# Patient Record
Sex: MALE | Race: WHITE | NOT HISPANIC OR LATINO | Employment: OTHER | URBAN - METROPOLITAN AREA
[De-identification: names, ages, dates, MRNs, and addresses within clinical notes are randomized per-mention and may not be internally consistent; named-entity substitution may affect disease eponyms.]

---

## 2018-02-28 ENCOUNTER — RESOLUTE PROFESSIONAL BILLING HOSPITAL PROF FEE (OUTPATIENT)
Dept: HOSPITALIST | Facility: MEDICAL CENTER | Age: 73
End: 2018-02-28
Payer: MEDICARE

## 2018-02-28 ENCOUNTER — APPOINTMENT (OUTPATIENT)
Dept: RADIOLOGY | Facility: MEDICAL CENTER | Age: 73
DRG: 391 | End: 2018-02-28
Attending: EMERGENCY MEDICINE
Payer: MEDICARE

## 2018-02-28 ENCOUNTER — HOSPITAL ENCOUNTER (INPATIENT)
Facility: MEDICAL CENTER | Age: 73
LOS: 2 days | DRG: 391 | End: 2018-03-02
Attending: EMERGENCY MEDICINE | Admitting: INTERNAL MEDICINE
Payer: MEDICARE

## 2018-02-28 DIAGNOSIS — K57.32 DIVERTICULITIS OF LARGE INTESTINE WITHOUT PERFORATION OR ABSCESS WITHOUT BLEEDING: ICD-10-CM

## 2018-02-28 DIAGNOSIS — K57.32 SIGMOID DIVERTICULITIS: ICD-10-CM

## 2018-02-28 PROBLEM — R59.1 LYMPHADENOPATHY: Status: ACTIVE | Noted: 2018-02-28

## 2018-02-28 PROBLEM — E78.5 HLD (HYPERLIPIDEMIA): Status: ACTIVE | Noted: 2018-02-28

## 2018-02-28 PROBLEM — F32.A DEPRESSION: Status: ACTIVE | Noted: 2018-02-28

## 2018-02-28 PROBLEM — K57.92 DIVERTICULITIS: Status: ACTIVE | Noted: 2018-02-28

## 2018-02-28 LAB
ALBUMIN SERPL BCP-MCNC: 4.3 G/DL (ref 3.2–4.9)
ALBUMIN/GLOB SERPL: 1.2 G/DL
ALP SERPL-CCNC: 69 U/L (ref 30–99)
ALT SERPL-CCNC: 30 U/L (ref 2–50)
ANION GAP SERPL CALC-SCNC: 8 MMOL/L (ref 0–11.9)
APPEARANCE UR: CLEAR
AST SERPL-CCNC: 29 U/L (ref 12–45)
BACTERIA #/AREA URNS HPF: ABNORMAL /HPF
BASOPHILS # BLD AUTO: 0.7 % (ref 0–1.8)
BASOPHILS # BLD: 0.05 K/UL (ref 0–0.12)
BILIRUB SERPL-MCNC: 0.9 MG/DL (ref 0.1–1.5)
BILIRUB UR QL CFM: NORMAL
BILIRUB UR QL STRIP.AUTO: ABNORMAL
BUN SERPL-MCNC: 18 MG/DL (ref 8–22)
CALCIUM SERPL-MCNC: 9.4 MG/DL (ref 8.4–10.2)
CHLORIDE SERPL-SCNC: 106 MMOL/L (ref 96–112)
CO2 SERPL-SCNC: 22 MMOL/L (ref 20–33)
COLOR UR: YELLOW
CREAT SERPL-MCNC: 1.27 MG/DL (ref 0.5–1.4)
EOSINOPHIL # BLD AUTO: 0.18 K/UL (ref 0–0.51)
EOSINOPHIL NFR BLD: 2.6 % (ref 0–6.9)
ERYTHROCYTE [DISTWIDTH] IN BLOOD BY AUTOMATED COUNT: 45.2 FL (ref 35.9–50)
GLOBULIN SER CALC-MCNC: 3.5 G/DL (ref 1.9–3.5)
GLUCOSE SERPL-MCNC: 96 MG/DL (ref 65–99)
GLUCOSE UR STRIP.AUTO-MCNC: NEGATIVE MG/DL
HCT VFR BLD AUTO: 45 % (ref 42–52)
HGB BLD-MCNC: 15 G/DL (ref 14–18)
IMM GRANULOCYTES # BLD AUTO: 0.02 K/UL (ref 0–0.11)
IMM GRANULOCYTES NFR BLD AUTO: 0.3 % (ref 0–0.9)
KETONES UR STRIP.AUTO-MCNC: NEGATIVE MG/DL
LEUKOCYTE ESTERASE UR QL STRIP.AUTO: NEGATIVE
LIPASE SERPL-CCNC: 11 U/L (ref 7–58)
LYMPHOCYTES # BLD AUTO: 1.07 K/UL (ref 1–4.8)
LYMPHOCYTES NFR BLD: 15.2 % (ref 22–41)
MCH RBC QN AUTO: 28.6 PG (ref 27–33)
MCHC RBC AUTO-ENTMCNC: 33.3 G/DL (ref 33.7–35.3)
MCV RBC AUTO: 85.9 FL (ref 81.4–97.8)
MICRO URNS: ABNORMAL
MONOCYTES # BLD AUTO: 0.91 K/UL (ref 0–0.85)
MONOCYTES NFR BLD AUTO: 12.9 % (ref 0–13.4)
NEUTROPHILS # BLD AUTO: 4.82 K/UL (ref 1.82–7.42)
NEUTROPHILS NFR BLD: 68.3 % (ref 44–72)
NITRITE UR QL STRIP.AUTO: POSITIVE
NRBC # BLD AUTO: 0 K/UL
NRBC BLD-RTO: 0 /100 WBC
PH UR STRIP.AUTO: 5 [PH]
PLATELET # BLD AUTO: 207 K/UL (ref 164–446)
PMV BLD AUTO: 8.9 FL (ref 9–12.9)
POTASSIUM SERPL-SCNC: 3.7 MMOL/L (ref 3.6–5.5)
PROT SERPL-MCNC: 7.8 G/DL (ref 6–8.2)
PROT UR QL STRIP: NEGATIVE MG/DL
RBC # BLD AUTO: 5.24 M/UL (ref 4.7–6.1)
RBC # URNS HPF: ABNORMAL /HPF
RBC UR QL AUTO: NEGATIVE
SODIUM SERPL-SCNC: 136 MMOL/L (ref 135–145)
SP GR UR REFRACTOMETRY: 1.03
WBC # BLD AUTO: 7.1 K/UL (ref 4.8–10.8)
WBC #/AREA URNS HPF: ABNORMAL /HPF

## 2018-02-28 PROCEDURE — 700102 HCHG RX REV CODE 250 W/ 637 OVERRIDE(OP): Performed by: INTERNAL MEDICINE

## 2018-02-28 PROCEDURE — 770006 HCHG ROOM/CARE - MED/SURG/GYN SEMI*

## 2018-02-28 PROCEDURE — 81001 URINALYSIS AUTO W/SCOPE: CPT

## 2018-02-28 PROCEDURE — 94760 N-INVAS EAR/PLS OXIMETRY 1: CPT

## 2018-02-28 PROCEDURE — 99221 1ST HOSP IP/OBS SF/LOW 40: CPT | Mod: AI | Performed by: INTERNAL MEDICINE

## 2018-02-28 PROCEDURE — 85025 COMPLETE CBC W/AUTO DIFF WBC: CPT

## 2018-02-28 PROCEDURE — 90670 PCV13 VACCINE IM: CPT | Performed by: INTERNAL MEDICINE

## 2018-02-28 PROCEDURE — 99285 EMERGENCY DEPT VISIT HI MDM: CPT

## 2018-02-28 PROCEDURE — 96365 THER/PROPH/DIAG IV INF INIT: CPT

## 2018-02-28 PROCEDURE — 700101 HCHG RX REV CODE 250: Performed by: INTERNAL MEDICINE

## 2018-02-28 PROCEDURE — 700105 HCHG RX REV CODE 258: Performed by: INTERNAL MEDICINE

## 2018-02-28 PROCEDURE — 3E0234Z INTRODUCTION OF SERUM, TOXOID AND VACCINE INTO MUSCLE, PERCUTANEOUS APPROACH: ICD-10-PCS | Performed by: INTERNAL MEDICINE

## 2018-02-28 PROCEDURE — 90471 IMMUNIZATION ADMIN: CPT

## 2018-02-28 PROCEDURE — 96375 TX/PRO/DX INJ NEW DRUG ADDON: CPT

## 2018-02-28 PROCEDURE — 96372 THER/PROPH/DIAG INJ SC/IM: CPT

## 2018-02-28 PROCEDURE — 83690 ASSAY OF LIPASE: CPT

## 2018-02-28 PROCEDURE — A9270 NON-COVERED ITEM OR SERVICE: HCPCS | Performed by: INTERNAL MEDICINE

## 2018-02-28 PROCEDURE — 700111 HCHG RX REV CODE 636 W/ 250 OVERRIDE (IP): Performed by: INTERNAL MEDICINE

## 2018-02-28 PROCEDURE — 80053 COMPREHEN METABOLIC PANEL: CPT

## 2018-02-28 PROCEDURE — 74176 CT ABD & PELVIS W/O CONTRAST: CPT

## 2018-02-28 PROCEDURE — 700111 HCHG RX REV CODE 636 W/ 250 OVERRIDE (IP): Performed by: EMERGENCY MEDICINE

## 2018-02-28 RX ORDER — FENOFIBRATE 134 MG/1
134 CAPSULE ORAL DAILY
Status: DISCONTINUED | OUTPATIENT
Start: 2018-03-01 | End: 2018-03-02 | Stop reason: HOSPADM

## 2018-02-28 RX ORDER — METOPROLOL SUCCINATE 25 MG/1
50 TABLET, EXTENDED RELEASE ORAL EVERY EVENING
Status: DISCONTINUED | OUTPATIENT
Start: 2018-02-28 | End: 2018-03-02 | Stop reason: HOSPADM

## 2018-02-28 RX ORDER — FENOFIBRATE 160 MG/1
160 TABLET ORAL DAILY
Status: DISCONTINUED | OUTPATIENT
Start: 2018-02-28 | End: 2018-02-28

## 2018-02-28 RX ORDER — PAROXETINE HYDROCHLORIDE 20 MG/1
20 TABLET, FILM COATED ORAL DAILY
Status: DISCONTINUED | OUTPATIENT
Start: 2018-03-01 | End: 2018-03-02 | Stop reason: HOSPADM

## 2018-02-28 RX ORDER — CIPROFLOXACIN 2 MG/ML
400 INJECTION, SOLUTION INTRAVENOUS ONCE
Status: COMPLETED | OUTPATIENT
Start: 2018-02-28 | End: 2018-02-28

## 2018-02-28 RX ORDER — ASPIRIN 325 MG
162.5 TABLET ORAL DAILY
Status: DISCONTINUED | OUTPATIENT
Start: 2018-02-28 | End: 2018-03-02 | Stop reason: HOSPADM

## 2018-02-28 RX ORDER — SODIUM CHLORIDE 9 MG/ML
INJECTION, SOLUTION INTRAVENOUS CONTINUOUS
Status: DISCONTINUED | OUTPATIENT
Start: 2018-02-28 | End: 2018-03-02 | Stop reason: HOSPADM

## 2018-02-28 RX ORDER — MORPHINE SULFATE 4 MG/ML
2 INJECTION, SOLUTION INTRAMUSCULAR; INTRAVENOUS
Status: DISCONTINUED | OUTPATIENT
Start: 2018-02-28 | End: 2018-03-02 | Stop reason: HOSPADM

## 2018-02-28 RX ORDER — OXYCODONE HYDROCHLORIDE 5 MG/1
5 TABLET ORAL
Status: DISCONTINUED | OUTPATIENT
Start: 2018-02-28 | End: 2018-03-02 | Stop reason: HOSPADM

## 2018-02-28 RX ORDER — KETOROLAC TROMETHAMINE 30 MG/ML
30 INJECTION, SOLUTION INTRAMUSCULAR; INTRAVENOUS ONCE
Status: COMPLETED | OUTPATIENT
Start: 2018-02-28 | End: 2018-02-28

## 2018-02-28 RX ORDER — ASPIRIN 325 MG
162.5 TABLET ORAL EVERY EVENING
COMMUNITY
End: 2020-01-15

## 2018-02-28 RX ORDER — SIMVASTATIN 10 MG
10 TABLET ORAL DAILY
Status: DISCONTINUED | OUTPATIENT
Start: 2018-03-01 | End: 2018-03-02 | Stop reason: HOSPADM

## 2018-02-28 RX ORDER — KETOROLAC TROMETHAMINE 30 MG/ML
15 INJECTION, SOLUTION INTRAMUSCULAR; INTRAVENOUS ONCE
Status: DISCONTINUED | OUTPATIENT
Start: 2018-02-28 | End: 2018-02-28

## 2018-02-28 RX ORDER — OXYCODONE HYDROCHLORIDE 5 MG/1
2.5 TABLET ORAL
Status: DISCONTINUED | OUTPATIENT
Start: 2018-02-28 | End: 2018-03-02 | Stop reason: HOSPADM

## 2018-02-28 RX ORDER — METOPROLOL SUCCINATE 25 MG/1
50 TABLET, EXTENDED RELEASE ORAL EVERY EVENING
Status: DISCONTINUED | OUTPATIENT
Start: 2018-03-01 | End: 2018-02-28

## 2018-02-28 RX ADMIN — KETOROLAC TROMETHAMINE 30 MG: 30 INJECTION, SOLUTION INTRAMUSCULAR at 16:46

## 2018-02-28 RX ADMIN — SODIUM CHLORIDE: 9 INJECTION, SOLUTION INTRAVENOUS at 22:13

## 2018-02-28 RX ADMIN — CEFTRIAXONE 2 G: 2 INJECTION, POWDER, FOR SOLUTION INTRAMUSCULAR; INTRAVENOUS at 18:56

## 2018-02-28 RX ADMIN — PNEUMOCOCCAL 13-VALENT CONJUGATE VACCINE 0.5 ML: 2.2; 2.2; 2.2; 2.2; 2.2; 4.4; 2.2; 2.2; 2.2; 2.2; 2.2; 2.2; 2.2 INJECTION, SUSPENSION INTRAMUSCULAR at 20:42

## 2018-02-28 RX ADMIN — OXYCODONE HYDROCHLORIDE 5 MG: 5 TABLET ORAL at 18:57

## 2018-02-28 RX ADMIN — METRONIDAZOLE 500 MG: 500 INJECTION, SOLUTION INTRAVENOUS at 19:00

## 2018-02-28 RX ADMIN — ENOXAPARIN SODIUM 40 MG: 100 INJECTION SUBCUTANEOUS at 18:57

## 2018-02-28 RX ADMIN — FENTANYL CITRATE 50 MCG: 50 INJECTION INTRAMUSCULAR; INTRAVENOUS at 17:27

## 2018-02-28 RX ADMIN — ASPIRIN 325 MG ORAL TABLET 162.5 MG: 325 PILL ORAL at 18:59

## 2018-02-28 RX ADMIN — CIPROFLOXACIN 400 MG: 2 INJECTION, SOLUTION INTRAVENOUS at 17:31

## 2018-02-28 ASSESSMENT — LIFESTYLE VARIABLES
CONSUMPTION TOTAL: POSITIVE
HAVE PEOPLE ANNOYED YOU BY CRITICIZING YOUR DRINKING: NO
TOTAL SCORE: 0
HOW MANY TIMES IN THE PAST YEAR HAVE YOU HAD 5 OR MORE DRINKS IN A DAY: 3
AVERAGE NUMBER OF DAYS PER WEEK YOU HAVE A DRINK CONTAINING ALCOHOL: 0
EVER FELT BAD OR GUILTY ABOUT YOUR DRINKING: NO
HAVE YOU EVER FELT YOU SHOULD CUT DOWN ON YOUR DRINKING: NO
TOTAL SCORE: 0
TOTAL SCORE: 0
EVER HAD A DRINK FIRST THING IN THE MORNING TO STEADY YOUR NERVES TO GET RID OF A HANGOVER: NO
ALCOHOL_USE: YES
ON A TYPICAL DAY WHEN YOU DRINK ALCOHOL HOW MANY DRINKS DO YOU HAVE: 3
EVER_SMOKED: YES

## 2018-02-28 ASSESSMENT — ENCOUNTER SYMPTOMS
NAUSEA: 0
BACK PAIN: 1
FOCAL WEAKNESS: 0
DEPRESSION: 0
BLOOD IN STOOL: 0
SHORTNESS OF BREATH: 0
VOMITING: 0
DIZZINESS: 0
FEVER: 0
FLANK PAIN: 1
PALPITATIONS: 0
HEARTBURN: 0
DIARRHEA: 0
HALLUCINATIONS: 0
HEADACHES: 0
SORE THROAT: 0
WEAKNESS: 0
ABDOMINAL PAIN: 0
CHILLS: 0
COUGH: 0
MYALGIAS: 0

## 2018-02-28 ASSESSMENT — PATIENT HEALTH QUESTIONNAIRE - PHQ9
SUM OF ALL RESPONSES TO PHQ QUESTIONS 1-9: 0
1. LITTLE INTEREST OR PLEASURE IN DOING THINGS: NOT AT ALL
SUM OF ALL RESPONSES TO PHQ9 QUESTIONS 1 AND 2: 0
2. FEELING DOWN, DEPRESSED, IRRITABLE, OR HOPELESS: NOT AT ALL

## 2018-02-28 ASSESSMENT — PAIN SCALES - GENERAL
PAINLEVEL_OUTOF10: 10
PAINLEVEL_OUTOF10: 8

## 2018-02-28 NOTE — ED NOTES
Assumed care of pt upon being roomed. Vs as charted, states left sided low back pain 8/10. In no apparent distress, vs as charted, call light in reach. Labs noted from triage-await erp

## 2018-03-01 PROBLEM — K76.0 FATTY LIVER: Status: ACTIVE | Noted: 2018-03-01

## 2018-03-01 PROBLEM — J18.9 PNEUMONIA: Status: ACTIVE | Noted: 2018-03-01

## 2018-03-01 LAB
ALBUMIN SERPL BCP-MCNC: 3.3 G/DL (ref 3.2–4.9)
ALBUMIN/GLOB SERPL: 1.2 G/DL
ALP SERPL-CCNC: 55 U/L (ref 30–99)
ALT SERPL-CCNC: 26 U/L (ref 2–50)
ANION GAP SERPL CALC-SCNC: 7 MMOL/L (ref 0–11.9)
AST SERPL-CCNC: 24 U/L (ref 12–45)
BASOPHILS # BLD AUTO: 0.8 % (ref 0–1.8)
BASOPHILS # BLD: 0.05 K/UL (ref 0–0.12)
BILIRUB SERPL-MCNC: 0.9 MG/DL (ref 0.1–1.5)
BUN SERPL-MCNC: 19 MG/DL (ref 8–22)
CALCIUM SERPL-MCNC: 8.6 MG/DL (ref 8.4–10.2)
CHLORIDE SERPL-SCNC: 108 MMOL/L (ref 96–112)
CHOLEST SERPL-MCNC: 153 MG/DL (ref 100–199)
CO2 SERPL-SCNC: 24 MMOL/L (ref 20–33)
CREAT SERPL-MCNC: 1.45 MG/DL (ref 0.5–1.4)
EOSINOPHIL # BLD AUTO: 0.24 K/UL (ref 0–0.51)
EOSINOPHIL NFR BLD: 4 % (ref 0–6.9)
ERYTHROCYTE [DISTWIDTH] IN BLOOD BY AUTOMATED COUNT: 46.1 FL (ref 35.9–50)
GLOBULIN SER CALC-MCNC: 2.7 G/DL (ref 1.9–3.5)
GLUCOSE SERPL-MCNC: 102 MG/DL (ref 65–99)
HAV IGM SERPL QL IA: NEGATIVE
HBV CORE IGM SER QL: NEGATIVE
HBV SURFACE AG SER QL: NEGATIVE
HCT VFR BLD AUTO: 39.2 % (ref 42–52)
HCV AB SER QL: NEGATIVE
HDLC SERPL-MCNC: 29 MG/DL
HGB BLD-MCNC: 12.8 G/DL (ref 14–18)
IMM GRANULOCYTES # BLD AUTO: 0.04 K/UL (ref 0–0.11)
IMM GRANULOCYTES NFR BLD AUTO: 0.7 % (ref 0–0.9)
LDLC SERPL CALC-MCNC: 82 MG/DL
LYMPHOCYTES # BLD AUTO: 1 K/UL (ref 1–4.8)
LYMPHOCYTES NFR BLD: 16.8 % (ref 22–41)
MCH RBC QN AUTO: 28.3 PG (ref 27–33)
MCHC RBC AUTO-ENTMCNC: 32.7 G/DL (ref 33.7–35.3)
MCV RBC AUTO: 86.5 FL (ref 81.4–97.8)
MONOCYTES # BLD AUTO: 0.91 K/UL (ref 0–0.85)
MONOCYTES NFR BLD AUTO: 15.3 % (ref 0–13.4)
NEUTROPHILS # BLD AUTO: 3.71 K/UL (ref 1.82–7.42)
NEUTROPHILS NFR BLD: 62.4 % (ref 44–72)
NRBC # BLD AUTO: 0 K/UL
NRBC BLD-RTO: 0 /100 WBC
PLATELET # BLD AUTO: 162 K/UL (ref 164–446)
PMV BLD AUTO: 9 FL (ref 9–12.9)
POTASSIUM SERPL-SCNC: 3.9 MMOL/L (ref 3.6–5.5)
PROT SERPL-MCNC: 6 G/DL (ref 6–8.2)
RBC # BLD AUTO: 4.53 M/UL (ref 4.7–6.1)
SODIUM SERPL-SCNC: 139 MMOL/L (ref 135–145)
TRIGL SERPL-MCNC: 209 MG/DL (ref 0–149)
WBC # BLD AUTO: 6 K/UL (ref 4.8–10.8)

## 2018-03-01 PROCEDURE — 700102 HCHG RX REV CODE 250 W/ 637 OVERRIDE(OP): Performed by: STUDENT IN AN ORGANIZED HEALTH CARE EDUCATION/TRAINING PROGRAM

## 2018-03-01 PROCEDURE — 80061 LIPID PANEL: CPT

## 2018-03-01 PROCEDURE — A9270 NON-COVERED ITEM OR SERVICE: HCPCS | Performed by: STUDENT IN AN ORGANIZED HEALTH CARE EDUCATION/TRAINING PROGRAM

## 2018-03-01 PROCEDURE — A9270 NON-COVERED ITEM OR SERVICE: HCPCS | Performed by: INTERNAL MEDICINE

## 2018-03-01 PROCEDURE — 94640 AIRWAY INHALATION TREATMENT: CPT

## 2018-03-01 PROCEDURE — 94760 N-INVAS EAR/PLS OXIMETRY 1: CPT

## 2018-03-01 PROCEDURE — 80053 COMPREHEN METABOLIC PANEL: CPT

## 2018-03-01 PROCEDURE — 770006 HCHG ROOM/CARE - MED/SURG/GYN SEMI*

## 2018-03-01 PROCEDURE — 99232 SBSQ HOSP IP/OBS MODERATE 35: CPT | Performed by: HOSPITALIST

## 2018-03-01 PROCEDURE — 700111 HCHG RX REV CODE 636 W/ 250 OVERRIDE (IP): Performed by: INTERNAL MEDICINE

## 2018-03-01 PROCEDURE — 700101 HCHG RX REV CODE 250: Performed by: INTERNAL MEDICINE

## 2018-03-01 PROCEDURE — 36415 COLL VENOUS BLD VENIPUNCTURE: CPT

## 2018-03-01 PROCEDURE — 700105 HCHG RX REV CODE 258: Performed by: INTERNAL MEDICINE

## 2018-03-01 PROCEDURE — 85025 COMPLETE CBC W/AUTO DIFF WBC: CPT

## 2018-03-01 PROCEDURE — 700101 HCHG RX REV CODE 250: Performed by: HOSPITALIST

## 2018-03-01 PROCEDURE — 700102 HCHG RX REV CODE 250 W/ 637 OVERRIDE(OP): Performed by: INTERNAL MEDICINE

## 2018-03-01 PROCEDURE — 80074 ACUTE HEPATITIS PANEL: CPT

## 2018-03-01 RX ORDER — IPRATROPIUM BROMIDE AND ALBUTEROL SULFATE 2.5; .5 MG/3ML; MG/3ML
3 SOLUTION RESPIRATORY (INHALATION)
Status: DISCONTINUED | OUTPATIENT
Start: 2018-03-01 | End: 2018-03-02 | Stop reason: HOSPADM

## 2018-03-01 RX ORDER — ZOLPIDEM TARTRATE 5 MG/1
5 TABLET ORAL NIGHTLY PRN
Status: DISCONTINUED | OUTPATIENT
Start: 2018-03-01 | End: 2018-03-02 | Stop reason: HOSPADM

## 2018-03-01 RX ORDER — IPRATROPIUM BROMIDE AND ALBUTEROL SULFATE 2.5; .5 MG/3ML; MG/3ML
3 SOLUTION RESPIRATORY (INHALATION)
Status: DISCONTINUED | OUTPATIENT
Start: 2018-03-01 | End: 2018-03-01 | Stop reason: ALTCHOICE

## 2018-03-01 RX ORDER — METRONIDAZOLE 500 MG/1
500 TABLET ORAL EVERY 8 HOURS
Status: DISCONTINUED | OUTPATIENT
Start: 2018-03-01 | End: 2018-03-02 | Stop reason: HOSPADM

## 2018-03-01 RX ADMIN — CEFTRIAXONE 2 G: 2 INJECTION, POWDER, FOR SOLUTION INTRAMUSCULAR; INTRAVENOUS at 08:19

## 2018-03-01 RX ADMIN — PAROXETINE HYDROCHLORIDE 20 MG: 20 TABLET, FILM COATED ORAL at 08:19

## 2018-03-01 RX ADMIN — IPRATROPIUM BROMIDE AND ALBUTEROL SULFATE 3 ML: .5; 3 SOLUTION RESPIRATORY (INHALATION) at 21:24

## 2018-03-01 RX ADMIN — ZOLPIDEM TARTRATE 5 MG: 5 TABLET ORAL at 00:25

## 2018-03-01 RX ADMIN — METRONIDAZOLE 500 MG: 500 INJECTION, SOLUTION INTRAVENOUS at 05:35

## 2018-03-01 RX ADMIN — METOPROLOL SUCCINATE 50 MG: 25 TABLET, EXTENDED RELEASE ORAL at 20:31

## 2018-03-01 RX ADMIN — SIMVASTATIN 10 MG: 10 TABLET, FILM COATED ORAL at 08:19

## 2018-03-01 RX ADMIN — FENOFIBRATE 134 MG: 134 CAPSULE ORAL at 08:19

## 2018-03-01 RX ADMIN — METRONIDAZOLE 500 MG: 500 TABLET ORAL at 14:37

## 2018-03-01 RX ADMIN — OXYCODONE HYDROCHLORIDE 5 MG: 5 TABLET ORAL at 11:47

## 2018-03-01 RX ADMIN — OXYCODONE HYDROCHLORIDE 5 MG: 5 TABLET ORAL at 20:31

## 2018-03-01 RX ADMIN — OXYCODONE HYDROCHLORIDE 5 MG: 5 TABLET ORAL at 00:22

## 2018-03-01 RX ADMIN — METRONIDAZOLE 500 MG: 500 TABLET ORAL at 22:20

## 2018-03-01 RX ADMIN — ENOXAPARIN SODIUM 40 MG: 100 INJECTION SUBCUTANEOUS at 08:19

## 2018-03-01 RX ADMIN — OXYCODONE HYDROCHLORIDE 5 MG: 5 TABLET ORAL at 05:34

## 2018-03-01 RX ADMIN — IPRATROPIUM BROMIDE AND ALBUTEROL SULFATE 3 ML: .5; 3 SOLUTION RESPIRATORY (INHALATION) at 17:32

## 2018-03-01 RX ADMIN — SODIUM CHLORIDE: 9 INJECTION, SOLUTION INTRAVENOUS at 05:38

## 2018-03-01 RX ADMIN — ASPIRIN 325 MG ORAL TABLET 162.5 MG: 325 PILL ORAL at 08:19

## 2018-03-01 ASSESSMENT — PAIN SCALES - GENERAL
PAINLEVEL_OUTOF10: 7
PAINLEVEL_OUTOF10: 2
PAINLEVEL_OUTOF10: 6
PAINLEVEL_OUTOF10: 9
PAINLEVEL_OUTOF10: 8
PAINLEVEL_OUTOF10: 3
PAINLEVEL_OUTOF10: 4

## 2018-03-01 ASSESSMENT — ENCOUNTER SYMPTOMS
PALPITATIONS: 0
WHEEZING: 1
CHILLS: 0
COUGH: 1
EYE DISCHARGE: 0
TREMORS: 0
EYE REDNESS: 0
VOMITING: 0
STRIDOR: 0
MYALGIAS: 1
SENSORY CHANGE: 0
DIARRHEA: 0
FOCAL WEAKNESS: 0
FLANK PAIN: 1
BACK PAIN: 1
FEVER: 0
DIAPHORESIS: 0
HALLUCINATIONS: 0
SHORTNESS OF BREATH: 0
WEAKNESS: 0
SPEECH CHANGE: 0
ABDOMINAL PAIN: 0

## 2018-03-01 ASSESSMENT — LIFESTYLE VARIABLES
SUBSTANCE_ABUSE: 1
EVER_SMOKED: YES

## 2018-03-01 NOTE — PROGRESS NOTES
Received report from night RN.  Assumed pt care.  Pt denies pain at this time.  Updated pt and family with POC.  Will continue with care.

## 2018-03-01 NOTE — ASSESSMENT & PLAN NOTE
Severe sigmoid - improved symptoms.   cw IV rocephin, flagyl   IVFs   Pain control prn oxycodone, IV ms  Last colo in [facility] , [place] 9 years ago-- will need referral for outpt colo following acute illness, in 4- 6 weeks.

## 2018-03-01 NOTE — PROGRESS NOTES
Assumed care of patient from ED. Patient A&Ox4, awake and alert resting in bed. Reports 10/10 pain in left flank area.

## 2018-03-01 NOTE — ED NOTES
PIV placed in RAC.  Pt aware will be admitted.  Addressed questions r/t Diverticulitis as discussed by ERP

## 2018-03-01 NOTE — ASSESSMENT & PLAN NOTE
Abdominal lymphadenopathy seen on CT scan. Differential includes underlying malignancy .  Will need colo referral following discharge

## 2018-03-01 NOTE — DISCHARGE PLANNING
Care Transition Team Assessment    SW intern met with patient at bedside. Patient plans to discharge home with spouse when medically able. Patient has no questions or concerns at this time.     Information Source  Orientation : Oriented x 4  Information Given By: Patient  Informant's Name: Papo   Who is responsible for making decisions for patient? : Patient    Readmission Evaluation  Is this a readmission?: No    Elopement Risk  Legal Hold: No  Ambulatory or Self Mobile in Wheelchair: Yes  Disoriented: No  Psychiatric Symptoms: None  History of Wandering: No  Elopement this Admit: No  Vocalizing Wanting to Leave: No  Displays Behaviors, Body Language Wanting to Leave: No-Not at Risk for Elopement  Elopement Risk: Not at Risk for Elopement    Interdisciplinary Discharge Planning  Primary Care Physician: DR. VAIL  Lives with - Patient's Self Care Capacity: Spouse  Patient or legal guardian wants to designate a caregiver (see row info): No  Support Systems: Spouse / Significant Other, Children  Housing / Facility: [address] [place] [address] House  Do You Take your Prescribed Medications Regularly: Yes  Able to Return to Previous ADL's: Yes  Mobility Issues: No  Prior Services: None  Assistance Needed: No    Discharge Preparedness  What is your plan after discharge?: Home with help  What are your discharge supports?: Spouse  Prior Functional Level: Independent with Activities of Daily Living  Difficulity with ADLs: None  Difficulity with IADLs: None    Functional Assesment  Prior Functional Level: Independent with Activities of Daily Living    Finances  Financial Barriers to Discharge: No  Prescription Coverage: Yes    Vision / Hearing Impairment  Vision Impairment : Yes (wears glasses)  Hearing Impairment : No    Values / Beliefs / Concerns  Values / Beliefs Concerns : No    Advance Directive  Advance Directive?: None  Advance Directive offered?: AD Booklet refused    Domestic Abuse  Have you ever been the victim of abuse or  violence?: No  Physical Abuse or Sexual Abuse: No  Verbal Abuse or Emotional Abuse: No  Possible Abuse Reported to:: Not Applicable    Psychological Assessment  History of Substance Abuse: None  History of Psychiatric Problems: No  Non-compliant with Treatment: No  Newly Diagnosed Illness: No    Discharge Risks or Barriers  Discharge risks or barriers?: No    Anticipated Discharge Information  Anticipated discharge disposition: Home  Discharge Address: [address] [place] [address]  Discharge Contact Phone Number: [phone]    This note has been reviewed by Anna KAPADIA

## 2018-03-01 NOTE — ASSESSMENT & PLAN NOTE
CT findings of Left basilar infiltrate w recent cough, congestion.   IV atbs as above  Treat wheezes with bronchodilator therapy .

## 2018-03-01 NOTE — H&P
Hospital Medicine History and Physical    Date of Service  2/28/2018    Chief Complaint  Chief Complaint   Patient presents with   • Abdominal Pain     since sunday       History of Presenting Illness  72 y.o. male with a history of depression, PACs on metoprolol, who presented 2/28/2018 with left flank pain.     His symptoms started on Sunday morning, 4 days ago with left flank pain. He describes the pain as aching and did not radiate anywhere. He had no associated fever or change in appetite. He continued to have normal bowel movements, denies any bloody diarrhea. He has not had any changes in urination, no blood in his urine or burning. He thought this is possibly due to straining a muscle in his back, which she has done before. The pain persisted therefore on Monday morning he went to be evaluated at [facility]. He is evaluated with physical exam and sent home after receiving 1 dose of Dilaudid. Labs and imaging were not ordered.     He continued to have left flank pain for the next several days which was worsening and constant. He had no associated vomiting or diarrhea, no blood in the stool and no fever. The pain was severe enough that he needed to come back to the ER for reevaluation today.     He has had a colonoscopy in the past, done in [facility] about 8 or 9 years ago. He denies any abnormality seen on that exam.   Primary Care Physician  Smooth Tsang M.D.    Consultants  None    Code Status  Full    Review of Systems  Review of Systems   Constitutional: Negative for chills, fever and malaise/fatigue.   HENT: Negative for sore throat.    Respiratory: Negative for cough and shortness of breath.    Cardiovascular: Negative for chest pain and palpitations.   Gastrointestinal: Negative for abdominal pain, blood in stool, diarrhea, heartburn, nausea and vomiting.   Genitourinary: Positive for flank pain (Left). Negative for dysuria and frequency.   Musculoskeletal:  Positive for back pain. Negative for myalgias.   Neurological: Negative for dizziness, focal weakness, weakness and headaches.   Psychiatric/Behavioral: Negative for depression and hallucinations.   All other systems reviewed and are negative.       Past Medical History  Past Medical History:   Diagnosis Date   • Renal disorder     had short term kidney failure, with 3 dialysis treatments   • Arrhythmia 2011    on metoprolol   • Heart burn    • Pneumonia     20+ years ago       Surgical History  Past Surgical History:   Procedure Laterality Date   • GASTROSCOPY-ENDO  10/13/2013    Performed by Oseas Madden M.D. at ENDOSCOPY [facility] ORS   • INGUINAL HERNIA REPAIR  3/26/2013    Performed by Felipe Carter M.D. at SURGERY SAME DAY [facility] ORS   • OTHER  2011    dialysis cath insertion and removal       Medications  No current facility-administered medications on file prior to encounter.      Current Outpatient Prescriptions on File Prior to Encounter   Medication Sig Dispense Refill   • fenofibrate (TRIGLIDE) 160 MG tablet Take 160 mg by mouth every day.     • paroxetine (PAXIL) 20 MG Tab Take 20 mg by mouth every day.     • metoprolol SR (TOPROL XL) 50 MG TB24 Take 50 mg by mouth every evening.     • simvastatin (ZOCOR) 10 MG TABS Take 10 mg by mouth every day.         Family History  No family history on file.    Social History  Social History   Substance Use Topics   • Smoking status: Former Smoker   • Smokeless tobacco: Never Used   • Alcohol use Yes      Comment: Occasionally       Allergies  Allergies   Allergen Reactions   • Amoxicillin Diarrhea     Diarrhea          Physical Exam  Laboratory   Hemodynamics  Temp (24hrs), Av.7 °C (98 °F), Min:36.3 °C (97.3 °F), Max:37 °C (98.6 °F)   Temperature: 36.3 °C (97.3 °F)  Pulse  Av.9  Min: 69  Max: 87 Heart Rate (Monitored): 67  Blood Pressure : 107/58, NIBP: 104/61      Respiratory      Respiration: 16, Pulse Oximetry: 93 %              Physical Exam   Constitutional: He is oriented to person, place, and time. He appears well-developed and well-nourished. No distress.   HENT:   Head: Normocephalic.   Mouth/Throat: No oropharyngeal exudate.   Eyes: Conjunctivae are normal. Pupils are equal, round, and reactive to light.   Neck: Normal range of motion. No tracheal deviation present.   Cardiovascular: Normal rate and regular rhythm.    No murmur heard.  Pulmonary/Chest: Effort normal. No respiratory distress. He has no wheezes. He exhibits no tenderness.   Abdominal: Soft. He exhibits no distension. There is tenderness (Very mild lower abdominal tenderness in RLQ and LLQ, no guarding). There is no guarding.   No CVA tenderness.   Musculoskeletal: Normal range of motion. He exhibits no edema or tenderness.   Lymphadenopathy:     He has no cervical adenopathy.   Neurological: He is alert and oriented to person, place, and time. No cranial nerve deficit.   Skin: Skin is warm and dry. No rash noted.   Psychiatric: He has a normal mood and affect. His behavior is normal.   Nursing note and vitals reviewed.      Recent Labs      02/28/18   1320   WBC  7.1   RBC  5.24   HEMOGLOBIN  15.0   HEMATOCRIT  45.0   MCV  85.9   MCH  28.6   MCHC  33.3*   RDW  45.2   PLATELETCT  207   MPV  8.9*     Recent Labs      02/28/18   1320   SODIUM  136   POTASSIUM  3.7   CHLORIDE  106   CO2  22   GLUCOSE  96   BUN  18   CREATININE  1.27   CALCIUM  9.4     Recent Labs      02/28/18   1320   ALTSGPT  30   ASTSGOT  29   ALKPHOSPHAT  69   TBILIRUBIN  0.9   LIPASE  11   GLUCOSE  96                 Lab Results   Component Value Date    TROPONINI 0.01 10/13/2013     Urinalysis:    Lab Results  Component Value Date/Time   SPECGRAVITY 1.026 02/28/2018 1325   GLUCOSEUR Negative 02/28/2018 1325   KETONES Negative 02/28/2018 1325   NITRITE Positive (A) 02/28/2018 1325   WBCURINE 0-2 (A) 02/28/2018 1325   RBCURINE 0-2 (A) 02/28/2018 1325   BACTERIA Few (A) 02/28/2018 1325         Imaging  Ct-renal Colic Evaluation(a/p W/o)    Result Date: 2/28/2018 2/28/2018 4:26 PM HISTORY/REASON FOR EXAM:  ABDOMINAL PAIN. Pain radiating to the back TECHNIQUE/EXAM DESCRIPTION: CT abdomen/pelvis without contrast. 5 mm helical images of the abdomen and pelvis were obtained from the diaphragmatic domes through the pubic symphysis. Low dose optimization technique was utilized for this CT exam including automated exposure control and adjustment of the mA and/or kV according to patient size. COMPARISON: None. FINDINGS: Lung bases show small left lower lobe consolidation adjacent to a small fatty Bochdalek hernia. Bilateral dependent bronchial wall thickening Mildly increased number of posterior mediastinal lymph nodes measuring up to 11 x 9 mm. These are indeterminate. Scattered smaller mesenteric nodes are seen. Lack of IV contrast decreases sensitivity of the study in evaluation of solid organs and bowel but is optimized for detection of urolithiasis. No urolithiasis is seen. There is no hydroureteronephrosis. Bladder is unremarkable. There is severe sigmoid colonic wall thickening with moderate to severe adjacent fat stranding, trace adjacent fluid. No free air. Diverticula are seen. Small adjacent lymph nodes. Normal caliber appendix. No abscess is detected without the benefit of contrast. Severe hepatic steatosis Left hepatic cysts measure in aggregate 9 cm Mild splenomegaly Moderate atherosclerosis without aneurysm Large fatty left direct inguinal hernia Noncontrasted appearance of the pancreas, and adrenal glands are normal. No free air. No bony destructive process.     Severe acute sigmoid diverticulitis with trace adjacent fluid. There are also some small adjacent lymph nodes. These are most likely reactive although malignant origin is not excluded. After the acute clinical situation has resolved, colorectal carcinoma  screening is recommended as superimposed mass is a possibility Severe hepatic  steatosis and multiple cysts Mild splenomegaly Mild left lower lobe consolidation and there is bilateral bronchial wall thickening dependently suspicious for aspiration Mild posterior mediastinal lymphadenopathy is indeterminate. Recommend clinical correlation for dysphagia/esophageal symptoms.     Assessment/Plan     I anticipate this patient will require at least two midnights for appropriate medical management, necessitating inpatient admission.    * Diverticulitis   Assessment & Plan    Has had left flank pain for 4 days, no associated fever. No change in bowel habits. Has had a colonoscopy although it has been 8 or 9 years, this was done in [facility].  Due to presence of left flank pain, evaluated appearance of kidneys and they appeared normal on CT scan. Creatinine is also negative. No evidence of stone. His urine does suggest he is volume depleted.  -Admitted for IV antibiotics, ceftriaxone Flagyl  -IV hydration with normal saline  -Repeat labs in a.m.  -Pain control with oxycodone and IV morphine as needed  -Positive nitrites and urine however this is not necessarily suggestive of infection, follow-up urine culture to ensure nothing grows.        Depression   Assessment & Plan    Continue Paxil        HLD (hyperlipidemia)   Assessment & Plan    Continue simvastatin        Lymphadenopathy   Assessment & Plan    Abdominal lymphadenopathy seen on CT scan. He has had a colonoscopy however this was 8 or 9 years ago, this was done in [facility]. The patient does not have his records. Does not have a diagnosis of diverticulosis previously.  -Recommend outpatient screening colonoscopy to rule out possible malignancy as an etiology of his lymphadenopathy            VTE prophylaxis: Lovenox.

## 2018-03-01 NOTE — ED PROVIDER NOTES
ED Provider Note    CHIEF COMPLAINT  Chief Complaint   Patient presents with   • Abdominal Pain     since sunday         HPI  Papo Souza is a 72 y.o. male who presents to the ED with left lower back pain. The patient states she's been having left lower back pain for the last several days, 4 days. At the sharp severe pain worse with touch worse with movement. If he holds still he still has the pain mildly. Sometimes it does radiate down the leg leg, no numbness, tingling, weakness. Patient denies any abdominal pains, any nausea vomiting or fevers. Patient denies any hematuria or dysuria. Patient states he was at [facility] 2-3 days ago, and got a prescription for some muscle relaxers. Patient has not taken any Tylenol or ibuprofen for the pain.    REVIEW OF SYSTEMS  See HPI for further details. All other systems are negative.     PAST MEDICAL HISTORY  Past Medical History:   Diagnosis Date   • Arrhythmia 2011    on metoprolol   • Heart burn    • Pneumonia     20+ years ago   • Renal disorder 2011    had short term kidney failure, with 3 dialysis treatments       FAMILY HISTORY  No family history on file.    SOCIAL HISTORY  Social History     Social History   • Marital status:      Spouse name: N/A   • Number of children: N/A   • Years of education: N/A     Social History Main Topics   • Smoking status: Former Smoker   • Smokeless tobacco: Never Used   • Alcohol use Yes      Comment: Occasionally   • Drug use: No   • Sexual activity: Not on file     Other Topics Concern   • Not on file     Social History Narrative   • No narrative on file       SURGICAL HISTORY  Past Surgical History:   Procedure Laterality Date   • GASTROSCOPY-ENDO  10/13/2013    Performed by Oseas Madden M.D. at ENDOSCOPY [facility] ORS   • INGUINAL HERNIA REPAIR  3/26/2013    Performed by Felipe Carter M.D. at SURGERY SAME DAY [facility] ORS   • OTHER  2011    dialysis cath insertion and removal       CURRENT  "MEDICATIONS  Home Medications     Reviewed by Giovana Dowell (Pharmacy Tech) on 02/28/18 at 1539  Med List Status: Complete   Medication Last Dose Status   aspirin (ASA) 325 MG Tab 2/27/2018 Active   fenofibrate (TRIGLIDE) 160 MG tablet 2/28/2018 Active   metoprolol SR (TOPROL XL) 50 MG TB24 2/27/2018 Active   paroxetine (PAXIL) 20 MG Tab 2/28/2018 Active   simvastatin (ZOCOR) 10 MG TABS 2/28/2018 Active                ALLERGIES  Allergies   Allergen Reactions   • Amoxicillin Diarrhea     Diarrhea         PHYSICAL EXAM  VITAL SIGNS: /74   Pulse 76   Temp 37 °C (98.6 °F)   Resp 17   Ht 1.778 m (5' 10\")   Wt 87.5 kg (192 lb 14.4 oz)   SpO2 94%   BMI 27.68 kg/m²   Constitutional: Well developed, Well nourished, mild distress, Non-toxic appearance.   HENT: Normocephalic, Atraumatic, Bilateral external ears normal, Oropharynx clear, No oral exudates, Nose normal.   Eyes: PERRLA, EOMI, Conjunctiva normal, No discharge.   Neck: Normal range of motion, No tenderness, Supple, No stridor.   Lymphatic: No lymphadenopathy noted.   Cardiovascular: Normal heart rate, Normal rhythm.   Thorax & Lungs: Normal breath sounds, No respiratory distress, No wheezing, No chest tenderness.   Abdomen: Mild tender to palpation throughout the left side of abdomen, no rebound no peritoneal signs  Skin: Warm, Dry, No erythema, No rash.   Back: Left paraspinal and CVA tenderness, no midline CT or L-spine tenderness   Extremities: Intact distal pulses, No edema, No tenderness.   Neurologic: Alert & oriented x 3, moves all extremities spontaneously.     RADIOLOGY/PROCEDURES  CT-RENAL COLIC EVALUATION(A/P W/O)   Final Result      Severe acute sigmoid diverticulitis with trace adjacent fluid. There are also some small adjacent lymph nodes. These are most likely reactive although malignant origin is not excluded. After the acute clinical situation has resolved, colorectal carcinoma    screening is recommended as superimposed mass " is a possibility      Severe hepatic steatosis and multiple cysts      Mild splenomegaly      Mild left lower lobe consolidation and there is bilateral bronchial wall thickening dependently suspicious for aspiration      Mild posterior mediastinal lymphadenopathy is indeterminate. Recommend clinical correlation for dysphagia/esophageal symptoms.            COURSE & MEDICAL DECISION MAKING  Pertinent Labs & Imaging studies reviewed. (See chart for details)  Patient is coming in with what appears to be musculoskeletal low back pain. Laboratory tests were unrevealing however the patient does have a urinary tract infection with positive nitrites. I will get a CT stone study to make sure the patient does not have a kidney stone. We'll treat the patient with Omnicef.    CT stone survey shows severe diverticulitis, we will establish an IV, give the patient Cipro, Flagyl, admit the patient to the hospital for further treatment. Discussed the case with Dr. Thompson for admission to the hospital.    FINAL IMPRESSION  1. Sigmoid diverticulitis            This dictation was created using voice recognition software. The accuracy of the dictation is limited to the abilities of the software. I expect there may be some errors of grammar and possibly content. The nursing notes were reviewed and certain aspects of this information were incorporated into this note.    Electronically signed by: Papo Jimenez, 2/28/2018 4:25 PM

## 2018-03-01 NOTE — CARE PLAN
Problem: Safety  Goal: Will remain free from injury  Outcome: PROGRESSING AS EXPECTED    Intervention: Provide assistance with mobility   03/01/18 0122   OTHER   Assistance / Tolerance Assistance of One;Tolerates Well   Pt educated to call for assistance with ambulation to help with unhooking SCDs and unplugging the IV        Problem: Venous Thromboembolism (VTW)/Deep Vein Thrombosis (DVT) Prevention:  Goal: Patient will participate in Venous Thrombosis (VTE)/Deep Vein Thrombosis (DVT)Prevention Measures  Outcome: PROGRESSING AS EXPECTED   02/28/18 2100   OTHER   Risk Assessment Score 2   VTE RISK Moderate   Pharmacologic Prophylaxis Used LMWH: Enoxaparin(Lovenox)

## 2018-03-01 NOTE — PROGRESS NOTES
[facility] Hospitalist Progress Note    Date of Service: 3/1/2018    Chief Complaint  72 y.o. male admitted 2018 with left flank pain, back pain. Dx severe sigmoid diverticulitis.     Interval Problem Update    Decreased pain. Afeb on IV atbs. Tolerating clears. Notes he recently has also had severe cough, congestion w wheezes.     Consultants/Specialty  None     Disposition  TBD        Review of Systems   Constitutional: Negative for chills, diaphoresis, fever and malaise/fatigue.   HENT: Positive for congestion.    Eyes: Negative for discharge and redness.   Respiratory: Positive for cough and wheezing. Negative for shortness of breath and stridor.    Cardiovascular: Negative for chest pain, palpitations and leg swelling.   Gastrointestinal: Negative for abdominal pain, diarrhea and vomiting.   Genitourinary: Positive for flank pain. Negative for hematuria.   Musculoskeletal: Positive for back pain (lower) and myalgias. Negative for joint pain.   Neurological: Negative for tremors, sensory change, speech change, focal weakness and weakness.   Psychiatric/Behavioral: Positive for substance abuse (reports previous heavy etoh use up until 1 month ago. ). Negative for hallucinations.      Physical Exam  Laboratory/Imaging   Hemodynamics  Temp (24hrs), Av.4 °C (97.6 °F), Min:36.3 °C (97.3 °F), Max:36.6 °C (97.9 °F)   Temperature: 36.6 °C (97.9 °F)  Pulse  Av.5  Min: 62  Max: 87 Heart Rate (Monitored): 67  Blood Pressure : 124/62, NIBP: 104/61      Respiratory      Respiration: 18, Pulse Oximetry: 94 %        RUL Breath Sounds: Clear, RML Breath Sounds: Diminished, RLL Breath Sounds: Diminished, ERROL Breath Sounds: Clear, LLL Breath Sounds: Diminished    Fluids    Intake/Output Summary (Last 24 hours) at 18 1342  Last data filed at 18 0900   Gross per 24 hour   Intake             1880 ml   Output                0 ml   Net             1880 ml       Nutrition  Orders Placed This Encounter   Procedures    • Diet Order     Standing Status:   Standing     Number of Occurrences:   1     Order Specific Question:   Diet:     Answer:   Clear Liquids - No Red Foods [12]     Physical Exam   Constitutional: He is oriented to person, place, and time. No distress.   HENT:   Head: Normocephalic and atraumatic.   Right Ear: External ear normal.   Left Ear: External ear normal.   Nose: Nose normal.   Eyes: Conjunctivae and EOM are normal. No scleral icterus.   Neck: Normal range of motion. No JVD present.   Cardiovascular: Normal rate and regular rhythm.    No murmur heard.  Pulmonary/Chest: Effort normal. No stridor. No respiratory distress. He has wheezes (faint expiratory ). He has no rales.   Abdominal: Soft. Bowel sounds are normal. He exhibits no distension. There is tenderness (left lower quad to deep palpation. ). There is no rebound and no guarding.   Musculoskeletal: He exhibits no edema or tenderness.   Neurological: He is alert and oriented to person, place, and time. No cranial nerve deficit.   Skin: Skin is warm and dry. He is not diaphoretic. No pallor.   Psychiatric: He has a normal mood and affect. His behavior is normal.   Vitals reviewed.      Recent Labs      02/28/18   1320  03/01/18   0500   WBC  7.1  6.0   RBC  5.24  4.53*   HEMOGLOBIN  15.0  12.8*   HEMATOCRIT  45.0  39.2*   MCV  85.9  86.5   MCH  28.6  28.3   MCHC  33.3*  32.7*   RDW  45.2  46.1   PLATELETCT  207  162*   MPV  8.9*  9.0     Recent Labs      02/28/18   1320  03/01/18   0500   SODIUM  136  139   POTASSIUM  3.7  3.9   CHLORIDE  106  108   CO2  22  24   GLUCOSE  96  102*   BUN  18  19   CREATININE  1.27  1.45*   CALCIUM  9.4  8.6                      Assessment/Plan     * Diverticulitis   Assessment & Plan    Severe sigmoid - improved symptoms.   cw IV rocephin, flagyl   IVFs   Pain control prn oxycodone, IV ms  Last colo in [place], [place] 9 years ago-- will need referral for outpt colo following acute illness, in 4- 6 weeks.        Pneumonia    Assessment & Plan    CT findings of Left basilar infiltrate w recent cough, congestion.   IV atbs as above  Treat wheezes with bronchodilator therapy .        Fatty liver   Assessment & Plan    Possibly alcoholic- reports hx of mod- heavy etoh use up till 1 month ago.   Check hep panel          Renal insufficiency- (present on admission)   Assessment & Plan    Possible ckd component- trial IVFs  Fu renal fxn, lytes, uop.         Depression   Assessment & Plan    Continue Paxil        HLD (hyperlipidemia)   Assessment & Plan    Continue simvastatin  Fatty liver- Check lipid panel.         Lymphadenopathy   Assessment & Plan    Abdominal lymphadenopathy seen on CT scan. Differential includes underlying malignancy .  Will need colo referral following discharge             Quality-Core Measures

## 2018-03-01 NOTE — CARE PLAN
Problem: Safety  Goal: Will remain free from injury  Outcome: PROGRESSING AS EXPECTED  Bed in low and locked position.  Call light within reach.  Pt states understanding the need to call for assistance before getting up.     Problem: Knowledge Deficit  Goal: Knowledge of disease process/condition, treatment plan, diagnostic tests, and medications will improve  Outcome: PROGRESSING AS EXPECTED

## 2018-03-01 NOTE — PROGRESS NOTES
Pt awake, alert and oriented. Pt medicated for pain. Pt denies nausea or vomiting at this time. Bowel sounds positive. POC discussed. Safety measures in place.

## 2018-03-02 VITALS
BODY MASS INDEX: 27.62 KG/M2 | SYSTOLIC BLOOD PRESSURE: 136 MMHG | WEIGHT: 192.9 LBS | RESPIRATION RATE: 16 BRPM | DIASTOLIC BLOOD PRESSURE: 64 MMHG | OXYGEN SATURATION: 93 % | HEART RATE: 81 BPM | TEMPERATURE: 98 F | HEIGHT: 70 IN

## 2018-03-02 LAB
ANION GAP SERPL CALC-SCNC: 9 MMOL/L (ref 0–11.9)
BUN SERPL-MCNC: 10 MG/DL (ref 8–22)
CALCIUM SERPL-MCNC: 8.1 MG/DL (ref 8.4–10.2)
CHLORIDE SERPL-SCNC: 108 MMOL/L (ref 96–112)
CO2 SERPL-SCNC: 19 MMOL/L (ref 20–33)
CREAT SERPL-MCNC: 1.11 MG/DL (ref 0.5–1.4)
GLUCOSE SERPL-MCNC: 87 MG/DL (ref 65–99)
POTASSIUM SERPL-SCNC: 3.4 MMOL/L (ref 3.6–5.5)
SODIUM SERPL-SCNC: 136 MMOL/L (ref 135–145)

## 2018-03-02 PROCEDURE — 94760 N-INVAS EAR/PLS OXIMETRY 1: CPT

## 2018-03-02 PROCEDURE — A9270 NON-COVERED ITEM OR SERVICE: HCPCS | Performed by: INTERNAL MEDICINE

## 2018-03-02 PROCEDURE — 36415 COLL VENOUS BLD VENIPUNCTURE: CPT

## 2018-03-02 PROCEDURE — 94640 AIRWAY INHALATION TREATMENT: CPT

## 2018-03-02 PROCEDURE — 700102 HCHG RX REV CODE 250 W/ 637 OVERRIDE(OP): Performed by: INTERNAL MEDICINE

## 2018-03-02 PROCEDURE — 700111 HCHG RX REV CODE 636 W/ 250 OVERRIDE (IP): Performed by: INTERNAL MEDICINE

## 2018-03-02 PROCEDURE — 80048 BASIC METABOLIC PNL TOTAL CA: CPT

## 2018-03-02 PROCEDURE — 700101 HCHG RX REV CODE 250: Performed by: HOSPITALIST

## 2018-03-02 PROCEDURE — 700105 HCHG RX REV CODE 258: Performed by: INTERNAL MEDICINE

## 2018-03-02 PROCEDURE — 99239 HOSP IP/OBS DSCHRG MGMT >30: CPT | Performed by: HOSPITALIST

## 2018-03-02 RX ORDER — METRONIDAZOLE 500 MG/1
500 TABLET ORAL 3 TIMES DAILY
Qty: 30 TAB | Refills: 0 | Status: SHIPPED | OUTPATIENT
Start: 2018-03-02 | End: 2018-03-12

## 2018-03-02 RX ORDER — TRAMADOL HYDROCHLORIDE 50 MG/1
50 TABLET ORAL EVERY 6 HOURS PRN
Qty: 20 TAB | Refills: 0 | Status: SHIPPED | OUTPATIENT
Start: 2018-03-02 | End: 2018-03-07

## 2018-03-02 RX ORDER — CEFDINIR 300 MG/1
300 CAPSULE ORAL 2 TIMES DAILY
Qty: 20 CAP | Refills: 0 | Status: SHIPPED | OUTPATIENT
Start: 2018-03-02 | End: 2018-03-12

## 2018-03-02 RX ADMIN — SODIUM CHLORIDE: 9 INJECTION, SOLUTION INTRAVENOUS at 03:09

## 2018-03-02 RX ADMIN — METRONIDAZOLE 500 MG: 500 TABLET ORAL at 06:22

## 2018-03-02 RX ADMIN — CEFTRIAXONE 2 G: 2 INJECTION, POWDER, FOR SOLUTION INTRAMUSCULAR; INTRAVENOUS at 07:57

## 2018-03-02 RX ADMIN — IPRATROPIUM BROMIDE AND ALBUTEROL SULFATE 3 ML: .5; 3 SOLUTION RESPIRATORY (INHALATION) at 07:16

## 2018-03-02 RX ADMIN — ASPIRIN 325 MG ORAL TABLET 162.5 MG: 325 PILL ORAL at 07:57

## 2018-03-02 RX ADMIN — FENOFIBRATE 134 MG: 134 CAPSULE ORAL at 07:57

## 2018-03-02 RX ADMIN — PAROXETINE HYDROCHLORIDE 20 MG: 20 TABLET, FILM COATED ORAL at 07:57

## 2018-03-02 RX ADMIN — IPRATROPIUM BROMIDE AND ALBUTEROL SULFATE 3 ML: .5; 3 SOLUTION RESPIRATORY (INHALATION) at 10:53

## 2018-03-02 RX ADMIN — METRONIDAZOLE 500 MG: 500 TABLET ORAL at 15:23

## 2018-03-02 RX ADMIN — IPRATROPIUM BROMIDE AND ALBUTEROL SULFATE 3 ML: .5; 3 SOLUTION RESPIRATORY (INHALATION) at 15:24

## 2018-03-02 RX ADMIN — SIMVASTATIN 10 MG: 10 TABLET, FILM COATED ORAL at 07:57

## 2018-03-02 ASSESSMENT — PAIN SCALES - GENERAL: PAINLEVEL_OUTOF10: 3

## 2018-03-02 NOTE — PROGRESS NOTES
"Received report from night RN.  Assumed pt care.  Pt states, \"I feel better.\"  Pt currently receiving breathing treatment.  Updated w/ POC.  Will continue with care.   "

## 2018-03-02 NOTE — FLOWSHEET NOTE
03/01/18 2125   Interdisciplinary Plan of Care-Goals (Indications)   Obstructive Ventilatory Defect or Pulmonary Disease without Obvious Obstruction Physical Exam / Hyperinflation / Wheezing (bronchospasm)   Hyperinflation Protocol Indications Restrictive Lung Disorder / Consolidation   Interdisciplinary Plan of Care-Outcomes    Bronchodilator Outcome Improved Vital Signs and Measures of Gas Exchange   Hyperinflation Protocol Goals/Outcome Stable Vital Capacity x24 hrs and Patient Understands / uses I.S.   Education   Education Yes - Pt. / Family has been Instructed in use of Respiratory Equipment;Yes - Pt. / Family has been Instructed in use of Respiratory Medications and Adverse Reactions   SVN Group   #SVN Performed Yes   Given By: Mouthpiece   Incentive Spirometry Group   Incentive Spirometry Instruction Yes   Breathing Exercises Yes   Incentive Spirometer Volume 2700 mL   Respiratory WDL   Respiratory (WDL) X   Chest Exam   Work Of Breathing / Effort Mild   Respiration 18   Pulse (!) 104   Breath Sounds   Pre/Post Intervention Pre Intervention Assessment   RUL Breath Sounds Expiratory Wheezes   RML Breath Sounds Expiratory Wheezes   RLL Breath Sounds Diminished   ERROL Breath Sounds Expiratory Wheezes   LLL Breath Sounds Diminished   Secretions   Cough (none noted at this time )   Oximetry   #Pulse Oximetry (Single Determination) Yes   Oxygen   Home O2 Use Prior To Admission? No   Pulse Oximetry 94 %   O2 (LPM) 0   O2 Daily Delivery Respiratory  Room Air with O2 Available

## 2018-03-02 NOTE — FLOWSHEET NOTE
03/02/18 1529   Interdisciplinary Plan of Care-Goals (Indications)   Obstructive Ventilatory Defect or Pulmonary Disease without Obvious Obstruction Physical Exam / Hyperinflation / Wheezing (bronchospasm)   Hyperinflation Protocol Indications Restrictive Lung Disorder / Consolidation   Interdisciplinary Plan of Care-Outcomes    Bronchodilator Outcome Patient at Stable Baseline   Hyperinflation Protocol Goals/Outcome Stable Vital Capacity x24 hrs and Patient Understands / uses I.S.   SVN Group   #SVN Performed Yes   Given By: Mouthpiece   Chest Exam   Work Of Breathing / Effort Mild   Respiration 16  (post16)   Pulse 73  (post 81)   Heart Rate (Monitored) 73   Breath Sounds   Pre/Post Intervention Pre Intervention Assessment  (increased aeration after svn)   RUL Breath Sounds Diminished   RML Breath Sounds Diminished   RLL Breath Sounds Diminished   ERROL Breath Sounds Diminished   LLL Breath Sounds Diminished   Oximetry   #Pulse Oximetry (Single Determination) Yes   Oxygen   Pulse Oximetry 93 %   O2 Daily Delivery Respiratory  Room Air with O2 Available

## 2018-03-02 NOTE — FLOWSHEET NOTE
03/02/18 0718   Interdisciplinary Plan of Care-Goals (Indications)   Obstructive Ventilatory Defect or Pulmonary Disease without Obvious Obstruction Physical Exam / Hyperinflation / Wheezing (bronchospasm)   Hyperinflation Protocol Indications Restrictive Lung Disorder / Consolidation   Interdisciplinary Plan of Care-Outcomes    Bronchodilator Outcome Improved Vital Signs and Measures of Gas Exchange   Hyperinflation Protocol Goals/Outcome Stable Vital Capacity x24 hrs and Patient Understands / uses I.S.   SVN Group   #SVN Performed Yes   Given By: Mouthpiece   Incentive Spirometry Group   Breathing Exercises Yes   Incentive Spirometer Volume 3500 mL   Respiratory WDL   Respiratory (WDL) X   Chest Exam   Work Of Breathing / Effort Mild   Respiration 16  (post 16)   Pulse 68  (post 80)   Heart Rate (Monitored) 68   Breath Sounds   Pre/Post Intervention Pre Intervention Assessment  (increased aeration following svn)   RUL Breath Sounds Clear   RML Breath Sounds Clear;Diminished   RLL Breath Sounds Diminished   ERROL Breath Sounds Clear   LLL Breath Sounds Diminished   Secretions   Cough Productive   How Sputum Obtained Expectorated   Sputum Amount Moderate   Sputum Color White;Yellow   Sputum Consistency Thick   Oximetry   #Pulse Oximetry (Single Determination) Yes   Oxygen   Pulse Oximetry 93 %   O2 Daily Delivery Respiratory  Room Air with O2 Available

## 2018-03-02 NOTE — FLOWSHEET NOTE
03/02/18 1058   Events/Summary/Plan   Events/Summary/Plan pt appears to be taking a nap, svn given   Interdisciplinary Plan of Care-Goals (Indications)   Obstructive Ventilatory Defect or Pulmonary Disease without Obvious Obstruction Physical Exam / Hyperinflation / Wheezing (bronchospasm)   Hyperinflation Protocol Indications Restrictive Lung Disorder / Consolidation   Interdisciplinary Plan of Care-Outcomes    Bronchodilator Outcome Improved Vital Signs and Measures of Gas Exchange;Patient at Stable Baseline   Hyperinflation Protocol Goals/Outcome Stable Vital Capacity x24 hrs and Patient Understands / uses I.S.   SVN Group   #SVN Performed Yes   Given By: Mask   Chest Exam   Work Of Breathing / Effort Mild   Respiration 16  (post 16)   Pulse 70  (post 77)   Heart Rate (Monitored) 70   Breath Sounds   Pre/Post Intervention Pre Intervention Assessment  (increased aeration following svn)   RUL Breath Sounds Diminished   RML Breath Sounds Diminished   RLL Breath Sounds Diminished   ERROL Breath Sounds Diminished   LLL Breath Sounds Fine Crackles   Oximetry   #Pulse Oximetry (Single Determination) Yes   Oxygen   Pulse Oximetry 93 %   O2 Daily Delivery Respiratory  Room Air with O2 Available

## 2018-03-02 NOTE — DISCHARGE PLANNING
Medical Social Work     SW intern gave IMM letter.     This note has been reviewed by Anna KAPADIA

## 2018-03-02 NOTE — CARE PLAN
Problem: Oxygenation:  Goal: Maintain adequate oxygenation dependent on patient condition  Outcome: PROGRESSING AS EXPECTED  Pt at baseline room air     Problem: Bronchoconstriction:  Goal: Improve in air movement and diminished wheezing  Outcome: PROGRESSING SLOWER THAN EXPECTED  Diminished wheezing and volume of air movement increased     Improvement in airflow    Improved vital signs and measures of gas exchange          Intervention: Implement inhaled treatments  Duoneb QID and PRN   Intervention: Evaluate and manage medication effects  Improved patient appearance with subjective improvement of symptom alleviation after treatment.

## 2018-03-03 NOTE — CARE PLAN
Problem: Oxygenation:  Goal: Maintain adequate oxygenation dependent on patient condition  Outcome: MET Date Met: 03/02/18  RA=93%  Intervention: Manage oxygen therapy by monitoring pulse oximetry and/or ABG values  02 saturation greater or equal to 92%  Intervention: Levels of oxygenation will improve to baseline  Pt states he uses no supplemental 02      Problem: Bronchoconstriction:  Goal: Improve in air movement and diminished wheezing  Outcome: PROGRESSING AS EXPECTED  BS clear and diminished throughout with increased aeration following svn  Intervention: Implement inhaled treatments  Duoneb nebulizer treatments are being administered QID and PRN  Intervention: Evaluate and manage medication effects  Increase in aeration following nebulizer treatments. Pt states benefit from treatments      Problem: Hyperinflation:  Goal: Prevent or improve atelectasis  Outcome: PROGRESSING AS EXPECTED  IQ=8500  Intervention: Instruct incentive spirometry usage  Instructed to use IS 10 times every hour  Intervention: Perform hyperinflation therapy as indicated by assessment  IS following nebulizer treatments

## 2018-03-03 NOTE — PROGRESS NOTES
Discharging pt per MD orders.  Pt states understanding of all discharge instructions, educational handouts, and prescriptions.  All belongings with pt.  Patient ambulated off unit with RN.

## 2018-03-03 NOTE — DISCHARGE SUMMARY
Hospital Medicine Discharge Note     Admit Date:  2/28/2018       Discharge Date:   3/2/2018    Attending Physician:  Kumar Sanches M.D.      Diagnoses (includes active and resolved):     Principal Problem:    Diverticulitis POA: Clinically improved  Active Problems:    Pneumonia POA: Community-acquired, improved    Renal insufficiency POA: Resolved    Fatty liver POA: Unknown    Lymphadenopathy POA: Recommend outpatient follow-up    Mediastinal adenopathy, recommend outpatient follow    HLD (hyperlipidemia) POA: Unknown    Depression POA: Unknown        Hospital Summary (Brief Narrative):         Patient is a pleasant 72-year-old male history of depression PACs on metoprolol, obesity, heavy alcohol use reportedly had stopped  about a month ago was admitted with left lower back pain.  CT findings of severe sigmoid diverticulitis.  Patient was placed on IV Rocephin, Flagyl.  Patient's left-sided pain markedly improved.  He also had complaints of cough wheezing and congestion with  findings on CT of basilar infiltrate.  With ambulatory treatment for his diverticulitis he had marked improvement in his cough and congestion.  He had also findings of hepatic steatosis, splenomegaly with multiple cysts, viral hepatitis panel negative.  Is felt likely to be due to his heavy alcohol use possible alcoholic fatty liver disease.  Findings of adenopathy in the mediastinum and on abdominal CT were discussed at length with patient.  Is recommending outpatient follow-up with his primary care provider to ensure resolution, further workup if indicated.  I discussed case with Dr. Sudhakar Santos, with GI.  Is recommended he have follow-up in the GI clinic for evaluation colonoscopy to rule out malignancy in prostate 4 weeks.  Also recommended to have further imaging with CT scan in approximately 3 months to ensure resolution of his mediastinal adenopathy following treatment of pneumonia.             Imaging/ Testing:      CT-RENAL COLIC  EVALUATION(A/P W/O)   Final Result      Severe acute sigmoid diverticulitis with trace adjacent fluid. There are also some small adjacent lymph nodes. These are most likely reactive although malignant origin is not excluded. After the acute clinical situation has resolved, colorectal carcinoma    screening is recommended as superimposed mass is a possibility      Severe hepatic steatosis and multiple cysts      Mild splenomegaly      Mild left lower lobe consolidation and there is bilateral bronchial wall thickening dependently suspicious for aspiration      Mild posterior mediastinal lymphadenopathy is indeterminate. Recommend clinical correlation for dysphagia/esophageal symptoms.            Procedures:          None     Discharge Medications:             Medication List      START taking these medications      Instructions   cefdinir 300 MG Caps  Commonly known as:  OMNICEF   Take 1 Cap by mouth 2 times a day for 10 days.  Dose:  300 mg     metroNIDAZOLE 500 MG Tabs  Commonly known as:  FLAGYL   Take 1 Tab by mouth 3 times a day for 10 days.  Dose:  500 mg     tramadol 50 MG Tabs  Commonly known as:  ULTRAM   Take 1 Tab by mouth every 6 hours as needed for Moderate Pain for up to 5 days.  Dose:  50 mg        CONTINUE taking these medications      Instructions   aspirin 325 MG Tabs  Commonly known as:  ASA   Take 162.5 mg by mouth every day.  Dose:  162.5 mg     fenofibrate 160 MG tablet  Commonly known as:  TRIGLIDE   Take 160 mg by mouth every day.  Dose:  160 mg     metoprolol SR 50 MG Tb24  Commonly known as:  TOPROL XL   Take 50 mg by mouth every evening.  Dose:  50 mg     PARoxetine 20 MG Tabs  Commonly known as:  PAXIL   Take 20 mg by mouth every day.  Dose:  20 mg     simvastatin 10 MG Tabs  Commonly known as:  ZOCOR   Take 10 mg by mouth every day.  Dose:  10 mg               Diet:       DIET ORDERS (Through next 24h)    Advance to High fiber as dale            Activity:   As tolerated.      Code status:    Full code    Primary Care Provider:    Smooth Tsang M.D.    Follow up appointment details :      .  [facility], Emergency Dept  [address]  [place] [id number] [phone]    If symptoms worsen    Smooth Tsang M.D.  [address]  [address]  [place] [address]  [phone]    In 1 week      Manjit Moreno M.D.  [address]  [place] [address]  [phone]    In 4 weeks      Future Appointments  Date Time Provider Department [place]   3/5/2018 8:20 AM CARE MANAGER [facility]   3/6/2018 11:20 AM MICHAEL Little [facility]   4/3/2018 5:20 PM Smooth Tsang M.D. [facility]           Time spent on discharge day patient visit: 40 minutes    #################################################

## 2018-03-03 NOTE — DISCHARGE INSTRUCTIONS
Diverticulitis  Diverticulitis is inflammation or infection of small pouches in your colon that form when you have a condition called diverticulosis. The pouches in your colon are called diverticula. Your colon, or large intestine, is where water is absorbed and stool is formed.  Complications of diverticulitis can include:  · Bleeding.  · Severe infection.  · Severe pain.  · Perforation of your colon.  · Obstruction of your colon.  What are the causes?  Diverticulitis is caused by bacteria.  Diverticulitis happens when stool becomes trapped in diverticula. This allows bacteria to grow in the diverticula, which can lead to inflammation and infection.  What increases the risk?  People with diverticulosis are at risk for diverticulitis. Eating a diet that does not include enough fiber from fruits and vegetables may make diverticulitis more likely to develop.  What are the signs or symptoms?  Symptoms of diverticulitis may include:  · Abdominal pain and tenderness. The pain is normally located on the left side of the abdomen, but may occur in other areas.  · Fever and chills.  · Bloating.  · Cramping.  · Nausea.  · Vomiting.  · Constipation.  · Diarrhea.  · Blood in your stool.  How is this diagnosed?  Your health care provider will ask you about your medical history and do a physical exam. You may need to have tests done because many medical conditions can cause the same symptoms as diverticulitis. Tests may include:  · Blood tests.  · Urine tests.  · Imaging tests of the abdomen, including X-rays and CT scans.  When your condition is under control, your health care provider may recommend that you have a colonoscopy. A colonoscopy can show how severe your diverticula are and whether something else is causing your symptoms.  How is this treated?  Most cases of diverticulitis are mild and can be treated at home. Treatment may include:  · Taking over-the-counter pain medicines.  · Following a clear liquid diet.  · Taking  antibiotic medicines by mouth for 7-10 days.  More severe cases may be treated at a hospital. Treatment may include:  · Not eating or drinking.  · Taking prescription pain medicine.  · Receiving antibiotic medicines through an IV tube.  · Receiving fluids and nutrition through an IV tube.  · Surgery.  Follow these instructions at home:  · Follow your health care provider’s instructions carefully.  · Follow a full liquid diet or other diet as directed by your health care provider. After your symptoms improve, your health care provider may tell you to change your diet. He or she may recommend you eat a high-fiber diet. Fruits and vegetables are good sources of fiber. Fiber makes it easier to pass stool.  · Take fiber supplements or probiotics as directed by your health care provider.  · Only take medicines as directed by your health care provider.  · Keep all your follow-up appointments.  Contact a health care provider if:  · Your pain does not improve.  · You have a hard time eating food.  · Your bowel movements do not return to normal.  Get help right away if:  · Your pain becomes worse.  · Your symptoms do not get better.  · Your symptoms suddenly get worse.  · You have a fever.  · You have repeated vomiting.  · You have bloody or black, tarry stools.  This information is not intended to replace advice given to you by your health care provider. Make sure you discuss any questions you have with your health care provider.  Document Released: 09/27/2006 Document Revised: 05/25/2017 Document Reviewed: 11/12/2014  [employer] Patient Education © 2017 [employer]      Community-Acquired Pneumonia, Adult  Introduction  Pneumonia is an infection of the lungs. One type of pneumonia can happen while a person is in a hospital. A different type can happen when a person is not in a hospital (community-acquired pneumonia). It is easy for this kind to spread from person to person. It can spread to you if you breathe  near an infected person who coughs or sneezes. Some symptoms include:  · A dry cough.  · A wet (productive) cough.  · Fever.  · Sweating.  · Chest pain.  Follow these instructions at home:  · Take over-the-counter and prescription medicines only as told by your doctor.  ¨ Only take cough medicine if you are losing sleep.  ¨ If you were prescribed an antibiotic medicine, take it as told by your doctor. Do not stop taking the antibiotic even if you start to feel better.  · Sleep with your head and neck raised (elevated). You can do this by putting a few pillows under your head, or you can sleep in a recliner.  · Do not use tobacco products. These include cigarettes, chewing tobacco, and e-cigarettes. If you need help quitting, ask your doctor.  · Drink enough water to keep your pee (urine) clear or pale yellow.  A shot (vaccine) can help prevent pneumonia. Shots are often suggested for:  · People older than 65 years of age.  · People older than 19 years of age:  ¨ Who are having cancer treatment.  ¨ Who have long-term (chronic) lung disease.  ¨ Who have problems with their body's defense system (immune system).  You may also prevent pneumonia if you take these actions:  · Get the flu (influenza) shot every year.  · Go to the dentist as often as told.  · Wash your hands often. If soap and water are not available, use hand .  Contact a doctor if:  · You have a fever.  · You lose sleep because your cough medicine does not help.  Get help right away if:  · You are short of breath and it gets worse.  · You have more chest pain.  · Your sickness gets worse. This is very serious if:  ¨ You are an older adult.  ¨ Your body's defense system is weak.  · You cough up blood.  This information is not intended to replace advice given to you by your health care provider. Make sure you discuss any questions you have with your health care provider.  Document Released: 06/05/2009 Document Revised: 05/25/2017 Document Reviewed:  04/13/2016  © 2017 [employer]    Discharge Instructions    Discharged to home by car with relative. Discharged via wheelchair, hospital escort: Yes.  Special equipment needed: Not Applicable    Be sure to schedule a follow-up appointment with your primary care doctor or any specialists as instructed.     Discharge Plan:   Diet Plan: Discussed  Activity Level: Discussed  Confirmed Follow up Appointment: Patient to Call and Schedule Appointment  Confirmed Symptoms Management: Discussed  Medication Reconciliation Updated: Yes  Pneumococcal Vaccine Given - only chart on this line when given: Given (See MAR)  Influenza Vaccine Indication: Not indicated: Previously immunized this influenza season and > 8 years of age    I understand that a diet low in cholesterol, fat, and sodium is recommended for good health. Unless I have been given specific instructions below for another diet, I accept this instruction as my diet prescription.   Other diet: Advance as tolerated    Special Instructions: None    · Is patient discharged on Warfarin / Coumadin?   No     Depression / Suicide Risk    As you are discharged from this [facility] facility, it is important to learn how to keep safe from harming yourself.    Recognize the warning signs:  · Abrupt changes in personality, positive or negative- including increase in energy   · Giving away possessions  · Change in eating patterns- significant weight changes-  positive or negative  · Change in sleeping patterns- unable to sleep or sleeping all the time   · Unwillingness or inability to communicate  · Depression  · Unusual sadness, discouragement and loneliness  · Talk of wanting to die  · Neglect of personal appearance   · Rebelliousness- reckless behavior  · Withdrawal from people/activities they love  · Confusion- inability to concentrate     If you or a loved one observes any of these behaviors or has concerns about self-harm, here's what you can do:  · Talk about it- your  feelings and reasons for harming yourself  · Remove any means that you might use to hurt yourself (examples: pills, rope, extension cords, firearm)  · Get professional help from the community (Mental Health, Substance Abuse, psychological counseling)  · Do not be alone:Call your Safe Contact- someone whom you trust who will be there for you.  · Call your local CRISIS HOTLINE 593-6446 or 030-680-8918  · Call your local Children's Mobile Crisis Response Team [place] (032) 472-8693 or www.Seamless  · Call the toll free National Suicide Prevention Hotlines   · National Suicide Prevention Lifeline 019-369-KSHP (4330)  · National Hope Line Network 800-SUICIDE (179-6915)

## 2018-03-05 ENCOUNTER — PATIENT OUTREACH (OUTPATIENT)
Dept: HEALTH INFORMATION MANAGEMENT | Facility: OTHER | Age: 73
End: 2018-03-05

## 2018-03-08 ENCOUNTER — OFFICE VISIT (OUTPATIENT)
Dept: MEDICAL GROUP | Facility: CLINIC | Age: 73
End: 2018-03-08
Payer: MEDICARE

## 2018-03-08 VITALS
DIASTOLIC BLOOD PRESSURE: 64 MMHG | OXYGEN SATURATION: 94 % | BODY MASS INDEX: 28.06 KG/M2 | HEART RATE: 56 BPM | WEIGHT: 196 LBS | RESPIRATION RATE: 16 BRPM | TEMPERATURE: 96.4 F | HEIGHT: 70 IN | SYSTOLIC BLOOD PRESSURE: 100 MMHG

## 2018-03-08 DIAGNOSIS — K76.0 FATTY LIVER: ICD-10-CM

## 2018-03-08 DIAGNOSIS — K57.32 DIVERTICULITIS OF LARGE INTESTINE WITHOUT PERFORATION OR ABSCESS WITHOUT BLEEDING: ICD-10-CM

## 2018-03-08 DIAGNOSIS — Z09 HOSPITAL DISCHARGE FOLLOW-UP: ICD-10-CM

## 2018-03-08 PROCEDURE — 99495 TRANSJ CARE MGMT MOD F2F 14D: CPT | Performed by: NURSE PRACTITIONER

## 2018-03-08 ASSESSMENT — ENCOUNTER SYMPTOMS
COUGH: 0
DIZZINESS: 0
CHILLS: 0
VOMITING: 0
NERVOUS/ANXIOUS: 0
ABDOMINAL PAIN: 0
FOCAL WEAKNESS: 0
HEADACHES: 0
DEPRESSION: 0
WEAKNESS: 0
WHEEZING: 0
NAUSEA: 0
PALPITATIONS: 0
HEARTBURN: 0
SPUTUM PRODUCTION: 0
FEVER: 0
BLOOD IN STOOL: 0
SHORTNESS OF BREATH: 0
FALLS: 0
MYALGIAS: 0

## 2018-03-08 NOTE — PROGRESS NOTES
Subjective:     Papo Souza is a 72 y.o. male who presents for Hospital Follow-up.  Chart reviewed. Discharge summary available for review: Yes   Date of discharge 3/2/2018.  48- hour post discharge RN call completed on 3/5/2018 and documented in the medical record by Stacy Finley.    HPI: Recently hospitalized for left upper abdominal to flank pain, seen at [facility] first on 2/26, no image done, only given dilaudid, return to ER at [facility] on 2/28 for worsening symptoms, found to have severe acute sigmoid diverticulitis, improved with cefdinir and flagyl. Lymphoadenopathy also noted, outpatient follow up after the resolution of diverticulitis is recommended.     Since returning home, patient reports feeling better. He is taking medications as instructed. He has already gotten phone call from [facility] to inform him that they will call him again to schedule the appointment when time closer. Pt will have colonoscopy in about 6-8 weeks.      The patient denied increased weakness; no difficulty taking care of self at home.    New to PCP Dr. Tsang on 4/3.    Patient Active Problem List    Diagnosis Date Noted   • Pneumonia 03/01/2018     Priority: High   • Diverticulitis 02/28/2018     Priority: High   • History of cocaine use 10/12/2013     Priority: High   • Atrial fibrillation with RVR (CMS-HCC) 10/12/2013     Priority: High   • Fatty liver 03/01/2018     Priority: Medium   • Renal insufficiency 10/12/2013     Priority: Medium   • Lymphadenopathy 02/28/2018     Priority: Low   • HLD (hyperlipidemia) 02/28/2018     Priority: Low   • Depression 02/28/2018     Priority: Low   • Pema-Brasher tear 10/15/2013   • Elevated CPK 10/12/2013   • Inguinal hernia 03/26/2013         Allergies:   Amoxicillin    Social History:  Social History   Substance Use Topics   • Smoking status: Former Smoker   • Smokeless tobacco: Never Used   • Alcohol use Yes      Comment: Occasionally     "    ROS:  Review of Systems   Constitutional: Negative for chills, fever and malaise/fatigue.   Respiratory: Negative for cough, sputum production, shortness of breath and wheezing.    Cardiovascular: Negative for chest pain, palpitations and leg swelling.   Gastrointestinal: Negative for abdominal pain, blood in stool, heartburn, nausea and vomiting.   Genitourinary: Negative for dysuria, frequency and urgency.   Musculoskeletal: Negative for falls and myalgias.   Skin: Negative for rash.   Neurological: Negative for dizziness, focal weakness, weakness and headaches.   Psychiatric/Behavioral: Negative for depression. The patient is not nervous/anxious.         Objective:     Blood pressure 100/64, pulse (!) 56, temperature (!) 35.8 °C (96.4 °F), resp. rate 16, height 1.778 m (5' 10\"), weight 88.9 kg (196 lb), SpO2 94 %.     Physical Exam:  Physical Exam   Constitutional: He is oriented to person, place, and time and well-developed, well-nourished, and in no distress.   HENT:   Head: Normocephalic and atraumatic.   Eyes: Conjunctivae are normal.   Neck: Neck supple. No JVD present.   Cardiovascular: Regular rhythm.    Pulmonary/Chest: Effort normal and breath sounds normal. No respiratory distress.   Abdominal: Soft. Bowel sounds are normal. He exhibits no distension. There is no tenderness. There is no rebound.   Musculoskeletal: Normal range of motion. He exhibits no edema.   Neurological: He is alert and oriented to person, place, and time.   Skin: Skin is warm.   Vitals reviewed.        Assessment and Plan:     1. Hospital discharge follow-up  Hospitalization and results reviewed with patient. High risk conditions requiring teaching or care coordination were identified and addressed.The patient demonstrate understanding of admission and underlying conditions. The patient understands discharge instructions and when to seek medical attention. Medications reviewed including instructions regarding high risk " medications, dosing and side effects.    The patient is able to safely adhere to ADL/IADL, treatment and medication regimen, self-manage of high-risk conditions? Yes   The patient requires physical therapy/home health/DME referral? No   The patient requires referral to care coordination/behavioral health/social work?  No   Patient requires referral for pharmacy consult? No   Advance directive/POLST on file?  No   Required counseled on advance directive?  No     New to PCP Dr. Tsang on 4/3.  Pt is noted to have bradycardia at 56, apical 55 full minute, regular. Pt denied symptoms. He is on toprolol xl due to hx of afib. He is instructed to check his bp and hr should he has dizziness or weakness. If BP is lower than 90 or HR < 50 or if he has symptoms, will have to cut down his Toprolol. He verbalized understanding.     2. Fatty liver  Pt would like to know what he has to monitor for his fatty liver. Pt is instructed to watch weight, low fat diet, no alcohol, stay on cholesterol medications and follow up with GI. He is also instructed to limit tylenol < 2g per day if he really has to take it for any kind of reason like pain or fever.     3. Diverticulitis of large intestine without perforation or abscess without bleeding  Continue omnicef and flagyl to complete the course  - Follow up with GI (digestive health) for colonoscopy in 6-8 weeks.       Medication Reconciliation  Medication list at end of encounter:   Current Outpatient Prescriptions   Medication Sig Dispense Refill   • metroNIDAZOLE (FLAGYL) 500 MG Tab Take 1 Tab by mouth 3 times a day for 10 days. 30 Tab 0   • cefdinir (OMNICEF) 300 MG Cap Take 1 Cap by mouth 2 times a day for 10 days. 20 Cap 0   • aspirin (ASA) 325 MG Tab Take 162.5 mg by mouth every day.     • fenofibrate (TRIGLIDE) 160 MG tablet Take 160 mg by mouth every day.     • paroxetine (PAXIL) 20 MG Tab Take 20 mg by mouth every day.     • metoprolol SR (TOPROL XL) 50 MG TB24 Take 50 mg by mouth  every evening.     • simvastatin (ZOCOR) 10 MG TABS Take 10 mg by mouth every day.       No current facility-administered medications for this visit.        Primary care follow-up:  New health conditions identified during hospitalization? Yes   Labs/pathology/imaging requires future PCP follow-up?  No   Changes to medications during hospitalization or today? Yes during hospitalization    Recommended followup: No Follow-up on file. with Smooth Tsang M.D.   Future Appointments       Provider Department [place]    3/8/2018 10:20 AM [facility]    4/3/2018 5:20 PM Smooth Tsang M.D. [facility]          Patient Instruction  Patient offered educational material on discharge diagnosis and management of symptoms/red flags. Patient instructed to keep follow-up appointments and to bring written questions and and actual medications to each office visit. Patient instructed to call PCP/specialist with any problems/questions/concerns. Patient verbalizes understanding and has no further questions at this time.    Face-to-face transitional care management services with moderate complexity medical decision making.

## 2018-03-08 NOTE — PATIENT INSTRUCTIONS
If you need further assistance, or have any questions; concerns or lingering symptoms before seeing your Primary Care Provider or specialist.     Do not hesitate to contact us.     Please contact us at the Post-Hospital Follow Up Program at [phone].   Our offices hours are Monday-Friday 8 am-5 pm.    Diverticulitis  Diverticulitis is inflammation or infection of small pouches in your colon that form when you have a condition called diverticulosis. The pouches in your colon are called diverticula. Your colon, or large intestine, is where water is absorbed and stool is formed.  Complications of diverticulitis can include:  · Bleeding.  · Severe infection.  · Severe pain.  · Perforation of your colon.  · Obstruction of your colon.  What are the causes?  Diverticulitis is caused by bacteria.  Diverticulitis happens when stool becomes trapped in diverticula. This allows bacteria to grow in the diverticula, which can lead to inflammation and infection.  What increases the risk?  People with diverticulosis are at risk for diverticulitis. Eating a diet that does not include enough fiber from fruits and vegetables may make diverticulitis more likely to develop.  What are the signs or symptoms?  Symptoms of diverticulitis may include:  · Abdominal pain and tenderness. The pain is normally located on the left side of the abdomen, but may occur in other areas.  · Fever and chills.  · Bloating.  · Cramping.  · Nausea.  · Vomiting.  · Constipation.  · Diarrhea.  · Blood in your stool.  How is this diagnosed?  Your health care provider will ask you about your medical history and do a physical exam. You may need to have tests done because many medical conditions can cause the same symptoms as diverticulitis. Tests may include:  · Blood tests.  · Urine tests.  · Imaging tests of the abdomen, including X-rays and CT scans.  When your condition is under control, your health care provider may recommend that you have a  colonoscopy. A colonoscopy can show how severe your diverticula are and whether something else is causing your symptoms.  How is this treated?  Most cases of diverticulitis are mild and can be treated at home. Treatment may include:  · Taking over-the-counter pain medicines.  · Following a clear liquid diet.  · Taking antibiotic medicines by mouth for 7-10 days.  More severe cases may be treated at a hospital. Treatment may include:  · Not eating or drinking.  · Taking prescription pain medicine.  · Receiving antibiotic medicines through an IV tube.  · Receiving fluids and nutrition through an IV tube.  · Surgery.  Follow these instructions at home:  · Follow your health care provider’s instructions carefully.  · Follow a full liquid diet or other diet as directed by your health care provider. After your symptoms improve, your health care provider may tell you to change your diet. He or she may recommend you eat a high-fiber diet. Fruits and vegetables are good sources of fiber. Fiber makes it easier to pass stool.  · Take fiber supplements or probiotics as directed by your health care provider.  · Only take medicines as directed by your health care provider.  · Keep all your follow-up appointments.  Contact a health care provider if:  · Your pain does not improve.  · You have a hard time eating food.  · Your bowel movements do not return to normal.  Get help right away if:  · Your pain becomes worse.  · Your symptoms do not get better.  · Your symptoms suddenly get worse.  · You have a fever.  · You have repeated vomiting.  · You have bloody or black, tarry stools.  This information is not intended to replace advice given to you by your health care provider. Make sure you discuss any questions you have with your health care provider.  Document Released: 09/27/2006 Document Revised: 05/25/2017 Document Reviewed: 11/12/2014  [employer] Patient Education © 2017 [employer]

## 2018-03-08 NOTE — PROGRESS NOTES
POST DISCHARGE CALL MADE BY Stacy Finley.  Discharge Date:3/2/2018   Date of Outreach Call: 3/5/2018  8:23 AM  Now that you're home, how are you doing? Good  Comment:Patient denies any problems.  States he is  feeling better  Do you have questions about your medications? No    Did you fill your medications? Yes    Do you have a follow-up appointment scheduled?Yes  Comment:Post discharge clinic on 3/8/18. Reports no  transportation issues    Discharging Department: [facility]    Number of Attempts: 1  Current or previous attempts completed within two business days of discharge? Yes  Provided education regarding treatment plan, medication, self-management, ADLs? Yes  Has patient completed Advance Directive? If yes, advise them to bring to appointment. No  Care Manager phone number provided? Yes  Is there anything else I can help you with? No

## 2018-04-19 ENCOUNTER — OFFICE VISIT (OUTPATIENT)
Dept: MEDICAL GROUP | Facility: MEDICAL CENTER | Age: 73
End: 2018-04-19
Payer: MEDICARE

## 2018-04-19 VITALS
WEIGHT: 187 LBS | HEIGHT: 70 IN | DIASTOLIC BLOOD PRESSURE: 70 MMHG | HEART RATE: 64 BPM | OXYGEN SATURATION: 96 % | TEMPERATURE: 97.2 F | SYSTOLIC BLOOD PRESSURE: 122 MMHG | BODY MASS INDEX: 26.77 KG/M2

## 2018-04-19 DIAGNOSIS — I10 ESSENTIAL HYPERTENSION: ICD-10-CM

## 2018-04-19 DIAGNOSIS — K22.6 MALLORY-WEISS TEAR: ICD-10-CM

## 2018-04-19 DIAGNOSIS — E78.5 HYPERLIPIDEMIA, UNSPECIFIED HYPERLIPIDEMIA TYPE: ICD-10-CM

## 2018-04-19 DIAGNOSIS — I48.91 ATRIAL FIBRILLATION WITH RVR (HCC): ICD-10-CM

## 2018-04-19 DIAGNOSIS — K57.32 DIVERTICULITIS OF LARGE INTESTINE WITHOUT PERFORATION OR ABSCESS WITHOUT BLEEDING: ICD-10-CM

## 2018-04-19 DIAGNOSIS — F33.0 MILD EPISODE OF RECURRENT MAJOR DEPRESSIVE DISORDER (HCC): ICD-10-CM

## 2018-04-19 DIAGNOSIS — Z76.89 ENCOUNTER TO ESTABLISH CARE WITH NEW DOCTOR: ICD-10-CM

## 2018-04-19 PROBLEM — R59.1 LYMPHADENOPATHY: Status: RESOLVED | Noted: 2018-02-28 | Resolved: 2018-04-19

## 2018-04-19 PROBLEM — K76.0 FATTY LIVER: Status: RESOLVED | Noted: 2018-03-01 | Resolved: 2018-04-19

## 2018-04-19 PROBLEM — J18.9 PNEUMONIA: Status: RESOLVED | Noted: 2018-03-01 | Resolved: 2018-04-19

## 2018-04-19 PROCEDURE — 99214 OFFICE O/P EST MOD 30 MIN: CPT | Performed by: FAMILY MEDICINE

## 2018-04-19 RX ORDER — PAROXETINE 10 MG/1
10 TABLET, FILM COATED ORAL DAILY
Qty: 90 TAB | Refills: 0 | Status: SHIPPED | OUTPATIENT
Start: 2018-04-19 | End: 2018-07-21 | Stop reason: SDUPTHER

## 2018-04-19 ASSESSMENT — PATIENT HEALTH QUESTIONNAIRE - PHQ9: CLINICAL INTERPRETATION OF PHQ2 SCORE: 0

## 2018-04-19 NOTE — ASSESSMENT & PLAN NOTE
Chronic, stable, well controlled on current medication.    ROS is NEGATIVE for dizziness, generalized weakness/fatigue, cold sweats, dizziness,  vision/hearing changes, jaw pain/paresthesias, BUE pain/paresthesias/numbness/weakness, chest pain/pressure, palpitations, dyspnea, nausea, RUQ abdominal pain, oliguria/anuria, BLE edema.

## 2018-04-19 NOTE — ASSESSMENT & PLAN NOTE
Patient was recently hospitalized in [facility] for sigmoid diverticulitis and pneumonia, both of which were responsive to inpatient antibiotic therapy. Patient was discharged home, and currently states that he is feeling well.    Patient believes last colonoscopy was more than 10 years ago, therefore he is appropriate for referral. Additionally, patient reports history of Pema-Brasher tear. Patient believes that he was drinking excessive amounts of alcohol when this is pretty diagnosed, however hasn't had endoscopy and at least 5 years.    ROS is NEGATIVE for generalized weakness/fatigue, generalized pallor, dizziness, lightheadedness, blurred vision, chest pain/pressure, palpitations, tachycardia, dyspnea, hematemesis, hemoptysis, diarrhea, hematochezia, melena, hematuria, hand and foot tingling.

## 2018-04-24 NOTE — ASSESSMENT & PLAN NOTE
Patient and I discussed recent labs (see below; mixed dyslipidemia, uncontrolled) and that ASCVD risk is increased based on most recent lipid panel, current blood pressure (hypertensive, with medication), diabetes status (non-diabetic), and smoking status (non-smoker).    Patient and I then discussed necessary dietary changes to make to address dyslipidemia.  Patient is currently taking cholesterol lowering medication: simvastatin, fenofibrate.  Patient verbalized understanding.    ROS is NEGATIVE for dizziness, generalized weakness/fatigue, vision/hearing changes, jaw pain/paresthesias, BUE pain/paresthesias/numbness/weakness, chest pain/pressure, palpitations, dyspnea, RUQ abdominal pain, oliguria/anuria, BLE edema.    Lab Results   Component Value Date/Time    CHOLSTRLTOT 153 03/01/2018 05:00 AM    LDL 82 03/01/2018 05:00 AM    HDL 29 (A) 03/01/2018 05:00 AM    TRIGLYCERIDE 209 (H) 03/01/2018 05:00 AM

## 2018-04-24 NOTE — ASSESSMENT & PLAN NOTE
Chronic, stable, well-controlled on Paroxetine.    ROS is NEGATIVE for suicidal and homicidal ideation, also negative for auditory and visual hallucinations.

## 2018-04-24 NOTE — ASSESSMENT & PLAN NOTE
Chronic, stable, well-controlled per symptoms, on beta blocker, and patient is on Aspirin.  We will obtain previous cardiology records.

## 2018-04-24 NOTE — PROGRESS NOTES
Subjective:   Chief Complaint/History of Present Illness:  Papo Souza is a 72 y.o. male established patient who presents today to establish primary medical care with me, and also to discuss medical problems as listed below    Diagnoses of Encounter to establish care with new doctor, Essential hypertension, Diverticulitis of large intestine without perforation or abscess without bleeding, Pema-Brasher tear, Hyperlipidemia, unspecified hyperlipidemia type, Atrial fibrillation with RVR (CMS-HCC), and Mild episode of recurrent major depressive disorder (CMS-[facility]) were pertinent to this visit.    Essential hypertension  Chronic, stable, well controlled on current medication.    ROS is NEGATIVE for dizziness, generalized weakness/fatigue, cold sweats, dizziness,  vision/hearing changes, jaw pain/paresthesias, BUE pain/paresthesias/numbness/weakness, chest pain/pressure, palpitations, dyspnea, nausea, RUQ abdominal pain, oliguria/anuria, BLE edema.     Diverticulitis  Patient was recently hospitalized in [facility] for sigmoid diverticulitis and pneumonia, both of which were responsive to inpatient antibiotic therapy. Patient was discharged home, and currently states that he is feeling well.    Patient believes last colonoscopy was more than 10 years ago, therefore he is appropriate for referral. Additionally, patient reports history of Pema-Brasher tear. Patient believes that he was drinking excessive amounts of alcohol when this is pretty diagnosed, however hasn't had endoscopy and at least 5 years.    ROS is NEGATIVE for generalized weakness/fatigue, generalized pallor, dizziness, lightheadedness, blurred vision, chest pain/pressure, palpitations, tachycardia, dyspnea, hematemesis, hemoptysis, diarrhea, hematochezia, melena, hematuria, hand and foot tingling.    Pema-Brasher tear  Chronic, unknown control.  Please see notes from same date of service 04/19/18 re: diverticulitis.    HLD (hyperlipidemia)  Patient  and I discussed recent labs (see below; mixed dyslipidemia, uncontrolled) and that ASCVD risk is increased based on most recent lipid panel, current blood pressure (hypertensive, with medication), diabetes status (non-diabetic), and smoking status (non-smoker).    Patient and I then discussed necessary dietary changes to make to address dyslipidemia.  Patient is currently taking cholesterol lowering medication: simvastatin, fenofibrate.  Patient verbalized understanding.    ROS is NEGATIVE for dizziness, generalized weakness/fatigue, vision/hearing changes, jaw pain/paresthesias, BUE pain/paresthesias/numbness/weakness, chest pain/pressure, palpitations, dyspnea, RUQ abdominal pain, oliguria/anuria, BLE edema.    Lab Results   Component Value Date/Time    CHOLSTRLTOT 153 03/01/2018 05:00 AM    LDL 82 03/01/2018 05:00 AM    HDL 29 (A) 03/01/2018 05:00 AM    TRIGLYCERIDE 209 (H) 03/01/2018 05:00 AM       Atrial fibrillation with RVR  Chronic, stable, well-controlled per symptoms, on beta blocker, and patient is on Aspirin.  We will obtain previous cardiology records.    Depression  Chronic, stable, well-controlled on Paroxetine.    ROS is NEGATIVE for suicidal and homicidal ideation, also negative for auditory and visual hallucinations.      Patient Active Problem List    Diagnosis Date Noted   • Diverticulitis 02/28/2018     Priority: High   • History of cocaine use 10/12/2013     Priority: High   • Atrial fibrillation with RVR (CMS-[facility]) 10/12/2013     Priority: High   • HLD (hyperlipidemia) 02/28/2018     Priority: Low   • Depression 02/28/2018     Priority: Low   • Essential hypertension 04/19/2018   • Pema-Brasher tear 10/15/2013   • History of left inguinal hernia 03/26/2013       Additional History:   Allergies:    Amoxicillin     Current Medications:     Current Outpatient Prescriptions   Medication Sig Dispense Refill   • PARoxetine (PAXIL) 10 MG Tab Take 1 Tab by mouth every day. 90 Tab 0   • aspirin (ASA)  "325 MG Tab Take 162.5 mg by mouth every day.     • fenofibrate (TRIGLIDE) 160 MG tablet Take 160 mg by mouth every day.     • metoprolol SR (TOPROL XL) 50 MG TB24 Take 50 mg by mouth every evening.     • simvastatin (ZOCOR) 10 MG TABS Take 10 mg by mouth every day.       No current facility-administered medications for this visit.         Social History:     Social History   Substance Use Topics   • Smoking status: Former Smoker   • Smokeless tobacco: Never Used   • Alcohol use Yes      Comment: Occasionally       ROS:     - NOTE: All other systems reviewed and are negative, except as in HPI.     Objective:   Physical Exam:    Vitals: Blood pressure 122/70, pulse 64, temperature 36.2 °C (97.2 °F), height 1.778 m (5' 10\"), weight 84.8 kg (187 lb), SpO2 96 %.   BMI: Body mass index is 26.83 kg/m².   General/Constitutional: Vitals as above, Well nourished, well developed male in no acute distress   Head/Eyes: Head is grossly normal & atraumatic, bilateral conjunctivae clear and not injected, bilateral EOMI, bilateral PERRL   ENT: Bilateral external ears grossly normal in appearance, Hearing grossly intact, External nares normal in appearance and without discharge/bleeding   Respiratory: No respiratory distress, bilateral lungs are clear to ausculation in all lung fields (anterior/lateral/posterior), no wheezing/rhonchi/rales   Cardiovascular: Regular rate and rhythm without murmur/gallops/rubs, distal pulses are intact and equal bilaterally (radial, posterior tibial), no bilateral lower extremity edema   Gastrointestinal: Abdomen resonant to percussion, Bowel sounds present in all 4 quadrants, abdomen non-tender to light and deep palpation   MSK: Gait grossly normal & not antalgic   Integumentary: No apparent rashes   Psych: Judgment grossly appropriate, no apparent depression/anxiety    Health Maintenance:     - I have requested previous records (Dr. Mejia tyrell), and will update accordingly.    Imaging/Labs:     - I " have requested previous records (Dr. Roberto Kumar), and will update accordingly.    Assessment and Plan:   1. Encounter to establish care with new doctor  Patient transferring primary medical care to me from Dr. Roberto Kumar   - LIPID PROFILE; Future    2. Essential hypertension  Chronic, stable, well-controlled, continue current medication.    3. Diverticulitis of large intestine without perforation or abscess without bleeding  New problem, uncontrolled, requires continued GI follow-up.     - REFERRAL TO GASTROENTEROLOGY    4. Pema-Brasher tear  Chronic problem, unknown control, referral to GI for follow-up.   - REFERRAL TO GASTROENTEROLOGY    5. Hyperlipidemia, unspecified hyperlipidemia type  Chronic, uncontrolled.     A) Lifestyle changes: Patient and I discussed the importance of lifestyle changes, with particular emphasis on plant-based nutrition (for the purposes of weight loss, general health, HLD), as well as cardiovascular exercise, proper sleep, and stress management.  This discussion is briefly summarized in the patient instruction section below.  Patient verbalized understanding.   B) Medication: Continue medications as listed.   C) Evaluation: Recheck Lipid profile in 3-6months    - LIPID PROFILE; Future    6. Atrial fibrillation with RVR (CMS-HCC)  Chronic, unknown control, request previous cardiology records.  Continue B-Blocker and Aspirin.    7. Mild episode of recurrent major depressive disorder (CMS-HCC)  Chronic, stable, well-controlled.  Continue Paroxetine.   - PARoxetine (PAXIL) 10 MG Tab; Take 1 Tab by mouth every day.  Dispense: 90 Tab; Refill: 0    RTC: in 3 months for depression management, follow-up of records, review labs.    PLEASE NOTE: This dictation was created using voice recognition software. I have made every reasonable attempt to correct obvious errors, but I expect that there are errors of grammar and possibly content that I did not discover before finalizing the note.

## 2018-04-24 NOTE — ASSESSMENT & PLAN NOTE
Chronic, unknown control.  Please see notes from same date of service 04/19/18 re: diverticulitis.

## 2018-06-01 PROBLEM — K57.30 DIVERTICULOSIS OF LARGE INTESTINE WITHOUT HEMORRHAGE: Status: ACTIVE | Noted: 2018-02-28

## 2018-06-01 PROBLEM — D12.6 TUBULAR ADENOMA OF COLON: Status: ACTIVE | Noted: 2018-06-01

## 2018-07-12 ENCOUNTER — TELEPHONE (OUTPATIENT)
Dept: MEDICAL GROUP | Facility: MEDICAL CENTER | Age: 73
End: 2018-07-12

## 2018-07-12 NOTE — TELEPHONE ENCOUNTER
ESTABLISHED PATIENT PRE-VISIT PLANNING     Note: Patient will not be contacted if there is no indication to call.     1.  Reviewed notes from the last few office visits within the medical group: Yes/04/19/2018    2.  If any orders were placed at last visit or intended to be done for this visit (i.e. 6 mos follow-up), do we have Results/Consult Notes?        •  Labs - Pt does not specify when labs need to be comp.    Note: If patient appointment is for lab review and patient did not complete labs, check with provider if OK to reschedule patient until labs completed.       •  Imaging - Imaging was not ordered at last office visit.       •  Referrals - No referrals were ordered at last office visit.    3. Is this appointment scheduled as a Hospital Follow-Up? No    4.  Immunizations were updated in [employer] using WebIZ?: Yes       •  Web Iz Recommendations: FLU, TDAP and ZOSTAVAX (Shingles)    5.  Patient is due for the following Health Maintenance Topics:   Health Maintenance Due   Topic Date Due   • Annual Wellness Visit  1945   • IMM DTaP/Tdap/Td Vaccine (1 - Tdap) 09/10/1966         6.  MDX printed for Provider? NO    7.  Patient was NOT informed to arrive 15 min prior to their scheduled appointment and bring in their medication bottles.

## 2018-07-19 ENCOUNTER — OFFICE VISIT (OUTPATIENT)
Dept: MEDICAL GROUP | Facility: MEDICAL CENTER | Age: 73
End: 2018-07-19
Payer: MEDICARE

## 2018-07-19 VITALS
TEMPERATURE: 98 F | HEIGHT: 70 IN | BODY MASS INDEX: 27.92 KG/M2 | WEIGHT: 195 LBS | OXYGEN SATURATION: 95 % | DIASTOLIC BLOOD PRESSURE: 64 MMHG | HEART RATE: 77 BPM | SYSTOLIC BLOOD PRESSURE: 110 MMHG

## 2018-07-19 DIAGNOSIS — F33.0 MILD EPISODE OF RECURRENT MAJOR DEPRESSIVE DISORDER (HCC): ICD-10-CM

## 2018-07-19 DIAGNOSIS — D12.6 TUBULAR ADENOMA OF COLON: ICD-10-CM

## 2018-07-19 DIAGNOSIS — K22.719 BARRETT'S ESOPHAGUS WITH DYSPLASIA: ICD-10-CM

## 2018-07-19 PROCEDURE — 99214 OFFICE O/P EST MOD 30 MIN: CPT | Performed by: FAMILY MEDICINE

## 2018-07-21 DIAGNOSIS — F33.0 MILD EPISODE OF RECURRENT MAJOR DEPRESSIVE DISORDER (HCC): ICD-10-CM

## 2018-07-22 NOTE — PROGRESS NOTES
Subjective:   Chief Complaint/History of Present Illness:  Papo Souza is a 72 y.o. male established patient who presents today to discuss medical problems as listed below    Diagnoses of Mild episode of recurrent major depressive disorder (HCC), Tubular adenoma of colon, and Burgess's esophagus with dysplasia were pertinent to this visit.    Depression  Chronic, stable, well-controlled, taking medication as directed, at a dose of Paxil 10mg PO Qday, down from his previous dose of 20mg PO Qday.  Patient has not noticed any adverse symptoms (I.e. Uncontrolled depressed mood, mood changes, et cetera) since decreasing his medication.    ROS is NEGATIVE for suicidal or homicidal ideation, also negative for auditory or visual hallucinations.    No s/sx of serotonin syndrome: Agitation or restlessness, Confusion, Rapid heart rate and high blood pressure, Dilated pupils, Loss of muscle coordination or twitching muscles, Muscle rigidity, Heavy sweating, Diarrhea, Headache, Shivering, Goose bumps, High fever, Seizures, Irregular heartbeat, Unconsciousness.     Tubular adenoma of colon  New problem, uncontrolled, patient advised that he needs to have closer follow-up with gastroenterologist based on this pathology discovered at his colonoscopy on 05/25/18.    ROS is NEGATIVE for nausea, emesis, abdominal pain, hematochezia, melena, diarrhea, constipation.    Burgess's esophagus with dysplasia  Chronic, uncontrolled, recently discovered on EGD performed in 05/2018.  We are awaiting pathology results from the esophageal biopsy.    Patient made aware that Pema Brasher tears and Burgess's esophagus can be marked by epigastric abdominal pain, and can be treatable, depending on severity.    ROS is NEGATIVE for fevers, chills, nausea, emesis, hematemesis, hematochezia, melena, diarrhea, constipation.      Patient Active Problem List    Diagnosis Date Noted   • Diverticulosis of large intestine without hemorrhage 02/28/2018  "    Priority: High   • History of cocaine use 10/12/2013     Priority: High   • Atrial fibrillation with RVR (HCC) 10/12/2013     Priority: High   • HLD (hyperlipidemia) 02/28/2018     Priority: Low   • Depression 02/28/2018     Priority: Low   • Tubular adenoma of colon 06/01/2018   • Essential hypertension 04/19/2018   • Burgess's esophagus with dysplasia 10/15/2013   • History of left inguinal hernia 03/26/2013       Additional History:   Allergies:    Amoxicillin     Current Medications:     Current Outpatient Prescriptions   Medication Sig Dispense Refill   • PARoxetine (PAXIL) 10 MG Tab Take 1 Tab by mouth every day. 90 Tab 0   • simvastatin (ZOCOR) 10 MG Tab TAKE 1 TABLET BY MOUTH EVERY DAY 90 Tab 2   • aspirin (ASA) 325 MG Tab Take 162.5 mg by mouth every day.     • fenofibrate (TRIGLIDE) 160 MG tablet Take 160 mg by mouth every day.     • metoprolol SR (TOPROL XL) 50 MG TB24 Take 50 mg by mouth every evening.       No current facility-administered medications for this visit.         Social History:     Social History   Substance Use Topics   • Smoking status: Former Smoker   • Smokeless tobacco: Never Used   • Alcohol use Yes      Comment: Occasionally       ROS:     - NOTE: All other systems reviewed and are negative, except as in HPI.     Objective:   Physical Exam:    Vitals: Blood pressure 110/64, pulse 77, temperature 36.7 °C (98 °F), height 1.778 m (5' 10\"), weight 88.5 kg (195 lb), SpO2 95 %.   BMI: Body mass index is 27.98 kg/m².   General/Constitutional: Vitals as above, Well nourished, well developed male in no acute distress   Head/Eyes: Head is grossly normal & atraumatic, bilateral conjunctivae clear and not injected, bilateral EOMI, bilateral PERRL   ENT: Bilateral external ears grossly normal in appearance, Hearing grossly intact, External nares normal in appearance and without discharge/bleeding   Respiratory: No respiratory distress, bilateral lungs are clear to ausculation in all lung " fields (anterior/lateral/posterior), no wheezing/rhonchi/rales   Cardiovascular: Regular rate and rhythm without murmur/gallops/rubs, distal pulses are intact and equal bilaterally (radial, posterior tibial), no bilateral lower extremity edema   MSK: Gait grossly normal & not antalgic   Integumentary: No apparent rashes   Psych: Judgment grossly appropriate, no apparent depression/anxiety    Health Maintenance:     - 05/25/18 -- Colonoscopy -- Pan-diverticulosis + multiple tubular adenoma    Imaging/Labs:     - 07/17/18 -- Tchol 185, HDL 37, ,     Assessment and Plan:   1. Mild episode of recurrent major depressive disorder (HCC)  Chronic, stable, well-controlled on Paxil 10mg PO Qday    2. Tubular adenoma of colon  New problem, uncontrolled, patient needs to have repeat colonoscopy in 08/2018 due to suboptimal bowel preparation.    3. Burgess's esophagus with dysplasia  Chronic, uncontrolled, patient advised to decrease triggers for GERD-type symptoms, to have moderate alcohol intake.      RTC: in 2months for Medicare Annual Wellness Visits.    PLEASE NOTE: This dictation was created using voice recognition software. I have made every reasonable attempt to correct obvious errors, but I expect that there are errors of grammar and possibly content that I did not discover before finalizing the note.

## 2018-07-22 NOTE — ASSESSMENT & PLAN NOTE
New problem, uncontrolled, patient advised that he needs to have closer follow-up with gastroenterologist based on this pathology discovered at his colonoscopy on 05/25/18.    ROS is NEGATIVE for nausea, emesis, abdominal pain, hematochezia, melena, diarrhea, constipation.

## 2018-07-22 NOTE — ASSESSMENT & PLAN NOTE
Chronic, stable, well-controlled, taking medication as directed, at a dose of Paxil 10mg PO Qday, down from his previous dose of 20mg PO Qday.  Patient has not noticed any adverse symptoms (I.e. Uncontrolled depressed mood, mood changes, et cetera) since decreasing his medication.    ROS is NEGATIVE for suicidal or homicidal ideation, also negative for auditory or visual hallucinations.    No s/sx of serotonin syndrome: Agitation or restlessness, Confusion, Rapid heart rate and high blood pressure, Dilated pupils, Loss of muscle coordination or twitching muscles, Muscle rigidity, Heavy sweating, Diarrhea, Headache, Shivering, Goose bumps, High fever, Seizures, Irregular heartbeat, Unconsciousness.

## 2018-07-22 NOTE — ASSESSMENT & PLAN NOTE
Chronic, uncontrolled, recently discovered on EGD performed in 05/2018.  We are awaiting pathology results from the esophageal biopsy.    Patient made aware that Pema Brasher tears and Burgess's esophagus can be marked by epigastric abdominal pain, and can be treatable, depending on severity.    ROS is NEGATIVE for fevers, chills, nausea, emesis, hematemesis, hematochezia, melena, diarrhea, constipation.

## 2018-07-23 RX ORDER — PAROXETINE 10 MG/1
10 TABLET, FILM COATED ORAL DAILY
Qty: 90 TAB | Refills: 0 | Status: SHIPPED | OUTPATIENT
Start: 2018-07-23 | End: 2018-10-01

## 2018-07-23 NOTE — TELEPHONE ENCOUNTER
Was the patient seen in the last year in this department? Yes     Does patient have an active prescription for medications requested? No     Received Request Via: Pharmacy   No

## 2018-07-24 LAB
CHOLEST SERPL-MCNC: 185 MG/DL (ref 100–199)
HDLC SERPL-MCNC: 37 MG/DL
LABORATORY COMMENT REPORT: ABNORMAL
LDLC SERPL CALC-MCNC: 115 MG/DL (ref 0–99)
TRIGL SERPL-MCNC: 165 MG/DL (ref 0–149)
VLDLC SERPL CALC-MCNC: 33 MG/DL (ref 5–40)

## 2018-08-13 RX ORDER — FENOFIBRATE 160 MG/1
TABLET ORAL
Qty: 90 TAB | Refills: 3 | Status: SHIPPED | OUTPATIENT
Start: 2018-08-13 | End: 2019-08-05 | Stop reason: SDUPTHER

## 2018-09-04 ENCOUNTER — TELEPHONE (OUTPATIENT)
Dept: MEDICAL GROUP | Facility: MEDICAL CENTER | Age: 73
End: 2018-09-04

## 2018-09-04 NOTE — TELEPHONE ENCOUNTER
Left message for patient to call back regarding pre-visit planning. Please transfer call to [phone].

## 2018-09-04 NOTE — TELEPHONE ENCOUNTER
Phone Number Called: [phone] (home)       Message: Called patient for AWV PVP  was not available. Wife states patient is scheduled for colonoscopy next Monday at [facility] and they are needing referral to proceed with procedure. Please advise.         Left Message for patient to call back: yes

## 2018-09-04 NOTE — TELEPHONE ENCOUNTER
Future Appointments       Provider Department Center    9/11/2018 10:40 AM Smooth Tsang M.D.; [facility]        ANNUAL WELLNESS VISIT PRE-VISIT PLANNING WITHOUT OUTREACH    1.  Reviewed note from last office visit with PCP: YES    3.  Immunizations were updated in Epic using WebIZ?: Yes       •  WebIZ Recommendations: FLU, TDAP and SHINGRIX (Shingles)        •  Is patient due for Tdap? YES. Patient was not notified of copay/out of pocket cost.       •  Is patient due for Shingles? YES. Patient was not notified of copay/out of pocket cost.     4.  Patient is due for the following Health Maintenance Topics:   Health Maintenance Due   Topic Date Due   • Annual Wellness Visit  1945   • IMM DTaP/Tdap/Td Vaccine (1 - Tdap) 09/10/1966   • IMM ZOSTER VACCINES (1 of 2) 09/10/1997   • IMM INFLUENZA (1) 09/01/2018         5.  Reviewed/Updated the following with patient:       •   Preferred Pharmacy? NO       •   Preferred Lab? NO       •   Preferred Communication? NO       •   Allergies? NO       •   Medications? NO       •   Social History? NO       •   Family History (document living status of immediate family members and if + hx of  cancer, diabetes, hypertension, hyperlipidemia, heart attack, stroke) NO    6.  Care Team Updated:       •   DME Company (gait device, O2, CPAP, etc.): N\A       •   Other Specialists (eye doctor, derm, GYN, cardiology, endo, etc): N\A    7.  Patient has the following Care Path diagnoses on Problem List:  DEPRESSION      8.  MDX printed and highlighted for Provider? NO    9.  Patient was advised: “This is a free wellness visit. The provider will screen for medical conditions to help you stay healthy. If you have other concerns to address you may be asked to discuss these at a separate visit or there may be an additional fee.”     10.  Patient was informed to arrive 15 min prior to their scheduled appointment and  bring in their medication bottles.

## 2018-09-05 NOTE — TELEPHONE ENCOUNTER
Rigo, the referral has already been in place since 04/2018.  Please look at the referral section, print out the referral, then fax it to [facility].

## 2018-09-06 ENCOUNTER — TELEPHONE (OUTPATIENT)
Dept: MEDICAL GROUP | Facility: MEDICAL CENTER | Age: 73
End: 2018-09-06

## 2018-09-06 NOTE — TELEPHONE ENCOUNTER
Left message for patient to call back regarding pre-visit planning. Please transfer call to [phone].

## 2018-09-07 ENCOUNTER — APPOINTMENT (OUTPATIENT)
Dept: ADMISSIONS | Facility: MEDICAL CENTER | Age: 73
End: 2018-09-07
Attending: INTERNAL MEDICINE
Payer: MEDICARE

## 2018-09-10 ENCOUNTER — HOSPITAL ENCOUNTER (OUTPATIENT)
Facility: MEDICAL CENTER | Age: 73
End: 2018-09-10
Attending: INTERNAL MEDICINE | Admitting: INTERNAL MEDICINE
Payer: MEDICARE

## 2018-09-10 VITALS
OXYGEN SATURATION: 99 % | WEIGHT: 190.92 LBS | RESPIRATION RATE: 16 BRPM | BODY MASS INDEX: 27.33 KG/M2 | HEART RATE: 55 BPM | SYSTOLIC BLOOD PRESSURE: 128 MMHG | TEMPERATURE: 96.9 F | DIASTOLIC BLOOD PRESSURE: 75 MMHG | HEIGHT: 70 IN

## 2018-09-10 PROCEDURE — 160204 HCHG ENDO MINUTES - 1ST 30 MINS LEVEL 5: Performed by: INTERNAL MEDICINE

## 2018-09-10 PROCEDURE — 160009 HCHG ANES TIME/MIN: Performed by: INTERNAL MEDICINE

## 2018-09-10 PROCEDURE — 88305 TISSUE EXAM BY PATHOLOGIST: CPT | Mod: 59

## 2018-09-10 PROCEDURE — 160209 HCHG ENDO MINUTES - EA ADDL 1 MIN LEVEL 5: Performed by: INTERNAL MEDICINE

## 2018-09-10 PROCEDURE — 160035 HCHG PACU - 1ST 60 MINS PHASE I: Performed by: INTERNAL MEDICINE

## 2018-09-10 PROCEDURE — 501629 HCHG TUBE, LUKI TRAP STERILE DISP: Performed by: INTERNAL MEDICINE

## 2018-09-10 PROCEDURE — 93010 ELECTROCARDIOGRAM REPORT: CPT | Performed by: INTERNAL MEDICINE

## 2018-09-10 PROCEDURE — 160002 HCHG RECOVERY MINUTES (STAT): Performed by: INTERNAL MEDICINE

## 2018-09-10 PROCEDURE — 93005 ELECTROCARDIOGRAM TRACING: CPT | Performed by: INTERNAL MEDICINE

## 2018-09-10 PROCEDURE — 700111 HCHG RX REV CODE 636 W/ 250 OVERRIDE (IP)

## 2018-09-10 PROCEDURE — 160048 HCHG OR STATISTICAL LEVEL 1-5: Performed by: INTERNAL MEDICINE

## 2018-09-10 PROCEDURE — 160036 HCHG PACU - EA ADDL 30 MINS PHASE I: Performed by: INTERNAL MEDICINE

## 2018-09-10 RX ORDER — SODIUM CHLORIDE, SODIUM LACTATE, POTASSIUM CHLORIDE, CALCIUM CHLORIDE 600; 310; 30; 20 MG/100ML; MG/100ML; MG/100ML; MG/100ML
INJECTION, SOLUTION INTRAVENOUS CONTINUOUS
Status: DISCONTINUED | OUTPATIENT
Start: 2018-09-10 | End: 2018-09-10 | Stop reason: HOSPADM

## 2018-09-10 RX ORDER — LIDOCAINE HYDROCHLORIDE 10 MG/ML
0.5 INJECTION, SOLUTION INFILTRATION; PERINEURAL
Status: DISCONTINUED | OUTPATIENT
Start: 2018-09-10 | End: 2018-09-10 | Stop reason: HOSPADM

## 2018-09-10 RX ADMIN — SODIUM CHLORIDE, SODIUM LACTATE, POTASSIUM CHLORIDE, CALCIUM CHLORIDE 1000 ML: 600; 310; 30; 20 INJECTION, SOLUTION INTRAVENOUS at 12:02

## 2018-09-10 ASSESSMENT — PAIN SCALES - GENERAL
PAINLEVEL_OUTOF10: 0

## 2018-09-10 NOTE — OR NURSING
1327- Pt arrives from OR and report received.      1338- Pt awake, taking sips of water, denies pain or nausea, placed on room air.    1340- Wife brought to bedside.    1345- Report to Estefany.    1430- Pt and wife provided with d/c paperwork and instructions by Kym CARLTON.  No scripts.  IV removed.  Pt ambulated out with steady gait.

## 2018-09-10 NOTE — OP REPORT
DATE OF SERVICE:  09/10/2018    INDICATION FOR PROCEDURE:  Known colonic polyps.    CONSENT:  Informed consent was obtained directly from the patient after   benefits, risks and possible alternatives were discussed.    MEDICATIONS:  The patient received propofol-based regimen from Dr. Solitario from   anesthesia, please see his notes for full details.    PROCEDURE DESCRIPTION:  The patient was placed in the left lateral decubitus   position and provided with supplemental oxygen via a face mask.  When ready, a   digital rectal examination was performed, which was normal.  The colonoscope   was inserted into the rectum and advanced carefully in the usual manner to the   cecum, which was identified by the presence of the ileocecal valve, and   appendiceal orifice.  Photodocumentation of these structures were obtained.    FINDINGS:  There were polyps present throughout the colon, as had previously   been identified.  All visualized polyps were removed by either hot or cold   snare, and retrieved.  Polyps size was between 3 and 8 mm throughout.    Additionally, there was some diverticulosis, which was scattered throughout   the colon, and some internal hemorrhoids seen on retroflex view of the rectum.    Otherwise, colonoscopy was normal.  The scope was then withdrawn after   excess air was removed from the colonic lumen.    COMPLICATIONS:  No complications during or in the immediate postoperative   period.    IMPRESSION:  1.  Multiple polyps as described above throughout the colon, all removed   completely, and retrieved.  2.  Scattered diverticulosis throughout the colon.  3.  Internal hemorrhoids.  4.  Otherwise, normal colonoscopy to the cecum.    RECOMMENDATION:  Would be to avoid any kind of aspirin or anti-inflammatories   x2 weeks.  The patient will be discharged home when he meets post-anesthesia   criteria.  He will have diet advanced as tolerated.  His next colonoscopy   would be recommended in 1 year.        ____________________________________     MD ANNETTE ARRIAGA    DD:  09/10/2018 13:34:23  DT:  09/10/2018 14:05:52    D#:  [id number]  Job#:  [id number]

## 2018-09-10 NOTE — OR SURGEON
Immediate Post OP Note    PreOp Diagnosis: known colon polyps    PostOp Diagnosis: Multple polyps throughout colon, from 3-7 mm.   All removed completely by either hot or cold snare and retrieved.    Procedure(s):  COLONOSCOPY - Wound Class: Clean Contaminated    Surgeon(s):  Migel Cordero M.D.    Anesthesiologist/Type of Anesthesia:  Anesthesiologist: Michael Solitario M.D./General    Surgical Staff:  Circulator: Migel Mckinney R.N.  Endoscopy Technician: Mony Post    Specimens removed if any:  Multiple colon polyps    Estimated Blood Loss: none    Findings: as above.  Diverticulosis diffuse.    Complications: none    Plan:  OK for d/c home.  Next colonoscopy 1 year.        9/10/2018 1:30 PM Migel Cordero M.D.

## 2018-09-10 NOTE — DISCHARGE INSTRUCTIONS
COLONOSCOPY OR FLEXIBLE SIGMOIDOSCOPY  1. If you received a barium enema, take a mild laxative such as dulcolax to clean out the barium.   2. Drink plenty of fluids. Eat a diet high in fiber; such foods as whole-grain breads, fresh fruit and vegetables, nuts are recommended.  3. You may notice a few drops of blood with your first bowel movement. If you develop any large amount of bleeding, black stools, a fever, or abdominal pain, call your doctor right away.   4. Call your doctor for test results in 14 days.  5. Don't drive or drink alcohol for 24 hours. The medication you received will make you too drowsy.   6. No heavy lifting, ASA products or ASA x 14 days   7. Additional instructions:    Advance diet as tolerated.   Please call office in 2 weeks for office for polyp results.  Ask for Callie or Fifi.          You should call [phone] if you develop problems with breathing or chest pain.    Nurse's Signature: ___________________________________    I acknowledge receipt and understanding of these Home Care instructions.

## 2018-09-11 ENCOUNTER — OFFICE VISIT (OUTPATIENT)
Dept: MEDICAL GROUP | Facility: MEDICAL CENTER | Age: 73
End: 2018-09-11
Payer: MEDICARE

## 2018-09-11 VITALS
RESPIRATION RATE: 16 BRPM | HEIGHT: 70 IN | SYSTOLIC BLOOD PRESSURE: 110 MMHG | DIASTOLIC BLOOD PRESSURE: 62 MMHG | HEART RATE: 68 BPM | OXYGEN SATURATION: 96 % | WEIGHT: 192.02 LBS | BODY MASS INDEX: 27.49 KG/M2 | TEMPERATURE: 97.9 F

## 2018-09-11 DIAGNOSIS — Z23 NEED FOR SHINGLES VACCINE: ICD-10-CM

## 2018-09-11 DIAGNOSIS — K57.30 DIVERTICULOSIS OF LARGE INTESTINE WITHOUT HEMORRHAGE: ICD-10-CM

## 2018-09-11 DIAGNOSIS — E78.5 HYPERLIPIDEMIA, UNSPECIFIED HYPERLIPIDEMIA TYPE: ICD-10-CM

## 2018-09-11 DIAGNOSIS — I48.91 ATRIAL FIBRILLATION WITH RVR (HCC): ICD-10-CM

## 2018-09-11 DIAGNOSIS — Z00.00 MEDICARE ANNUAL WELLNESS VISIT, SUBSEQUENT: Primary | ICD-10-CM

## 2018-09-11 DIAGNOSIS — K22.719 BARRETT'S ESOPHAGUS WITH DYSPLASIA: ICD-10-CM

## 2018-09-11 DIAGNOSIS — Z23 NEED FOR INFLUENZA VACCINATION: ICD-10-CM

## 2018-09-11 DIAGNOSIS — M75.22 BICEPS TENDINITIS OF LEFT UPPER EXTREMITY: ICD-10-CM

## 2018-09-11 DIAGNOSIS — D12.6 TUBULAR ADENOMA OF COLON: ICD-10-CM

## 2018-09-11 DIAGNOSIS — F33.0 MILD EPISODE OF RECURRENT MAJOR DEPRESSIVE DISORDER (HCC): ICD-10-CM

## 2018-09-11 DIAGNOSIS — I10 ESSENTIAL HYPERTENSION: ICD-10-CM

## 2018-09-11 LAB — EKG IMPRESSION: NORMAL

## 2018-09-11 PROCEDURE — G0439 PPPS, SUBSEQ VISIT: HCPCS | Mod: 25 | Performed by: FAMILY MEDICINE

## 2018-09-11 PROCEDURE — G0008 ADMIN INFLUENZA VIRUS VAC: HCPCS | Performed by: FAMILY MEDICINE

## 2018-09-11 PROCEDURE — 90662 IIV NO PRSV INCREASED AG IM: CPT | Performed by: FAMILY MEDICINE

## 2018-09-11 RX ORDER — METHOCARBAMOL 500 MG/1
500-1000 TABLET, FILM COATED ORAL 3 TIMES DAILY PRN
Qty: 90 TAB | Refills: 0 | Status: SHIPPED | OUTPATIENT
Start: 2018-09-11 | End: 2019-03-29

## 2018-09-11 RX ORDER — DICLOFENAC SODIUM 75 MG/1
75 TABLET, DELAYED RELEASE ORAL 2 TIMES DAILY
Qty: 60 TAB | Refills: 0 | Status: SHIPPED | OUTPATIENT
Start: 2018-09-11 | End: 2019-03-29

## 2018-09-11 ASSESSMENT — ENCOUNTER SYMPTOMS: GENERAL WELL-BEING: EXCELLENT

## 2018-09-11 ASSESSMENT — PATIENT HEALTH QUESTIONNAIRE - PHQ9: CLINICAL INTERPRETATION OF PHQ2 SCORE: 0

## 2018-09-11 ASSESSMENT — ACTIVITIES OF DAILY LIVING (ADL): BATHING_REQUIRES_ASSISTANCE: 0

## 2018-09-11 NOTE — PROGRESS NOTES
Chief Complaint   Patient presents with   • Annual Exam         HPI:  Papo is a 73 y.o. here for Medicare Annual Wellness Visit    Tubular adenoma of colon  Chroinc, uncontrolled, patient made aware of the implications of having positive tubular adenoma.  Verbalized understanding.    (HCC) Atrial fibrillation with RVR  This problem is chronic, stable, and monitored appropriately by history/labs/imaging as needed, and is well-controlled on the current regimen.  Please continue current regimen    Essential hypertension  This problem is chronic, stable, and monitored appropriately by history/labs/imaging as needed, and is well-controlled on the current regimen.  Please continue current regimen    HLD (hyperlipidemia)  Patient and I discussed recent labs (see below; mixed HLD, uncontrolled) and that ASCVD risk is increased based on most recent lipid panel, current blood pressure (hypertensive, with medication), diabetes status (non-diabetic), and smoking status (non-smoker).    Patient and I then discussed necessary dietary changes to make to address dyslipidemia.  Patient is currently taking cholesterol lowering medication: simvastatin.  Patient verbalized understanding.    ROS is NEGATIVE for dizziness, generalized weakness/fatigue, vision/hearing changes, jaw pain/paresthesias, BUE pain/paresthesias/numbness/weakness, chest pain/pressure, palpitations, dyspnea, RUQ abdominal pain, oliguria/anuria, BLE edema.    Lab Results   Component Value Date/Time    CHOLSTRLTOT 185 07/17/2018 08:59 AM    CHOLSTRLTOT 153 03/01/2018 05:00 AM     (H) 07/17/2018 08:59 AM    LDL 82 03/01/2018 05:00 AM    HDL 37 (L) 07/17/2018 08:59 AM    HDL 29 (A) 03/01/2018 05:00 AM    TRIGLYCERIDE 165 (H) 07/17/2018 08:59 AM    TRIGLYCERIDE 209 (H) 03/01/2018 05:00 AM       (HCC) Mild episode of recurrent major depressive disorder  This problem is chronic, stable, and monitored appropriately by history/labs/imaging as needed, and is  well-controlled on the current regimen.  Please continue current regimen    Biceps tendinitis of left upper extremity  New problem, uncontrolled, patient with L shoulder achiness and reduced ability to use this extremity.    ROS is NEGATIVE for LUE radicular pain/numbness/weaknees, L  deficiency.    Diverticulosis of large intestine without hemorrhage  This problem is chronic, stable, and monitored appropriately by history/labs/imaging as needed, and is well-controlled on the current regimen.  Please continue current regimen    Burgess's esophagus with dysplasia  This problem is chronic, stable, and monitored appropriately by history/labs/imaging as needed, and is well-controlled on the current regimen.  Please continue current regimen      Patient Active Problem List    Diagnosis Date Noted   • Diverticulosis of large intestine without hemorrhage 02/28/2018     Priority: High   • H/O cocaine use 10/12/2013     Priority: High   • (HCC) Atrial fibrillation with RVR 10/12/2013     Priority: High   • HLD (hyperlipidemia) 02/28/2018     Priority: Low   • (HCC) Mild episode of recurrent major depressive disorder 02/28/2018     Priority: Low   • Biceps tendinitis of left upper extremity 09/14/2018   • Tubular adenoma of colon 06/01/2018   • Essential hypertension 04/19/2018   • Burgess's esophagus with dysplasia 10/15/2013   • H/O left inguinal hernia 03/26/2013       Current Outpatient Prescriptions   Medication Sig Dispense Refill   • methocarbamol (ROBAXIN) 500 MG Tab Take 1-2 Tabs by mouth 3 times a day as needed. 90 Tab 0   • diclofenac EC (VOLTAREN) 75 MG Tablet Delayed Response Take 1 Tab by mouth 2 times a day. 60 Tab 0   • fenofibrate (TRIGLIDE) 160 MG tablet TAKE 1 TABLET BY MOUTH AT BEDTIME (Patient taking differently: TAKE 1 TABLET BY MOUTH Daily) 90 Tab 3   • PARoxetine (PAXIL) 10 MG Tab TAKE 1 TAB BY MOUTH EVERY DAY. 90 Tab 0   • simvastatin (ZOCOR) 10 MG Tab TAKE 1 TABLET BY MOUTH EVERY DAY 90 Tab 2   •  metoprolol SR (TOPROL XL) 50 MG TB24 Take 50 mg by mouth every day.     • aspirin (ASA) 325 MG Tab Take 162.5 mg by mouth every day.       No current facility-administered medications for this visit.         Patient is taking medications as noted in medication list.  Current supplements as per medication list.     Allergies: Amoxicillin    Current social contact/activities: Golf, Taoism, volunteer.     Is patient current with immunizations? No, due for FLU, TDAP and SHINGRIX (Shingles). Patient is interested in receiving FLU today.    He  reports that he has quit smoking. He has never used smokeless tobacco. He reports that he drinks alcohol. He reports that he does not use drugs.  Counseling given: Not Answered        DPA/Advanced directive: Patient does not have an Advanced Directive.  A packet and workshop information was given on Advanced Directives.    ROS:    Gait: Uses no assistive device   Ostomy: No   Other tubes: No   Amputations: No   Chronic oxygen use No   Last eye exam 5 months ago    Wears hearing aids: No   : Denies any urinary leakage during the last 6 months          Depression Screening    Little interest or pleasure in doing things?  0 - not at all  Feeling down, depressed, or hopeless? 0 - not at all  Patient Health Questionnaire Score: 0    If depressive symptoms identified deferred to follow up visit unless specifically addressed in assessment and plan.    Interpretation of PHQ-9 Total Score   Score Severity   1-4 No Depression   5-9 Mild Depression   10-14 Moderate Depression   15-19 Moderately Severe Depression   20-27 Severe Depression    Screening for Cognitive Impairment    Three Minute Recall (leader, season, table)  3/3 (leader, season, table)  Damaso clock face with all 12 numbers and set the hands to show 10 past 11.  Yes 5/5  If cognitive concerns identified, deferred for follow up unless specifically addressed in assessment and plan.    Fall Risk Assessment    Has the patient had  two or more falls in the last year or any fall with injury in the last year?  No  If fall risk identified, deferred for follow up unless specifically addressed in assessment and plan.    Safety Assessment    Throw rugs on floor.  No  Handrails on all stairs.  No  Good lighting in all hallways.  Yes  Difficulty hearing.  No  Patient counseled about all safety risks that were identified.    Functional Assessment ADLs    Are there any barriers preventing you from cooking for yourself or meeting nutritional needs?  No.    Are there any barriers preventing you from driving safely or obtaining transportation?  No.    Are there any barriers preventing you from using a telephone or calling for help?  No.    Are there any barriers preventing you from shopping?  No.    Are there any barriers preventing you from taking care of your own finances?  No.    Are there any barriers preventing you from managing your medications?  No.    Are there any barriers preventing you from showering, bathing or dressing yourself?  No.    Are you currently engaging in any exercise or physical activity?  Yes.  Plays golf 2 x week  What is your perception of your health?  Excellent.    Health Maintenance Summary                Annual Wellness Visit Overdue 1945     IMM DTaP/Tdap/Td Vaccine Overdue 9/10/1966     IMM ZOSTER VACCINES Overdue 9/10/1997     IMM INFLUENZA Overdue 9/1/2018      Done 11/7/2017 Imm Admin: Influenza Vaccine Pediatric Split     Patient has more history with this topic...    IMM PNEUMOCOCCAL 65+ (ADULT) LOW/MEDIUM RISK SERIES Next Due 2/28/2019      Done 2/28/2018 Imm Admin: Pneumococcal Conjugate Vaccine (Prevnar/PCV-13)    COLONOSCOPY Next Due 9/10/2023      Done 9/10/2018 Surg:COLONOSCOPY     Patient has more history with this topic...          Patient Care Team:  Smooth Tsang M.D. as PCP - General (Family Medicine)  Migel Cordero M.D. as Consulting Physician (Gastroenterology)    Social History   Substance Use  "Topics   • Smoking status: Former Smoker   • Smokeless tobacco: Never Used   • Alcohol use Yes      Comment: Occasionally     History reviewed. No pertinent family history.  He  has a past medical history of Arrhythmia (2011); Heart burn; High cholesterol; Pneumonia (2018); and Renal disorder (2011).   Past Surgical History:   Procedure Laterality Date   • COLONOSCOPY N/A 9/10/2018    Procedure: COLONOSCOPY;  Surgeon: Migel Cordero M.D.;  Location: SURGERY SAME DAY [facility];  Service: Gastroenterology   • GASTROSCOPY-ENDO  10/13/2013    Performed by Oseas Madden M.D. at ENDOSCOPY [facility]   • INGUINAL HERNIA REPAIR  3/26/2013    Performed by Felipe Carter M.D. at SURGERY SAME DAY [facility]   • OTHER  2011    dialysis cath insertion and removal   • COLONOSCOPY             Exam:     Blood pressure 110/62, pulse 68, temperature 36.6 °C (97.9 °F), resp. rate 16, height 1.778 m (5' 10\"), weight 87.1 kg (192 lb 0.3 oz), SpO2 96 %. Body mass index is 27.55 kg/m².    Hearing excellent.    Dentition good  Alert, oriented in no acute distress.  Eye contact is good, speech goal directed, affect calm  LUE lateral and short head tendon insertional tenderness on palpation with internal & external rotation of humerus.    07/24/18 -- elevated LDL (bad) cholesterol, low HDL (good) cholesterol, elevated triglycerides    Assessment and Plan. The following treatment and monitoring plan is recommended:    1. Medicare annual wellness visit, subsequent  [place] ANNUAL WELLNESS VISIT-INCLUDES PPPS SUBSEQUE*   2. Need for influenza vaccination  INFLUENZA VACCINE, HIGH DOSE (65+ ONLY)    [place] ANNUAL WELLNESS VISIT-INCLUDES PPPS SUBSEQUE*   3. Biceps tendinitis of left upper extremity  methocarbamol (ROBAXIN) 500 MG Tab    diclofenac EC (VOLTAREN) 75 MG Tablet Delayed Response   4. Tubular adenoma of colon  [place] ANNUAL WELLNESS VISIT-INCLUDES PPPS SUBSEQUE*   5. (HCC) Atrial fibrillation with RVR  [place] ANNUAL WELLNESS " VISIT-INCLUDES PPPS SUBSEQUE*   6. Essential hypertension  [place] ANNUAL WELLNESS VISIT-INCLUDES PPPS SUBSEQUE*   7. Hyperlipidemia, unspecified hyperlipidemia type  [place] ANNUAL WELLNESS VISIT-INCLUDES PPPS SUBSEQUE*   8. Mild episode of recurrent major depressive disorder (HCC)  [place] ANNUAL WELLNESS VISIT-INCLUDES PPPS SUBSEQUE*   9. Diverticulosis of large intestine without hemorrhage     10. Burgess's esophagus with dysplasia  [place] ANNUAL WELLNESS VISIT-INCLUDES PPPS SUBSEQUE*   11. Need for shingles vaccine  Zoster Vac Recomb Adjuvanted (SHINGRIX) 50 MCG Recon Susp    [place] ANNUAL WELLNESS VISIT-INCLUDES PPPS SUBSEQUE*     Muscle relaxant + NSAIDS for LUE biceps tendinitis    Services suggested: No services needed at this time  Health Care Screening recommendations as per orders if indicated.  Referrals offered: PT/OT/Nutrition counseling/Behavioral Health/Smoking cessation as per orders if indicated.    Discussion today about general wellness and lifestyle habits:    · Prevent falls and reduce trip hazards; Cautioned about securing or removing rugs.  · Have a working fire alarm and carbon monoxide detector;   · Engage in regular physical activity and social activities       Follow-up: No Follow-up on file.

## 2018-09-14 PROBLEM — F33.0 MILD EPISODE OF RECURRENT MAJOR DEPRESSIVE DISORDER (HCC): Status: ACTIVE | Noted: 2018-02-28

## 2018-09-14 PROBLEM — M75.22 BICEPS TENDINITIS OF LEFT UPPER EXTREMITY: Status: ACTIVE | Noted: 2018-09-14

## 2018-09-14 NOTE — ASSESSMENT & PLAN NOTE
Chroinc, uncontrolled, patient made aware of the implications of having positive tubular adenoma.  Verbalized understanding.

## 2018-09-14 NOTE — ASSESSMENT & PLAN NOTE
New problem, uncontrolled, patient with L shoulder achiness and reduced ability to use this extremity.    ROS is NEGATIVE for LUE radicular pain/numbness/weaknees, L  deficiency.

## 2018-09-14 NOTE — ASSESSMENT & PLAN NOTE
Patient and I discussed recent labs (see below; mixed HLD, uncontrolled) and that ASCVD risk is increased based on most recent lipid panel, current blood pressure (hypertensive, with medication), diabetes status (non-diabetic), and smoking status (non-smoker).    Patient and I then discussed necessary dietary changes to make to address dyslipidemia.  Patient is currently taking cholesterol lowering medication: simvastatin.  Patient verbalized understanding.    ROS is NEGATIVE for dizziness, generalized weakness/fatigue, vision/hearing changes, jaw pain/paresthesias, BUE pain/paresthesias/numbness/weakness, chest pain/pressure, palpitations, dyspnea, RUQ abdominal pain, oliguria/anuria, BLE edema.    Lab Results   Component Value Date/Time    CHOLSTRLTOT 185 07/17/2018 08:59 AM    CHOLSTRLTOT 153 03/01/2018 05:00 AM     (H) 07/17/2018 08:59 AM    LDL 82 03/01/2018 05:00 AM    HDL 37 (L) 07/17/2018 08:59 AM    HDL 29 (A) 03/01/2018 05:00 AM    TRIGLYCERIDE 165 (H) 07/17/2018 08:59 AM    TRIGLYCERIDE 209 (H) 03/01/2018 05:00 AM

## 2018-09-29 DIAGNOSIS — F33.0 MILD EPISODE OF RECURRENT MAJOR DEPRESSIVE DISORDER (HCC): ICD-10-CM

## 2018-10-01 ENCOUNTER — APPOINTMENT (OUTPATIENT)
Dept: MEDICAL GROUP | Facility: MEDICAL CENTER | Age: 73
End: 2018-10-01
Payer: MEDICARE

## 2018-10-01 RX ORDER — PAROXETINE 10 MG/1
10 TABLET, FILM COATED ORAL DAILY
Qty: 90 TAB | Refills: 1 | Status: SHIPPED | OUTPATIENT
Start: 2018-10-01 | End: 2018-11-05

## 2018-10-03 ENCOUNTER — OFFICE VISIT (OUTPATIENT)
Dept: MEDICAL GROUP | Facility: MEDICAL CENTER | Age: 73
End: 2018-10-03
Payer: MEDICARE

## 2018-10-03 ENCOUNTER — HOSPITAL ENCOUNTER (OUTPATIENT)
Dept: RADIOLOGY | Facility: MEDICAL CENTER | Age: 73
End: 2018-10-03
Attending: NURSE PRACTITIONER
Payer: MEDICARE

## 2018-10-03 VITALS
WEIGHT: 191 LBS | BODY MASS INDEX: 27.35 KG/M2 | SYSTOLIC BLOOD PRESSURE: 108 MMHG | OXYGEN SATURATION: 97 % | HEART RATE: 66 BPM | HEIGHT: 70 IN | DIASTOLIC BLOOD PRESSURE: 64 MMHG | RESPIRATION RATE: 16 BRPM | TEMPERATURE: 97.8 F

## 2018-10-03 DIAGNOSIS — M25.512 ACUTE PAIN OF LEFT SHOULDER: ICD-10-CM

## 2018-10-03 PROCEDURE — 73030 X-RAY EXAM OF SHOULDER: CPT | Mod: LT

## 2018-10-03 PROCEDURE — 99214 OFFICE O/P EST MOD 30 MIN: CPT | Performed by: NURSE PRACTITIONER

## 2018-10-03 NOTE — PROGRESS NOTES
Subjective:     Chief Complaint   Patient presents with   • Shoulder Pain     LEFT SIDE     Papo Souza is a 73 y.o. male established patient of Dr. Tsang here for evaluation of left shoulder pain.  This started about a month ago with insidious onset, he denies any history of injury.  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  He began having pain in the lateral shoulder with radiation down the arm at times.  This is aggravated by lying on his left side at night or by overhead movement, slight relief with rest although pain does not completely resolve.  Rated as 7 out of 10 at times.  He had seen Dr. Tsang on September 11, was prescribed diclofenac and methocarbamol.  Unfortunately he does not feel this is helping.  He presents today specifically requesting joint injection, states that he had this many years ago in the same shoulder and had substantial benefit.  He has no recent imaging.  Denies joint swelling or redness, numbness, tingling in the arm.  No change in strength    No problem-specific Assessment & Plan notes found for this encounter.       Current medicines (including changes today)  Current Outpatient Prescriptions   Medication Sig Dispense Refill   • PARoxetine (PAXIL) 10 MG Tab TAKE 1 TAB BY MOUTH EVERY DAY. 90 Tab 1   • methocarbamol (ROBAXIN) 500 MG Tab Take 1-2 Tabs by mouth 3 times a day as needed. 90 Tab 0   • diclofenac EC (VOLTAREN) 75 MG Tablet Delayed Response Take 1 Tab by mouth 2 times a day. 60 Tab 0   • fenofibrate (TRIGLIDE) 160 MG tablet TAKE 1 TABLET BY MOUTH AT BEDTIME (Patient taking differently: TAKE 1 TABLET BY MOUTH Daily) 90 Tab 3   • simvastatin (ZOCOR) 10 MG Tab TAKE 1 TABLET BY MOUTH EVERY DAY 90 Tab 2   • aspirin (ASA) 325 MG Tab Take 162.5 mg by mouth every day.     • metoprolol SR (TOPROL XL) 50 MG TB24 Take 50 mg by mouth every day.       No current facility-administered medications for this visit.   "    He  has a past medical history of Arrhythmia (2011); Heart burn; High cholesterol; Pneumonia (2018); and Renal disorder (2011).    ROS included above     Objective:     Blood pressure 108/64, pulse 66, temperature 36.6 °C (97.8 °F), temperature source Temporal, resp. rate 16, height 1.778 m (5' 10\"), weight 86.6 kg (191 lb), SpO2 97 %. Body mass index is 27.41 kg/m².     Physical Exam:  General: Alert, oriented in no acute distress.  Eye contact is good, speech is normal, affect calm  Lungs: clear to auscultation bilaterally, normal effort, no wheeze/ rhonchi/ rales.  CV: regular rate and rhythm, S1, S2  MS: No point tenderness over the anterior posterior glenohumeral joint, no joint swelling or redness.  Normal passive range of motion.  Negative drop arm, negative empty can.  Strength is 5 out of 5.  Positive impingement sign  Ext: no edema, color normal, vascularity normal, temperature normal    Procedure:  The patient's shoulder was prepped with iodine and then was injected with the posterior subacromial bursa approach using a combination of 1 cc 40 mg Kenalog and 5 cc xylocaine 1% wihtout epi using. Patient tolerated procedure well.          Assessment and Plan:   The following treatment plan was discussed   1. Acute pain of left shoulder  >1 month persistent pain not improved with diclofenac and methocarbamol. ? Bursitis vs. Bicep tendonitis.  X-ray obtained prior to procedure, as detailed below.  Impression       1.  No acute fracture is identified.    2.  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         glenohumeral joint injection as detailed above, well-tolerated.  Patient to contact me if no improvement over the next week at which point I would refer him to an orthopedic       Followup: as needed         Please note that this dictation was created using voice recognition software. I have worked with consultants from the vendor as well as technical experts from [employer] to " optimize the interface. I have made every reasonable attempt to correct obvious errors, but I expect that there are errors of grammar and possibly content that I did not discover before finalizing the note.

## 2018-10-23 ENCOUNTER — TELEPHONE (OUTPATIENT)
Dept: MEDICAL GROUP | Facility: MEDICAL CENTER | Age: 73
End: 2018-10-23

## 2018-10-23 NOTE — TELEPHONE ENCOUNTER
[facility]  Is contacting us for an advanced refill authorization on C-Nitroglycerin 0.12% ointment

## 2018-10-24 NOTE — TELEPHONE ENCOUNTER
I've never prescribed this medication for this patient.  He has a visit with me next week on 10/31.  I'll discuss this with him at that time and write him a prescription then, if necessary.

## 2018-10-31 ENCOUNTER — OFFICE VISIT (OUTPATIENT)
Dept: MEDICAL GROUP | Facility: MEDICAL CENTER | Age: 73
End: 2018-10-31
Payer: MEDICARE

## 2018-10-31 VITALS
HEART RATE: 66 BPM | SYSTOLIC BLOOD PRESSURE: 100 MMHG | HEIGHT: 70 IN | TEMPERATURE: 97.8 F | DIASTOLIC BLOOD PRESSURE: 60 MMHG | OXYGEN SATURATION: 94 % | BODY MASS INDEX: 27.26 KG/M2 | WEIGHT: 190.4 LBS

## 2018-10-31 DIAGNOSIS — Z00.00 MEDICARE ANNUAL WELLNESS VISIT, SUBSEQUENT: ICD-10-CM

## 2018-10-31 DIAGNOSIS — M19.012 ARTHRITIS OF LEFT SHOULDER REGION: ICD-10-CM

## 2018-10-31 DIAGNOSIS — N50.89 SCROTAL SWELLING: ICD-10-CM

## 2018-10-31 DIAGNOSIS — I10 ESSENTIAL HYPERTENSION: ICD-10-CM

## 2018-10-31 DIAGNOSIS — E78.5 HYPERLIPIDEMIA, UNSPECIFIED HYPERLIPIDEMIA TYPE: ICD-10-CM

## 2018-10-31 PROBLEM — E78.2 MIXED DYSLIPIDEMIA: Status: ACTIVE | Noted: 2018-10-31

## 2018-10-31 PROCEDURE — 99214 OFFICE O/P EST MOD 30 MIN: CPT | Mod: 25 | Performed by: FAMILY MEDICINE

## 2018-10-31 PROCEDURE — 96372 THER/PROPH/DIAG INJ SC/IM: CPT | Performed by: FAMILY MEDICINE

## 2018-10-31 RX ORDER — CEFTRIAXONE SODIUM 250 MG/1
250 INJECTION, POWDER, FOR SOLUTION INTRAMUSCULAR; INTRAVENOUS ONCE
Status: DISCONTINUED | OUTPATIENT
Start: 2018-10-31 | End: 2018-10-31

## 2018-10-31 RX ORDER — DOXYCYCLINE HYCLATE 100 MG
100 TABLET ORAL 2 TIMES DAILY
Qty: 14 TAB | Refills: 0 | Status: SHIPPED | OUTPATIENT
Start: 2018-10-31 | End: 2018-11-07

## 2018-11-02 NOTE — ASSESSMENT & PLAN NOTE
Patient and I discussed recent labs (see below; mixed dyslipidemia, uncontrolled) and that ASCVD risk is increased based on most recent lipid panel, current blood pressure (hypertensive, with medication), diabetes status (non-diabetic), and smoking status (non-smoker).    Patient and I then discussed necessary dietary changes to make to address dyslipidemia.  Patient is currently taking cholesterol lowering medication: Simvastatin.  Patient verbalized understanding.    ROS is NEGATIVE for dizziness, generalized weakness/fatigue, vision/hearing changes, jaw pain/paresthesias, BUE pain/paresthesias/numbness/weakness, chest pain/pressure, palpitations, dyspnea, RUQ abdominal pain, oliguria/anuria, BLE edema.    Lab Results   Component Value Date/Time    CHOLSTRLTOT 185 07/17/2018 08:59 AM    CHOLSTRLTOT 153 03/01/2018 05:00 AM     (H) 07/17/2018 08:59 AM    LDL 82 03/01/2018 05:00 AM    HDL 37 (L) 07/17/2018 08:59 AM    HDL 29 (A) 03/01/2018 05:00 AM    TRIGLYCERIDE 165 (H) 07/17/2018 08:59 AM    TRIGLYCERIDE 209 (H) 03/01/2018 05:00 AM

## 2018-11-02 NOTE — ASSESSMENT & PLAN NOTE
Chronic, stable, well-controlled at present time.  Patient states that he had evaluation by orthopedic surgeon, Dr. Cecily Holliday, of left shoulder arthritis and was administered a steroid shot to the left shoulder which was not ultrasound-guided.    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  I discussed that the mechanism of injury could therefore be humeral head irritation against the glenohumeral joint, therefore causing him to have temporary arthritic/inflammatory changes.      We reviewed the images of his left shoulder, and I pointed out the above pathology.    ROS is negative for left upper extremity radicular pain/numbness/weakness,  strength weakness.

## 2018-11-02 NOTE — ASSESSMENT & PLAN NOTE
This is a new problem for medical evaluation, uncontrolled, states that he has noticed a fluid swelling of his right testicle.  This swelling is not associated with pain, nor any urinary symptoms.  Patient has not been sexually active over the last few months.  Patient denies direct trauma or repetitive trauma (e.g. Bouncing while riding a horse, riding bicycles or motorized 2-wheel vehicles).      We discussed differential diagnosis, that this could be hydrocele, spermatocele, varicocele, or epidydmitis, as well as remote chance of it being testicular cancer.  We also discussed     ROS is NEGATIVE for fevers, chills, pelvic pain, genital rash/pruritus, penile discharge/bleeding, dysuria, pyuria, hematuria, polyuria, increased frequency of urination.

## 2018-11-02 NOTE — PROGRESS NOTES
Subjective:   Chief Complaint/History of Present Illness:  Papo Souza is a 73 y.o. male established patient who presents today to discuss medical problems as listed below    Diagnoses of Scrotal swelling, Arthritis of left shoulder region, Essential hypertension, Hyperlipidemia, unspecified hyperlipidemia type, and Medicare annual wellness visit, subsequent were pertinent to this visit.    Scrotal swelling  This is a new problem for medical evaluation, uncontrolled, states that he has noticed a fluid swelling of his right testicle.  This swelling is not associated with pain, nor any urinary symptoms.  Patient has not been sexually active over the last few months.  Patient denies direct trauma or repetitive trauma (e.g. Bouncing while riding a horse, riding bicycles or motorized 2-wheel vehicles).      We discussed differential diagnosis, that this could be hydrocele, spermatocele, varicocele, or epidydmitis, as well as remote chance of it being testicular cancer.  We also discussed     ROS is NEGATIVE for fevers, chills, pelvic pain, genital rash/pruritus, penile discharge/bleeding, dysuria, pyuria, hematuria, polyuria, increased frequency of urination.    Arthritis of left shoulder region  Chronic, stable, well-controlled at present time.  Patient states that he had evaluation by orthopedic surgeon, Dr. Cecily Holliday, of left shoulder arthritis and was administered a steroid shot to the left shoulder which was not ultrasound-guided.    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  I discussed that the mechanism of injury could therefore be humeral head irritation against the glenohumeral joint, therefore causing him to have temporary arthritic/inflammatory changes.      We reviewed the images of his left shoulder, and I pointed out the above pathology.    ROS is negative for left upper extremity radicular pain/numbness/weakness,  strength  weakness.    Essential hypertension  Chronic, stable, well-controlled, taking medication as directed.     ROS is NEGATIVE for dizziness, generalized weakness/fatigue, cold sweats,  vision/hearing changes, jaw pain/paresthesias, BUE pain/paresthesias/numbness/weakness, chest pain/pressure, palpitations, dyspnea, nausea, RUQ abdominal pain, oliguria/anuria, BLE edema.    HLD (hyperlipidemia)  Patient and I discussed recent labs (see below; mixed dyslipidemia, uncontrolled) and that ASCVD risk is increased based on most recent lipid panel, current blood pressure (hypertensive, with medication), diabetes status (non-diabetic), and smoking status (non-smoker).    Patient and I then discussed necessary dietary changes to make to address dyslipidemia.  Patient is currently taking cholesterol lowering medication: Simvastatin.  Patient verbalized understanding.    ROS is NEGATIVE for dizziness, generalized weakness/fatigue, vision/hearing changes, jaw pain/paresthesias, BUE pain/paresthesias/numbness/weakness, chest pain/pressure, palpitations, dyspnea, RUQ abdominal pain, oliguria/anuria, BLE edema.    Lab Results   Component Value Date/Time    CHOLSTRLTOT 185 07/17/2018 08:59 AM    CHOLSTRLTOT 153 03/01/2018 05:00 AM     (H) 07/17/2018 08:59 AM    LDL 82 03/01/2018 05:00 AM    HDL 37 (L) 07/17/2018 08:59 AM    HDL 29 (A) 03/01/2018 05:00 AM    TRIGLYCERIDE 165 (H) 07/17/2018 08:59 AM    TRIGLYCERIDE 209 (H) 03/01/2018 05:00 AM          Patient Active Problem List    Diagnosis Date Noted   • Diverticulosis of large intestine without hemorrhage 02/28/2018     Priority: High   • H/O cocaine use 10/12/2013     Priority: High   • (HCC) Atrial fibrillation with RVR 10/12/2013     Priority: High   • HLD (hyperlipidemia) 02/28/2018     Priority: Low   • (HCC) Mild episode of recurrent major depressive disorder 02/28/2018     Priority: Low   • Scrotal swelling 10/31/2018   • Arthritis of left shoulder region 09/14/2018   •  "Tubular adenoma of colon 06/01/2018   • Essential hypertension 04/19/2018   • Burgess's esophagus with dysplasia 10/15/2013   • H/O left inguinal hernia 03/26/2013       Additional History:   Allergies:    Amoxicillin     Current Medications:     Current Outpatient Prescriptions   Medication Sig Dispense Refill   • doxycycline (VIBRAMYCIN) 100 MG Tab Take 1 Tab by mouth 2 times a day for 7 days. 14 Tab 0   • PARoxetine (PAXIL) 10 MG Tab TAKE 1 TAB BY MOUTH EVERY DAY. 90 Tab 1   • methocarbamol (ROBAXIN) 500 MG Tab Take 1-2 Tabs by mouth 3 times a day as needed. 90 Tab 0   • diclofenac EC (VOLTAREN) 75 MG Tablet Delayed Response Take 1 Tab by mouth 2 times a day. 60 Tab 0   • fenofibrate (TRIGLIDE) 160 MG tablet TAKE 1 TABLET BY MOUTH AT BEDTIME (Patient taking differently: TAKE 1 TABLET BY MOUTH Daily) 90 Tab 3   • simvastatin (ZOCOR) 10 MG Tab TAKE 1 TABLET BY MOUTH EVERY DAY 90 Tab 2   • aspirin (ASA) 325 MG Tab Take 162.5 mg by mouth every day.     • metoprolol SR (TOPROL XL) 50 MG TB24 Take 50 mg by mouth every day.       No current facility-administered medications for this visit.         Social History:     Social History   Substance Use Topics   • Smoking status: Former Smoker   • Smokeless tobacco: Never Used   • Alcohol use Yes      Comment: Occasionally       ROS:     - NOTE: All other systems reviewed and are negative, except as in HPI.     Objective:   Physical Exam:    Vitals: Blood pressure 100/60, pulse 66, temperature 36.6 °C (97.8 °F), height 1.778 m (5' 10\"), weight 86.4 kg (190 lb 6.4 oz), SpO2 94 %.   BMI: Body mass index is 27.32 kg/m².   General/Constitutional: Vitals as above, Well nourished, well developed male in no acute distress   Head/Eyes: Head is grossly normal & atraumatic, bilateral conjunctivae clear and not injected, bilateral EOMI, bilateral PERRL   ENT: Bilateral external ears grossly normal in appearance, Hearing grossly intact, External nares normal in appearance and without " discharge/bleeding   Respiratory: No respiratory distress, bilateral lungs are clear to ausculation in all lung fields (anterior/lateral/posterior), no wheezing/rhonchi/rales   Cardiovascular: Regular rate and rhythm without murmur/gallops/rubs, distal pulses are intact and equal bilaterally (radial, posterior tibial), no bilateral lower extremity edema   : Patient with palpable fluid edema/swelling of right side of scrotal sac that does not easily transilluminate, but is nontender, without testicular tenderness palpation on either side, and mild palpable varicocele on the left, however difficult to fully palpate right testicle.   MSK: Gait grossly normal & not antalgic   Integumentary: No apparent rashes   Psych: Judgment grossly appropriate, no apparent depression/anxiety    Health Maintenance:     - Patient has received flu shot recently    Imaging/Labs:     - No new labs to review    Assessment and Plan:   1. Scrotal swelling  New problem for medical evaluation, uncontrolled.  Treat empirically with antibiotics considering that epididymitis could be the underlying cause.  Patient can thereafter pursue ultrasound if swelling does not resolve.   - [id number]; Future   - doxycycline (VIBRAMYCIN) 100 MG Tab; Take 1 Tab by mouth 2 times a day for 7 days.  Dispense: 14 Tab; Refill: 0   - cefTRIAXone (ROCEPHIN) 250 mg, lidocaine (XYLOCAINE) 1 % 0.9 mL for IM use; 250 mg by Intramuscular route Once.    2. Arthritis of left shoulder region  Chronic, stable, well-controlled.    - COMP METABOLIC PANEL; Future    3. Essential hypertension  Chronic, stable, well-controlled.  Continue taking medication as directed.    - LIPID PROFILE; Future   - COMP METABOLIC PANEL; Future   - MICROALB/CREAT RATIO RAND. UR    4. Hyperlipidemia, unspecified hyperlipidemia type  Chronic, uncontrolled, patient advised to pursue lifestyle changes, particularly cardiovascular exercise and increasing proportion of plant-based  nutrition.   - LIPID PROFILE; Future    5. Medicare annual wellness visit, subsequent   - LIPID PROFILE; Future   - COMP METABOLIC PANEL; Future   - MICROALB/CREAT RATIO RAND. UR        RTC: in 03/2019 for Labs Follow-up.    PLEASE NOTE: This dictation was created using voice recognition software. I have made every reasonable attempt to correct obvious errors, but I expect that there are errors of grammar and possibly content that I did not discover before finalizing the note.

## 2018-11-02 NOTE — ASSESSMENT & PLAN NOTE
Chronic, stable, well-controlled, taking medication as directed.     ROS is NEGATIVE for dizziness, generalized weakness/fatigue, cold sweats,  vision/hearing changes, jaw pain/paresthesias, BUE pain/paresthesias/numbness/weakness, chest pain/pressure, palpitations, dyspnea, nausea, RUQ abdominal pain, oliguria/anuria, BLE edema.

## 2018-11-05 DIAGNOSIS — F33.0 MILD EPISODE OF RECURRENT MAJOR DEPRESSIVE DISORDER (HCC): ICD-10-CM

## 2018-11-05 RX ORDER — PAROXETINE 10 MG/1
TABLET, FILM COATED ORAL
Qty: 90 TAB | Refills: 0 | Status: SHIPPED | OUTPATIENT
Start: 2018-11-05 | End: 2019-02-06 | Stop reason: SDUPTHER

## 2018-11-09 ENCOUNTER — EH NON-PROVIDER (OUTPATIENT)
Dept: OCCUPATIONAL MEDICINE | Facility: CLINIC | Age: 73
End: 2018-11-09

## 2018-11-09 ENCOUNTER — HOSPITAL ENCOUNTER (OUTPATIENT)
Facility: MEDICAL CENTER | Age: 73
End: 2018-11-09
Attending: PREVENTIVE MEDICINE
Payer: COMMERCIAL

## 2018-11-09 DIAGNOSIS — Z02.89 ENCOUNTER FOR OCCUPATIONAL HEALTH EXAMINATION: ICD-10-CM

## 2018-11-09 PROCEDURE — 86765 RUBEOLA ANTIBODY: CPT | Performed by: PREVENTIVE MEDICINE

## 2018-11-09 PROCEDURE — 86480 TB TEST CELL IMMUN MEASURE: CPT | Performed by: PREVENTIVE MEDICINE

## 2018-11-09 PROCEDURE — 90715 TDAP VACCINE 7 YRS/> IM: CPT | Performed by: PREVENTIVE MEDICINE

## 2018-11-09 PROCEDURE — 86787 VARICELLA-ZOSTER ANTIBODY: CPT | Performed by: PREVENTIVE MEDICINE

## 2018-11-09 PROCEDURE — 86762 RUBELLA ANTIBODY: CPT | Performed by: PREVENTIVE MEDICINE

## 2018-11-09 PROCEDURE — 86735 MUMPS ANTIBODY: CPT | Performed by: PREVENTIVE MEDICINE

## 2018-11-10 LAB — RUBV AB SER QL: >500 IU/ML

## 2018-11-11 LAB
M TB TUBERC IFN-G BLD QL: NEGATIVE
M TB TUBERC IFN-G/MITOGEN IGNF BLD: 0.02
M TB TUBERC IGNF/MITOGEN IGNF CONTROL: 8.72 [IU]/ML
MITOGEN IGNF BCKGRD COR BLD-ACNC: 0.07 [IU]/ML

## 2018-11-12 LAB
MEV IGG SER IA-ACNC: 3.33
MUV IGG SER IA-ACNC: 0.6
VZV IGG SER IA-ACNC: 2.84

## 2018-11-15 ENCOUNTER — HOSPITAL ENCOUNTER (OUTPATIENT)
Dept: RADIOLOGY | Facility: MEDICAL CENTER | Age: 73
End: 2018-11-15
Attending: FAMILY MEDICINE
Payer: MEDICARE

## 2018-11-15 DIAGNOSIS — N50.89 SCROTAL SWELLING: ICD-10-CM

## 2018-11-15 PROCEDURE — 76870 US EXAM SCROTUM: CPT

## 2018-11-26 ENCOUNTER — EH NON-PROVIDER (OUTPATIENT)
Dept: OCCUPATIONAL MEDICINE | Facility: CLINIC | Age: 73
End: 2018-11-26

## 2018-11-26 DIAGNOSIS — Z71.85 IMMUNIZATION COUNSELING: ICD-10-CM

## 2018-11-26 NOTE — PROGRESS NOTES
Volunteer clearance reviewed and signed. Quantiferon lab result documented in immunizations. Document returned to monthly assigned MA.

## 2018-11-30 ENCOUNTER — OFFICE VISIT (OUTPATIENT)
Dept: MEDICAL GROUP | Facility: MEDICAL CENTER | Age: 73
End: 2018-11-30
Payer: MEDICARE

## 2018-11-30 VITALS
BODY MASS INDEX: 26.94 KG/M2 | WEIGHT: 188.2 LBS | SYSTOLIC BLOOD PRESSURE: 106 MMHG | HEIGHT: 70 IN | OXYGEN SATURATION: 95 % | DIASTOLIC BLOOD PRESSURE: 70 MMHG | TEMPERATURE: 97.2 F | HEART RATE: 67 BPM

## 2018-11-30 DIAGNOSIS — N43.3 RIGHT HYDROCELE: ICD-10-CM

## 2018-11-30 DIAGNOSIS — N50.82 SCROTAL PAIN: ICD-10-CM

## 2018-11-30 PROCEDURE — 99213 OFFICE O/P EST LOW 20 MIN: CPT | Performed by: PHYSICIAN ASSISTANT

## 2018-11-30 NOTE — PROGRESS NOTES
"Subjective:   Papo Souza is a 73 y.o. male here today for scrotal pain secondary to a large right hydrocele.    Scrotal pain  This is a 73-year-old male who complains of a history of at least couple months of right scrotal swelling and pain.  Discomfort.  Ultrasound performed showed a large right hydrocele.  Also bilateral spermatoceles with the left side being worse than the right.  It was advised by his PCP to come in to get a referral to see urology.       Current medicines (including changes today)  Current Outpatient Prescriptions   Medication Sig Dispense Refill   • PARoxetine (PAXIL) 10 MG Tab TAKE 1 TABLET BY MOUTH EVERY DAY 90 Tab 0   • methocarbamol (ROBAXIN) 500 MG Tab Take 1-2 Tabs by mouth 3 times a day as needed. 90 Tab 0   • diclofenac EC (VOLTAREN) 75 MG Tablet Delayed Response Take 1 Tab by mouth 2 times a day. 60 Tab 0   • fenofibrate (TRIGLIDE) 160 MG tablet TAKE 1 TABLET BY MOUTH AT BEDTIME (Patient taking differently: TAKE 1 TABLET BY MOUTH Daily) 90 Tab 3   • simvastatin (ZOCOR) 10 MG Tab TAKE 1 TABLET BY MOUTH EVERY DAY 90 Tab 2   • aspirin (ASA) 325 MG Tab Take 162.5 mg by mouth every day.     • metoprolol SR (TOPROL XL) 50 MG TB24 Take 50 mg by mouth every day.       No current facility-administered medications for this visit.      He  has a past medical history of Arrhythmia (2011); Heart burn; High cholesterol; Pneumonia (2018); and Renal disorder (2011).    Social History and Family History were reviewed and updated.    ROS   No chest pain, no shortness of breath, no abdominal pain and all other systems were reviewed and are negative.       Objective:     Blood pressure 106/70, pulse 67, temperature 36.2 °C (97.2 °F), height 1.778 m (5' 10\"), weight 85.4 kg (188 lb 3.2 oz), SpO2 95 %. Body mass index is 27 kg/m².   Physical Exam:  Constitutional: Alert, no distress.  Skin: Warm, dry, good turgor, no rashes in visible areas.  Eye: Equal, round and reactive, conjunctiva clear, " lids normal.  ENMT: Lips without lesions, good dentition, oropharynx clear.  Neck: Trachea midline, no masses.   Lymph: No cervical or supraclavicular lymphadenopathy  Respiratory: Unlabored respiratory effort, lungs appear clear to auscultation, no wheezes.  Cardiovascular: Regular rate and rhythm.  Penile exam deferred.  Psych: Alert and oriented x3, normal affect and mood.        Assessment and Plan:   The following treatment plan was discussed    1. Scrotal pain  Acute, new onset condition.  Symptoms secondary to a large right hydrocele.  Refer to urology for evaluation.  Discussed possible procedures.    2. Right hydrocele  New onset condition.  Refer to urology for evaluation.  - REFERRAL TO UROLOGY    Patient was seen for 15 minutes face to face of which > 50% of appointment time was spent on counseling and coordination of care regarding the above.    Followup: Return if symptoms worsen or fail to improve.    Please note that this dictation was created using voice recognition software. I have made every reasonable attempt to correct obvious errors, but I expect that there are errors of grammar and possibly content that I did not discover before finalizing the note.

## 2018-11-30 NOTE — ASSESSMENT & PLAN NOTE
This is a 73-year-old male who complains of a history of at least couple months of right scrotal swelling and pain.  Discomfort.  Ultrasound performed showed a large right hydrocele.  Also bilateral spermatoceles with the left side being worse than the right.  It was advised by his PCP to come in to get a referral to see urology.

## 2019-01-08 ENCOUNTER — TELEPHONE (OUTPATIENT)
Dept: MEDICAL GROUP | Facility: MEDICAL CENTER | Age: 74
End: 2019-01-08

## 2019-01-08 NOTE — TELEPHONE ENCOUNTER
Dr. Tsang,     Please see message below from  staff. I do not see this medication in patients chart.     Please advise.     Thank you,     Last Dubose  Medical Assistant

## 2019-01-08 NOTE — TELEPHONE ENCOUNTER
----- Message from Ewa Leger sent at 1/8/2019 11:13 AM PST -----  Patient came in requesting refill for   C-Nitroglycerin 0.12% ointment sent to [facility] - [place], [place] - [address] Thank you

## 2019-01-10 ENCOUNTER — PATIENT MESSAGE (OUTPATIENT)
Dept: MEDICAL GROUP | Facility: MEDICAL CENTER | Age: 74
End: 2019-01-10

## 2019-01-10 ENCOUNTER — TELEPHONE (OUTPATIENT)
Dept: MEDICAL GROUP | Facility: MEDICAL CENTER | Age: 74
End: 2019-01-10

## 2019-01-10 DIAGNOSIS — K64.9 HEMORRHOIDS, UNSPECIFIED HEMORRHOID TYPE: ICD-10-CM

## 2019-01-10 NOTE — TELEPHONE ENCOUNTER
I have never prescribed this medication for the patient.  I will send him a my chart message to ask what she is using this for.

## 2019-01-13 PROBLEM — K64.9 HEMORRHOIDS: Status: ACTIVE | Noted: 2019-01-13

## 2019-02-06 DIAGNOSIS — E78.5 HYPERLIPIDEMIA, UNSPECIFIED HYPERLIPIDEMIA TYPE: ICD-10-CM

## 2019-02-06 DIAGNOSIS — F33.0 MILD EPISODE OF RECURRENT MAJOR DEPRESSIVE DISORDER (HCC): ICD-10-CM

## 2019-02-07 RX ORDER — PAROXETINE 10 MG/1
TABLET, FILM COATED ORAL
Qty: 90 TAB | Refills: 0 | Status: SHIPPED | OUTPATIENT
Start: 2019-02-07 | End: 2019-10-30 | Stop reason: SDUPTHER

## 2019-02-07 RX ORDER — SIMVASTATIN 10 MG
TABLET ORAL
Qty: 90 TAB | Refills: 1 | Status: SHIPPED | OUTPATIENT
Start: 2019-02-07 | End: 2019-03-29 | Stop reason: SDUPTHER

## 2019-02-11 DIAGNOSIS — I10 ESSENTIAL HYPERTENSION: ICD-10-CM

## 2019-02-13 RX ORDER — METOPROLOL SUCCINATE 50 MG/1
50 TABLET, EXTENDED RELEASE ORAL DAILY
Qty: 90 TAB | Refills: 0 | Status: SHIPPED | OUTPATIENT
Start: 2019-02-13 | End: 2019-05-07 | Stop reason: SDUPTHER

## 2019-03-26 LAB
ALBUMIN SERPL-MCNC: 4.6 G/DL (ref 3.5–4.8)
ALBUMIN/GLOB SERPL: 2 {RATIO} (ref 1.2–2.2)
ALP SERPL-CCNC: 74 IU/L (ref 39–117)
ALT SERPL-CCNC: 26 IU/L (ref 0–44)
AST SERPL-CCNC: 27 IU/L (ref 0–40)
BILIRUB SERPL-MCNC: 0.5 MG/DL (ref 0–1.2)
BUN SERPL-MCNC: 16 MG/DL (ref 8–27)
BUN/CREAT SERPL: 14 (ref 10–24)
CALCIUM SERPL-MCNC: 9.6 MG/DL (ref 8.6–10.2)
CHLORIDE SERPL-SCNC: 107 MMOL/L (ref 96–106)
CHOLEST SERPL-MCNC: 222 MG/DL (ref 100–199)
CO2 SERPL-SCNC: 20 MMOL/L (ref 20–29)
CREAT SERPL-MCNC: 1.15 MG/DL (ref 0.76–1.27)
GLOBULIN SER CALC-MCNC: 2.3 G/DL (ref 1.5–4.5)
GLUCOSE SERPL-MCNC: 97 MG/DL (ref 65–99)
HDLC SERPL-MCNC: 33 MG/DL
LABORATORY COMMENT REPORT: ABNORMAL
LDLC SERPL CALC-MCNC: 160 MG/DL (ref 0–99)
POTASSIUM SERPL-SCNC: 4.4 MMOL/L (ref 3.5–5.2)
PROT SERPL-MCNC: 6.9 G/DL (ref 6–8.5)
SODIUM SERPL-SCNC: 141 MMOL/L (ref 134–144)
TRIGL SERPL-MCNC: 143 MG/DL (ref 0–149)
VLDLC SERPL CALC-MCNC: 29 MG/DL (ref 5–40)

## 2019-03-29 ENCOUNTER — OFFICE VISIT (OUTPATIENT)
Dept: MEDICAL GROUP | Facility: MEDICAL CENTER | Age: 74
End: 2019-03-29
Payer: MEDICARE

## 2019-03-29 VITALS
TEMPERATURE: 97.7 F | BODY MASS INDEX: 28.23 KG/M2 | SYSTOLIC BLOOD PRESSURE: 120 MMHG | HEART RATE: 65 BPM | OXYGEN SATURATION: 94 % | DIASTOLIC BLOOD PRESSURE: 60 MMHG | WEIGHT: 197.2 LBS | HEIGHT: 70 IN

## 2019-03-29 DIAGNOSIS — I10 ESSENTIAL HYPERTENSION: ICD-10-CM

## 2019-03-29 DIAGNOSIS — M19.012 ARTHRITIS OF LEFT SHOULDER REGION: ICD-10-CM

## 2019-03-29 DIAGNOSIS — N50.89 SCROTAL SWELLING: ICD-10-CM

## 2019-03-29 DIAGNOSIS — N50.82 SCROTAL PAIN: ICD-10-CM

## 2019-03-29 DIAGNOSIS — E78.5 HYPERLIPIDEMIA, UNSPECIFIED HYPERLIPIDEMIA TYPE: ICD-10-CM

## 2019-03-29 DIAGNOSIS — I48.20 CHRONIC ATRIAL FIBRILLATION (HCC): ICD-10-CM

## 2019-03-29 DIAGNOSIS — D12.6 TUBULAR ADENOMA OF COLON: ICD-10-CM

## 2019-03-29 PROCEDURE — 99214 OFFICE O/P EST MOD 30 MIN: CPT | Mod: 25 | Performed by: FAMILY MEDICINE

## 2019-03-29 PROCEDURE — 20610 DRAIN/INJ JOINT/BURSA W/O US: CPT | Performed by: FAMILY MEDICINE

## 2019-03-29 RX ORDER — SIMVASTATIN 20 MG
20 TABLET ORAL
Qty: 90 TAB | Refills: 1 | Status: SHIPPED | OUTPATIENT
Start: 2019-03-29 | End: 2019-09-18 | Stop reason: SDUPTHER

## 2019-03-29 NOTE — ASSESSMENT & PLAN NOTE
Patient states that Dr. Cordero would like him to have repeat colonoscopy this year, as patient had multiple tubular adenomas on last colonoscopy.  Patient to follow-up with his gastroenterologist.

## 2019-03-29 NOTE — ASSESSMENT & PLAN NOTE
Acute exacerbation chronic condition, uncontrolled, patient with worsening of left shoulder pain.  We discussed that this is most likely secondary to arthritis, or subacromial bursitis.  Pain is aching, worsened with movement, improved with rest.  Patient tries to take OTC analgesics at home, however this is insufficient at present time.      In the case, I have suggested that this can respond well to steroid injection, to which he responded well last time in October 2018.  Patient is amenable.

## 2019-03-29 NOTE — ASSESSMENT & PLAN NOTE
Patient and I discussed recent labs (see below; mixed dyslipidemia, uncontrolled) and that ASCVD risk is increased based on most recent lipid panel, current blood pressure (hypertensive, with medication), diabetes status (non-diabetic), and smoking status (non-smoker), With 10-year ASCVD risk being approximately 24.8%.    Patient and I then discussed necessary dietary changes to make to address dyslipidemia.  Patient is currently taking cholesterol lowering medication: Simvastatin, and that we should increase this as well as patient making further dietary changes to improve his cholesterol levels.  Patient verbalized understanding.    ROS is NEGATIVE for dizziness, generalized weakness/fatigue, vision/hearing changes, jaw pain/paresthesias, BUE pain/paresthesias/numbness/weakness, chest pain/pressure, palpitations, dyspnea, RUQ abdominal pain, oliguria/anuria, BLE edema.    Lab Results   Component Value Date/Time    CHOLSTRLTOT 222 (H) 03/25/2019 07:18 AM    CHOLSTRLTOT 153 03/01/2018 05:00 AM     (H) 03/25/2019 07:18 AM    LDL 82 03/01/2018 05:00 AM    HDL 33 (L) 03/25/2019 07:18 AM    HDL 29 (A) 03/01/2018 05:00 AM    TRIGLYCERIDE 143 03/25/2019 07:18 AM    TRIGLYCERIDE 209 (H) 03/01/2018 05:00 AM

## 2019-03-29 NOTE — ASSESSMENT & PLAN NOTE
Chronic, stable, well-controlled, taking medication as directed.     Patient is on anticoagulation with ASA.

## 2019-03-29 NOTE — PROGRESS NOTES
Subjective:   Chief Complaint/History of Present Illness:  Papo Souza is a 73 y.o. male established patient who presents today to discuss medical problems as listed below    Diagnoses of Hyperlipidemia, unspecified hyperlipidemia type, Essential hypertension, Chronic atrial fibrillation (HCC), Arthritis of left shoulder region, Scrotal pain, Scrotal swelling, and Tubular adenoma of colon were pertinent to this visit.    HLD (hyperlipidemia)  Patient and I discussed recent labs (see below; mixed dyslipidemia, uncontrolled) and that ASCVD risk is increased based on most recent lipid panel, current blood pressure (hypertensive, with medication), diabetes status (non-diabetic), and smoking status (non-smoker), With 10-year ASCVD risk being approximately 24.8%.    Patient and I then discussed necessary dietary changes to make to address dyslipidemia.  Patient is currently taking cholesterol lowering medication: Simvastatin, and that we should increase this as well as patient making further dietary changes to improve his cholesterol levels.  Patient verbalized understanding.    ROS is NEGATIVE for dizziness, generalized weakness/fatigue, vision/hearing changes, jaw pain/paresthesias, BUE pain/paresthesias/numbness/weakness, chest pain/pressure, palpitations, dyspnea, RUQ abdominal pain, oliguria/anuria, BLE edema.    Lab Results   Component Value Date/Time    CHOLSTRLTOT 222 (H) 03/25/2019 07:18 AM    CHOLSTRLTOT 153 03/01/2018 05:00 AM     (H) 03/25/2019 07:18 AM    LDL 82 03/01/2018 05:00 AM    HDL 33 (L) 03/25/2019 07:18 AM    HDL 29 (A) 03/01/2018 05:00 AM    TRIGLYCERIDE 143 03/25/2019 07:18 AM    TRIGLYCERIDE 209 (H) 03/01/2018 05:00 AM       Essential hypertension  Chronic, stable, well-controlled, taking medication as directed.      (HCC) Chronic atrial fibrillation  Chronic, stable, well-controlled, taking medication as directed.     Patient is on anticoagulation with ASA.     Arthritis of left  shoulder region  Acute exacerbation chronic condition, uncontrolled, patient with worsening of left shoulder pain.  We discussed that this is most likely secondary to arthritis, or subacromial bursitis.  Pain is aching, worsened with movement, improved with rest.  Patient tries to take OTC analgesics at home, however this is insufficient at present time.      In the case, I have suggested that this can respond well to steroid injection, to which he responded well last time in October 2018.  Patient is amenable.    Scrotal pain  This has resolved.    Scrotal swelling  This has resolved.    Tubular adenoma of colon  Patient states that Dr. Cordero would like him to have repeat colonoscopy this year, as patient had multiple tubular adenomas on last colonoscopy.  Patient to follow-up with his gastroenterologist.      Patient Active Problem List    Diagnosis Date Noted   • Diverticulosis of large intestine without hemorrhage 02/28/2018     Priority: High   • H/O cocaine use 10/12/2013     Priority: High   • (HCC) Chronic atrial fibrillation 10/12/2013     Priority: High   • HLD (hyperlipidemia) 02/28/2018     Priority: Low   • (HCC) Mild episode of recurrent major depressive disorder 02/28/2018     Priority: Low   • Hemorrhoids 01/13/2019   • Right hydrocele 11/30/2018   • Arthritis of left shoulder region 09/14/2018   • Tubular adenoma of colon 06/01/2018   • Essential hypertension 04/19/2018   • Burgess's esophagus with dysplasia 10/15/2013   • H/O left inguinal hernia 03/26/2013       Additional History:   Allergies:    Amoxicillin     Current Medications:     Current Outpatient Prescriptions   Medication Sig Dispense Refill   • simvastatin (ZOCOR) 20 MG Tab Take 1 Tab by mouth every day. 90 Tab 1   • metoprolol SR (TOPROL XL) 50 MG TABLET SR 24 HR Take 1 Tab by mouth every day. 90 Tab 0   • PARoxetine (PAXIL) 10 MG Tab TAKE 1 TABLET BY MOUTH EVERY DAY 90 Tab 0   • fenofibrate (TRIGLIDE) 160 MG tablet TAKE 1 TABLET BY  "MOUTH AT BEDTIME (Patient taking differently: TAKE 1 TABLET BY MOUTH Daily) 90 Tab 3   • aspirin (ASA) 325 MG Tab Take 162.5 mg by mouth every day.     • NON SPECIFIED C-NITROGLYCERIN 0.125% OINT SIG: 15, Refills: 2. Indiciation for use: Tenderness or swelling of minor hemorrhoids.  Directions for use: Apply to ravinder-rectal area twice a day, as needed. 15 Each 2     No current facility-administered medications for this visit.         Social History:     Social History   Substance Use Topics   • Smoking status: Former Smoker   • Smokeless tobacco: Never Used   • Alcohol use Yes      Comment: Occasionally       ROS:     - NOTE: All other systems reviewed and are negative, except as in HPI.     Objective:   Physical Exam:    Vitals: Blood pressure 120/60, pulse 65, temperature 36.5 °C (97.7 °F), height 1.778 m (5' 10\"), weight 89.4 kg (197 lb 3.2 oz), SpO2 94 %.   BMI: Body mass index is 28.3 kg/m².   General/Constitutional: Vitals as above, Well nourished, well developed male in no acute distress   Head/Eyes: Head is grossly normal & atraumatic, bilateral conjunctivae clear and not injected, bilateral EOMI, bilateral PERRL   ENT: Bilateral external ears grossly normal in appearance, Hearing grossly intact, External nares normal in appearance and without discharge/bleeding   Respiratory: No respiratory distress, bilateral lungs are clear to ausculation in all lung fields (anterior/lateral/posterior), no wheezing/rhonchi/rales   Cardiovascular: Regular rate and rhythm without murmur/gallops/rubs, distal pulses are intact and equal bilaterally (radial, posterior tibial), no bilateral lower extremity edema   MSK: Gait grossly normal & not antalgic   Integumentary: No apparent rashes   Psych: Judgment grossly appropriate, no apparent depression/anxiety    Procedure: L Shoulder Joint Injection    **NOTE: Sterile technique was maintained throughout procedure       1) Risks, benefits and alternatives of procedure were " discussed with patient including, but not limited to:        A) Risks: Bleeding (including hematoma), skin infection, nerve damage (paresthesias).          B) Benefits: Decreased pain, improved range of motion, improved comfort.        C) Alternatives: Physical Therapy, NSAIDs, other analgesics, referral to pain clinic.    2) Correct site was verified by patient and myself by direct palpation.  Site(s): L shoulder, posterior approach    3) Area(s) cleaned using alcohol swabs.    4) Local medication was drawn up (Lidocaine 1% (plain; 2mL), Kenalog 40mg/mL [1mL]).    5) Joint held firmly with non-dominant hand.  27gauge needle advanced to a depth of 1.5inch using posterior approach.      6) Band-aid applied to joint injection site.    7) Results:         A) Minimal bleeding with good hemostasis achieved.        B) The patient tolerated the procedure well.    Health Maintenance:     - Repeat colonoscopy in October 2019 per instructions of Dr. Cordero    Imaging/Labs:     - 03/26/19 -- Mixed DLD (LDL & TChol worsened), CMP WNL    - 11/15/18 -- Large right hydrocele    Assessment and Plan:   1. Hyperlipidemia, unspecified hyperlipidemia type  Acute exacerbation of chronic condition, uncontrolled, patient attributes this to eating more better than normal.  We did discuss dietary changes that he needs to make in further detail, and we are also increasing his simvastatin to 20 mg p.o. daily.  Patient to recheck labs in 6 months.   - simvastatin (ZOCOR) 20 MG Tab; Take 1 Tab by mouth every day.  Dispense: 90 Tab; Refill: 1   - Lipid Profile; Future    2. Essential hypertension  Chronic, stable, well-controlled.  Continue taking medication as directed.  We may need to decrease this in the future if blood pressure and/or pulse decrease    3. Chronic atrial fibrillation (HCC)  Chronic, stable, well-controlled.  Continue taking medication as directed.     4. Arthritis of left shoulder region  Acute exacerbation of chronic  condition, uncontrolled, addressed with shoulder injection as above.    5. Scrotal pain  6. Scrotal swelling  Resolved    7. Tubular adenoma of colon  Repeat colonoscopy later this year due to multiple adenomas        RTC: In 4 months for Labs follow-up, Management of L Shoulder Pain.    PLEASE NOTE: This dictation was created using voice recognition software. I have made every reasonable attempt to correct obvious errors, but I expect that there are errors of grammar and possibly content that I did not discover before finalizing the note.

## 2019-05-07 DIAGNOSIS — I10 ESSENTIAL HYPERTENSION: ICD-10-CM

## 2019-05-07 RX ORDER — METOPROLOL SUCCINATE 50 MG/1
TABLET, EXTENDED RELEASE ORAL
Qty: 90 TAB | Refills: 2 | Status: SHIPPED | OUTPATIENT
Start: 2019-05-07 | End: 2020-02-05

## 2019-06-25 ENCOUNTER — OFFICE VISIT (OUTPATIENT)
Dept: MEDICAL GROUP | Facility: MEDICAL CENTER | Age: 74
End: 2019-06-25
Payer: MEDICARE

## 2019-06-25 VITALS
DIASTOLIC BLOOD PRESSURE: 80 MMHG | HEART RATE: 86 BPM | TEMPERATURE: 98.2 F | RESPIRATION RATE: 14 BRPM | SYSTOLIC BLOOD PRESSURE: 120 MMHG | WEIGHT: 201.06 LBS | HEIGHT: 70 IN | BODY MASS INDEX: 28.78 KG/M2 | OXYGEN SATURATION: 98 %

## 2019-06-25 DIAGNOSIS — J02.9 ACUTE PHARYNGITIS, UNSPECIFIED ETIOLOGY: ICD-10-CM

## 2019-06-25 DIAGNOSIS — J06.9 ACUTE URI: ICD-10-CM

## 2019-06-25 LAB
INT CON NEG: NEGATIVE
INT CON POS: POSITIVE
S PYO AG THROAT QL: NEGATIVE

## 2019-06-25 PROCEDURE — 87880 STREP A ASSAY W/OPTIC: CPT | Performed by: FAMILY MEDICINE

## 2019-06-25 PROCEDURE — 99214 OFFICE O/P EST MOD 30 MIN: CPT | Performed by: FAMILY MEDICINE

## 2019-06-25 RX ORDER — BENZONATATE 100 MG/1
100 CAPSULE ORAL 3 TIMES DAILY PRN
Qty: 60 CAP | Refills: 0 | Status: SHIPPED | OUTPATIENT
Start: 2019-06-25 | End: 2019-11-27

## 2019-06-25 RX ORDER — ALBUTEROL SULFATE 90 UG/1
1-2 AEROSOL, METERED RESPIRATORY (INHALATION) EVERY 6 HOURS PRN
Qty: 8.5 G | Refills: 0 | Status: SHIPPED | OUTPATIENT
Start: 2019-06-25 | End: 2019-07-29 | Stop reason: SDUPTHER

## 2019-06-25 RX ORDER — CODEINE PHOSPHATE AND GUAIFENESIN 10; 100 MG/5ML; MG/5ML
10 SOLUTION ORAL EVERY 4 HOURS PRN
Qty: 420 ML | Refills: 0 | Status: SHIPPED | OUTPATIENT
Start: 2019-06-25 | End: 2019-07-02

## 2019-06-25 NOTE — PROGRESS NOTES
Chief Complaint   Patient presents with   • URI        HPI: This is a 73 y.o. patient has 4-5 days of sore throat, cough. No congestion. no runny nose. no ear fullness. No abnormal shortness of breath. No nausea vomiting or diarrhea. NO subjective fever and chills. no sick exposures (lives with wife). No coughing up blood. No headache.  Patient has taken over-the-counter analgesics and decongestant without relief.     ROS:  Positive for mildly productive cough, sore throat  No fever, nausea, changes in bowel movements or skin rash.      I reviewed the patient's medications, allergies and medical history:  Current Outpatient Prescriptions   Medication Sig Dispense Refill   • benzonatate (TESSALON) 100 MG Cap Take 1 Cap by mouth 3 times a day as needed for Cough. 60 Cap 0   • guaifenesin-codeine (ROBITUSSIN AC) Solution oral solution Take 10 mL by mouth every four hours as needed for Cough for up to 7 days. 420 mL 0   • albuterol 108 (90 Base) MCG/ACT Aero Soln inhalation aerosol Inhale 1-2 Puffs by mouth every 6 hours as needed for Shortness of Breath. 8.5 g 0   • metoprolol SR (TOPROL XL) 50 MG TABLET SR 24 HR TAKE 1 TABLET BY MOUTH EVERY DAY 90 Tab 2   • simvastatin (ZOCOR) 20 MG Tab Take 1 Tab by mouth every day. 90 Tab 1   • PARoxetine (PAXIL) 10 MG Tab TAKE 1 TABLET BY MOUTH EVERY DAY 90 Tab 0   • NON SPECIFIED C-NITROGLYCERIN 0.125% OINT SIG: 15, Refills: 2. Indiciation for use: Tenderness or swelling of minor hemorrhoids.  Directions for use: Apply to ravinder-rectal area twice a day, as needed. 15 Each 2   • fenofibrate (TRIGLIDE) 160 MG tablet TAKE 1 TABLET BY MOUTH AT BEDTIME (Patient taking differently: TAKE 1 TABLET BY MOUTH Daily) 90 Tab 3   • aspirin (ASA) 325 MG Tab Take 162.5 mg by mouth every day.       No current facility-administered medications for this visit.      Amoxicillin  Past Medical History:   Diagnosis Date   • Arrhythmia 2011    on metoprolol; hx of afib   • Heart burn    • High cholesterol   "  • Pneumonia 2018   • Renal disorder 2011    had short term kidney failure, with 3 dialysis treatments        EXAM:  /80 (BP Location: Left arm, Patient Position: Sitting, BP Cuff Size: Adult)   Pulse 86   Temp 36.8 °C (98.2 °F) (Temporal)   Resp 14   Ht 1.778 m (5' 10\")   Wt 91.2 kg (201 lb 1 oz)   SpO2 98%   General: NAD, non-toxic appearance.  Eyes: PERRL, conjunctiva slightly injected, no photophobia or eye discharge.  Ears: Normal pinnae. TM's pearly gray with good landmarks bilaterally.  Nares: Patent with thin, clear mucus.  Sinuses: Non-tender over maxillary and frontal sinuses.  Throat: Erythematous injection with enlarged tonsils. No exudates.   Neck: Supple, with shotty anterior cervical lymphadenopathy.  Lungs: Good air entry bilaterally, clear to auscultation. Minimal diffuse rhonchi, No wheeze, or crackles. Normal respiratory effort.  Heart: Regular rate without murmur.  Abdomen: Soft and non-tender. No hepatosplenomegaly.  Skin: Warm and dry. No rash.     Results for orders placed or performed in visit on 06/25/19   POCT Rapid Strep A   Result Value Ref Range    Rapid Strep Screen Negative     Internal Control Positive Positive     Internal Control Negative Negative       POCT Strep test NEGATIVE     ASSESSMENT:   1. Acute URI    2. Acute pharyngitis, unspecified etiology          PLAN:  1. Discussed benign nature of viral upper respiratory infections.  NEGATIVE strep test in office  2. OTC anti-pyretics and decongestants as needed.  We also I prescribed Tessalon Perles, albuterol inhaler to ease with breathing, as well as Cheratussin for cough.  Otherwise, supportive care instructions advised.  3. Follow-up in office or urgent care for worsening symptoms, difficulty breathing, lack of expected recovery, or should new symptoms or problems arise.  "

## 2019-07-24 LAB
CHOLEST SERPL-MCNC: 154 MG/DL (ref 100–199)
HDLC SERPL-MCNC: 34 MG/DL
LABORATORY COMMENT REPORT: ABNORMAL
LDLC SERPL CALC-MCNC: 95 MG/DL (ref 0–99)
TRIGL SERPL-MCNC: 127 MG/DL (ref 0–149)
VLDLC SERPL CALC-MCNC: 25 MG/DL (ref 5–40)

## 2019-07-29 ENCOUNTER — OFFICE VISIT (OUTPATIENT)
Dept: MEDICAL GROUP | Facility: MEDICAL CENTER | Age: 74
End: 2019-07-29
Payer: MEDICARE

## 2019-07-29 VITALS
HEIGHT: 70 IN | DIASTOLIC BLOOD PRESSURE: 72 MMHG | SYSTOLIC BLOOD PRESSURE: 112 MMHG | OXYGEN SATURATION: 98 % | HEART RATE: 72 BPM | WEIGHT: 189.38 LBS | BODY MASS INDEX: 27.11 KG/M2 | TEMPERATURE: 98.2 F | RESPIRATION RATE: 12 BRPM

## 2019-07-29 DIAGNOSIS — E78.2 MIXED HYPERLIPIDEMIA: ICD-10-CM

## 2019-07-29 DIAGNOSIS — K64.9 HEMORRHOIDS, UNSPECIFIED HEMORRHOID TYPE: ICD-10-CM

## 2019-07-29 DIAGNOSIS — I10 ESSENTIAL HYPERTENSION: ICD-10-CM

## 2019-07-29 DIAGNOSIS — Z00.00 MEDICARE ANNUAL WELLNESS VISIT, SUBSEQUENT: ICD-10-CM

## 2019-07-29 DIAGNOSIS — J06.9 ACUTE URI: ICD-10-CM

## 2019-07-29 DIAGNOSIS — D12.6 TUBULAR ADENOMA OF COLON: ICD-10-CM

## 2019-07-29 DIAGNOSIS — I48.20 CHRONIC ATRIAL FIBRILLATION (HCC): ICD-10-CM

## 2019-07-29 DIAGNOSIS — Z12.5 PROSTATE CANCER SCREENING: ICD-10-CM

## 2019-07-29 DIAGNOSIS — K22.719 BARRETT'S ESOPHAGUS WITH DYSPLASIA: ICD-10-CM

## 2019-07-29 DIAGNOSIS — J02.9 ACUTE PHARYNGITIS, UNSPECIFIED ETIOLOGY: ICD-10-CM

## 2019-07-29 PROCEDURE — 99214 OFFICE O/P EST MOD 30 MIN: CPT | Performed by: FAMILY MEDICINE

## 2019-07-29 RX ORDER — ALBUTEROL SULFATE 90 UG/1
1-2 AEROSOL, METERED RESPIRATORY (INHALATION) EVERY 6 HOURS PRN
Qty: 8.5 G | Refills: 0 | Status: SHIPPED | OUTPATIENT
Start: 2019-07-29 | End: 2019-11-27

## 2019-07-29 NOTE — ASSESSMENT & PLAN NOTE
Chronic, stable, well-controlled present time, patient uses the nitroglycerin topical as needed for when his external hemorrhoids flareup.  He is not currently having any symptoms, no thrombosed hemorrhoids, no bleeding, and no constipated stools.

## 2019-07-29 NOTE — ASSESSMENT & PLAN NOTE
Patient and I discussed recent labs (see below; mixed dyslipidemia, well-controlled apart from low HDL) and that ASCVD risk is increased based on most recent lipid panel, current blood pressure (hypertensive, with medication), diabetes status (non-diabetic), and smoking status (non-smoker).    Patient and I then discussed necessary dietary changes to make to address dyslipidemia.  Patient is currently taking cholesterol lowering medication: Simvastatin.  Patient verbalized understanding.    ROS is NEGATIVE for dizziness, generalized weakness/fatigue, vision/hearing changes, jaw pain/paresthesias, BUE pain/paresthesias/numbness/weakness, chest pain/pressure, palpitations, dyspnea, RUQ abdominal pain, oliguria/anuria, BLE edema.    Lab Results   Component Value Date/Time    CHOLSTRLTOT 154 07/23/2019 07:18 AM    CHOLSTRLTOT 153 03/01/2018 05:00 AM    LDL 95 07/23/2019 07:18 AM    LDL 82 03/01/2018 05:00 AM    HDL 34 (L) 07/23/2019 07:18 AM    HDL 29 (A) 03/01/2018 05:00 AM    TRIGLYCERIDE 127 07/23/2019 07:18 AM    TRIGLYCERIDE 209 (H) 03/01/2018 05:00 AM

## 2019-07-29 NOTE — ASSESSMENT & PLAN NOTE
Chronic, stable, well-controlled.  Patient had endoscopy last year with Dr. Cordero and will continue follow-up with him.

## 2019-07-29 NOTE — ASSESSMENT & PLAN NOTE
Established problem, uncontrolled, unclear if patient is to return in 3 years or 5years from previous (as this was his second episode with precancerous polyps), or if they wanted to see him within the year (per patient's report).  Therefore, we will send referral.    ROS is NEGATIVE for fevers, chills, abdominal pain, hematochezia, melena.

## 2019-07-29 NOTE — PROGRESS NOTES
Subjective:   Chief Complaint/History of Present Illness:  Papo Souza is a 73 y.o. male established patient who presents today to discuss medical problems as listed below    Diagnoses of Mixed hyperlipidemia, Tubular adenoma of colon, Essential hypertension, Hemorrhoids, unspecified hemorrhoid type, Burgess's esophagus with dysplasia, Medicare annual wellness visit, subsequent, (HCC) Chronic atrial fibrillation, and Prostate cancer screening were pertinent to this visit.    HLD (hyperlipidemia)  Patient and I discussed recent labs (see below; mixed dyslipidemia, well-controlled apart from low HDL) and that ASCVD risk is increased based on most recent lipid panel, current blood pressure (hypertensive, with medication), diabetes status (non-diabetic), and smoking status (non-smoker).    Patient and I then discussed necessary dietary changes to make to address dyslipidemia.  Patient is currently taking cholesterol lowering medication: Simvastatin.  Patient verbalized understanding.    ROS is NEGATIVE for dizziness, generalized weakness/fatigue, vision/hearing changes, jaw pain/paresthesias, BUE pain/paresthesias/numbness/weakness, chest pain/pressure, palpitations, dyspnea, RUQ abdominal pain, oliguria/anuria, BLE edema.    Lab Results   Component Value Date/Time    CHOLSTRLTOT 154 07/23/2019 07:18 AM    CHOLSTRLTOT 153 03/01/2018 05:00 AM    LDL 95 07/23/2019 07:18 AM    LDL 82 03/01/2018 05:00 AM    HDL 34 (L) 07/23/2019 07:18 AM    HDL 29 (A) 03/01/2018 05:00 AM    TRIGLYCERIDE 127 07/23/2019 07:18 AM    TRIGLYCERIDE 209 (H) 03/01/2018 05:00 AM       Tubular adenoma of colon  Established problem, uncontrolled, unclear if patient is to return in 3 years or 5years from previous (as this was his second episode with precancerous polyps), or if they wanted to see him within the year (per patient's report).  Therefore, we will send referral.    ROS is NEGATIVE for fevers, chills, abdominal pain, hematochezia,  melena.    Hemorrhoids  Chronic, stable, well-controlled present time, patient uses the nitroglycerin topical as needed for when his external hemorrhoids flareup.  He is not currently having any symptoms, no thrombosed hemorrhoids, no bleeding, and no constipated stools.    Burgess's esophagus with dysplasia  Chronic, stable, well-controlled.  Patient had endoscopy last year with Dr. Cordero and will continue follow-up with him.      Patient Active Problem List    Diagnosis Date Noted   • Diverticulosis of large intestine without hemorrhage 02/28/2018     Priority: High   • H/O cocaine use 10/12/2013     Priority: High   • (HCC) Chronic atrial fibrillation 10/12/2013     Priority: High   • HLD (hyperlipidemia) 02/28/2018     Priority: Low   • (HCC) Mild episode of recurrent major depressive disorder 02/28/2018     Priority: Low   • Hemorrhoids 01/13/2019   • Right hydrocele 11/30/2018   • Arthritis of left shoulder region 09/14/2018   • Tubular adenoma of colon 06/01/2018   • Essential hypertension 04/19/2018   • Burgess's esophagus with dysplasia 10/15/2013   • H/O left inguinal hernia 03/26/2013       Additional History:   Allergies:    Amoxicillin     Current Medications:     Current Outpatient Prescriptions   Medication Sig Dispense Refill   • NON SPECIFIED C-NITROGLYCERIN 0.125% OINT SIG: 15, Refills: 2. Indiciation for use: Tenderness or swelling of minor hemorrhoids.  Directions for use: Apply to ravinder-rectal area twice a day, as needed. 15 Each 2   • benzonatate (TESSALON) 100 MG Cap Take 1 Cap by mouth 3 times a day as needed for Cough. 60 Cap 0   • albuterol 108 (90 Base) MCG/ACT Aero Soln inhalation aerosol Inhale 1-2 Puffs by mouth every 6 hours as needed for Shortness of Breath. 8.5 g 0   • metoprolol SR (TOPROL XL) 50 MG TABLET SR 24 HR TAKE 1 TABLET BY MOUTH EVERY DAY 90 Tab 2   • simvastatin (ZOCOR) 20 MG Tab Take 1 Tab by mouth every day. 90 Tab 1   • PARoxetine (PAXIL) 10 MG Tab TAKE 1 TABLET BY MOUTH  "EVERY DAY 90 Tab 0   • fenofibrate (TRIGLIDE) 160 MG tablet TAKE 1 TABLET BY MOUTH AT BEDTIME (Patient taking differently: TAKE 1 TABLET BY MOUTH Daily) 90 Tab 3   • aspirin (ASA) 325 MG Tab Take 162.5 mg by mouth every day.       No current facility-administered medications for this visit.         Social History:     Social History   Substance Use Topics   • Smoking status: Former Smoker   • Smokeless tobacco: Never Used   • Alcohol use Yes      Comment: Occasionally       ROS:     - NOTE: All other systems reviewed and are negative, except as in HPI.     Objective:   Physical Exam:    Vitals: /72 (BP Location: Left arm, Patient Position: Sitting, BP Cuff Size: Adult)   Pulse 72   Temp 36.8 °C (98.2 °F) (Temporal)   Resp 12   Ht 1.778 m (5' 10\")   Wt 85.9 kg (189 lb 6 oz)   SpO2 98%    BMI: Body mass index is 27.17 kg/m².   General/Constitutional: Vitals as above, Well nourished, well developed male in no acute distress   Head/Eyes: Head is grossly normal & atraumatic, bilateral conjunctivae clear and not injected, bilateral EOMI, bilateral PERRL   ENT: Bilateral external ears grossly normal in appearance, Hearing grossly intact, External nares normal in appearance and without discharge/bleeding   Respiratory: No respiratory distress, bilateral lungs are clear to ausculation in all lung fields (anterior/lateral/posterior), no wheezing/rhonchi/rales   Cardiovascular: Regular rate and rhythm without murmur/gallops/rubs, distal pulses are intact and equal bilaterally (radial, posterior tibial), no bilateral lower extremity edema   MSK: Gait grossly normal & not antalgic   Integumentary: No apparent rashes   Psych: Judgment grossly appropriate, no apparent depression/anxiety    Health Maintenance:     - Colonoscopy in May 2018 had tubular adenomas, and patient states that he was told to return within the year.  We will place referrals so that [facility] will review his " case.    Imaging/Labs:     - 07/24/19 -- Low HDL, cholesterol levels o/w improved/normal    Assessment and Plan:   1. Mixed hyperlipidemia  Chronic, stable, well-controlled.  Continue taking medication as directed.    - Lipid Profile; Future    2. Tubular adenoma of colon  Established problem, uncontrolled, referral placed to [facility] so that they will either have consultation with patient, or let him know over the phone whether he needs to return this year, in 3 years from previous, or 5 years from previous colonoscopy.   - REFERRAL TO GI FOR COLONOSCOPY    3. Essential hypertension  Chronic, stable, well-controlled.  Continue taking medication as directed.    - Comp Metabolic Panel; Future   - Lipid Profile; Future   - MICROALBUMIN CREAT RATIO URINE; Future    4. Hemorrhoids, unspecified hemorrhoid type  Chronic, stable, well-controlled.  Continue taking medication as directed.      - NON SPECIFIED; C-NITROGLYCERIN 0.125% OINT SIG: 15, Refills: 2. Indiciation for use: Tenderness or swelling of minor hemorrhoids.  Directions for use: Apply to ravinder-rectal area twice a day, as needed.  Dispense: 15 Each; Refill: 2    5. Burgess's esophagus with dysplasia  Chronic, stable, well-controlled.  Continue follow-up with gastroenterologist, Dr. Cordero.   - Comp Metabolic Panel; Future    6. Medicare annual wellness visit, subsequent  7. (HCC) Chronic atrial fibrillation  8. Prostate cancer screening  Lab orders placed for Medicare annual wellness visit   - Comp Metabolic Panel; Future   - Lipid Profile; Future   - PROSTATE SPECIFIC AG SCREENING; Future   - MICROALBUMIN CREAT RATIO URINE; Future   - TSH WITH REFLEX TO FT4; Future    RTC: in 02/2020 for Medicare Annual Wellness Visit.    PLEASE NOTE: This dictation was created using voice recognition software. I have made every reasonable attempt to correct obvious errors, but I expect that there are errors of grammar and possibly content that I did not  discover before finalizing the note.

## 2019-08-05 RX ORDER — FENOFIBRATE 160 MG/1
TABLET ORAL
Qty: 90 TAB | Refills: 0 | Status: SHIPPED | OUTPATIENT
Start: 2019-08-05 | End: 2019-10-11 | Stop reason: SDUPTHER

## 2019-09-18 DIAGNOSIS — E78.5 HYPERLIPIDEMIA, UNSPECIFIED HYPERLIPIDEMIA TYPE: ICD-10-CM

## 2019-09-18 RX ORDER — SIMVASTATIN 20 MG
TABLET ORAL
Qty: 90 TAB | Refills: 1 | Status: SHIPPED | OUTPATIENT
Start: 2019-09-18 | End: 2020-03-19

## 2019-10-14 RX ORDER — FENOFIBRATE 160 MG/1
TABLET ORAL
Qty: 90 TAB | Refills: 1 | Status: SHIPPED | OUTPATIENT
Start: 2019-10-14 | End: 2020-02-24 | Stop reason: SDUPTHER

## 2019-10-22 ENCOUNTER — NON-PROVIDER VISIT (OUTPATIENT)
Dept: MEDICAL GROUP | Facility: MEDICAL CENTER | Age: 74
End: 2019-10-22
Payer: MEDICARE

## 2019-10-22 DIAGNOSIS — Z23 NEED FOR VACCINATION: ICD-10-CM

## 2019-10-22 PROCEDURE — 90662 IIV NO PRSV INCREASED AG IM: CPT | Performed by: INTERNAL MEDICINE

## 2019-10-22 PROCEDURE — G0008 ADMIN INFLUENZA VIRUS VAC: HCPCS | Performed by: INTERNAL MEDICINE

## 2019-10-22 NOTE — NON-PROVIDER
"ANDREW Souza is a 74 y.o. male here for a non-provider visit for:   FLU    Reason for immunization: Annual Flu Vaccine  Immunization records indicate need for vaccine: Yes, confirmed with Epic  Minimum interval has been met for this vaccine: Yes  ABN completed: Not Indicated    Order and dose verified by: NATALEE  VIS Dated  08/15/2019 was given to patient: Yes  All IAC Questionnaire questions were answered \"No.\"    Patient tolerated injection and no adverse effects were observed or reported: Yes    Pt scheduled for next dose in series: Not Indicated    "

## 2019-10-30 DIAGNOSIS — F33.0 MILD EPISODE OF RECURRENT MAJOR DEPRESSIVE DISORDER (HCC): ICD-10-CM

## 2019-10-30 RX ORDER — PAROXETINE 10 MG/1
TABLET, FILM COATED ORAL
Qty: 90 TAB | Refills: 0 | Status: SHIPPED | OUTPATIENT
Start: 2019-10-30 | End: 2020-01-23

## 2019-11-27 ENCOUNTER — OFFICE VISIT (OUTPATIENT)
Dept: MEDICAL GROUP | Facility: MEDICAL CENTER | Age: 74
End: 2019-11-27
Payer: MEDICARE

## 2019-11-27 VITALS
OXYGEN SATURATION: 94 % | HEART RATE: 78 BPM | WEIGHT: 196.8 LBS | BODY MASS INDEX: 28.18 KG/M2 | DIASTOLIC BLOOD PRESSURE: 64 MMHG | SYSTOLIC BLOOD PRESSURE: 122 MMHG | RESPIRATION RATE: 16 BRPM | TEMPERATURE: 98.1 F | HEIGHT: 70 IN

## 2019-11-27 DIAGNOSIS — H61.22 IMPACTED CERUMEN OF LEFT EAR: ICD-10-CM

## 2019-11-27 DIAGNOSIS — Z86.79 HISTORY OF ATRIAL FIBRILLATION: ICD-10-CM

## 2019-11-27 DIAGNOSIS — E78.49 OTHER HYPERLIPIDEMIA: ICD-10-CM

## 2019-11-27 DIAGNOSIS — I10 ESSENTIAL HYPERTENSION: ICD-10-CM

## 2019-11-27 DIAGNOSIS — Z12.5 ENCOUNTER FOR SCREENING FOR MALIGNANT NEOPLASM OF PROSTATE: ICD-10-CM

## 2019-11-27 DIAGNOSIS — Z87.891 HISTORY OF TOBACCO USE: ICD-10-CM

## 2019-11-27 DIAGNOSIS — Z23 NEED FOR VACCINATION: ICD-10-CM

## 2019-11-27 DIAGNOSIS — K76.0 HEPATIC STEATOSIS: ICD-10-CM

## 2019-11-27 DIAGNOSIS — Z00.00 PREVENTATIVE HEALTH CARE: ICD-10-CM

## 2019-11-27 PROCEDURE — G0009 ADMIN PNEUMOCOCCAL VACCINE: HCPCS | Performed by: INTERNAL MEDICINE

## 2019-11-27 PROCEDURE — 99204 OFFICE O/P NEW MOD 45 MIN: CPT | Performed by: INTERNAL MEDICINE

## 2019-11-27 PROCEDURE — 90732 PPSV23 VACC 2 YRS+ SUBQ/IM: CPT | Performed by: INTERNAL MEDICINE

## 2019-11-27 NOTE — PROGRESS NOTES
CC:  Diagnoses of History of atrial fibrillation, Hepatic steatosis, History of tobacco use, Preventative health care, Encounter for screening for malignant neoplasm of prostate, Other hyperlipidemia, Essential hypertension, and Need for vaccination were pertinent to this visit.    HISTORY OF THE PRESENT ILLNESS: Patient is a 74 y.o. male. This pleasant patient is here today to establish care.    He indicates he has concerned about his left ear, for about a week he feels like there has been a foreign body or sensation of fullness.  He denies any related pain, drainage, fever, vertigo, tinnitus.    Medical history includes atrial fibrillation.  I reviewed prior notes from cardiology Dr. Martin from 10/10/2016 indicating the atrial fibrillation was a one-time episode 2011 in the setting of alcohol and cocaine, he was hospitalized at that time.  He has since been sober from all alcohol, cocaine and even tobacco without any recurrence of atrial fibrillation.  Patient continues on daily aspirin for prevention.  Further he denies any angina, palpitations, or other cardiopulmonary symptoms.    Hepatic steatosis was noted on imaging from 2/28/2018 associated with mild splenomegaly.  Patient historically says he did drink very heavily for 1 year. Again he has been sober since 2011.  He denies any GI symptoms.  He does indicate he can improve his diet further.    History of tobacco use, he smoked for 50 years approximately 0.6 packs/day.  He quit in 2011.  Again he denies any pulmonary symptoms.  He is agreeable to attend the lung cancer screening program.  Patient had a CT renal colic imaging 2/20/2018 and that time visualized portions of the lungs showed left lower lobe consolidation with bilateral bronchial wall thickening suspicious for aspiration.  Patient denies any dysphasia or GERD.  History of Burgess's, patient says he is up-to-date on his upper EGD.  He says he has a regular gastroenterologist Dr. Cordero.  He says  he is pending colonoscopy 12/31/2019 with his history of tubular adenoma.    History of dyslipidemia, labs in 7/23/2019 total cholesterol 154, triglycerides 127, HDL 34 and LDL 95.  Patient states compliance with simvastatin 20 mg daily and also noted he is on fenofibrate.  Tolerating the combination.      Allergies: Amoxicillin    Current Outpatient Medications Ordered in Epic   Medication Sig Dispense Refill   • PARoxetine (PAXIL) 10 MG Tab TAKE 1 TABLET BY MOUTH EVERY DAY 90 Tab 0   • fenofibrate (TRIGLIDE) 160 MG tablet TAKE 1 TABLET BY MOUTH EVERY DAY 90 Tab 1   • simvastatin (ZOCOR) 20 MG Tab TAKE 1 TABLET BY MOUTH EVERY DAY 90 Tab 1   • NON SPECIFIED C-NITROGLYCERIN 0.125% OINT SIG: 15, Refills: 2. Indiciation for use: Tenderness or swelling of minor hemorrhoids.  Directions for use: Apply to ravinder-rectal area twice a day, as needed. 15 Each 2   • metoprolol SR (TOPROL XL) 50 MG TABLET SR 24 HR TAKE 1 TABLET BY MOUTH EVERY DAY 90 Tab 2   • aspirin (ASA) 325 MG Tab Take 162.5 mg by mouth every day.       No current [facility]-ordered facility-administered medications on file.        Past Medical History:   Diagnosis Date   • Arrhythmia 2011    on metoprolol; hx of afib   • Heart burn    • High cholesterol    • Pneumonia 2018   • Renal disorder 2011    had short term kidney failure, with 3 dialysis treatments       Past Surgical History:   Procedure Laterality Date   • COLONOSCOPY N/A 9/10/2018    Procedure: COLONOSCOPY;  Surgeon: Migel Cordero M.D.;  Location: SURGERY SAME DAY [facility];  Service: Gastroenterology   • GASTROSCOPY-ENDO  10/13/2013    Performed by Oseas Madden M.D. at ENDOSCOPY [facility] ORS   • INGUINAL HERNIA REPAIR  3/26/2013    Performed by Felipe Carter M.D. at SURGERY SAME DAY [facility] ORS   • OTHER  2011    dialysis cath insertion and removal   • COLONOSCOPY         Social History     Tobacco Use   • Smoking status: Former Smoker     Packs/day: 0.60     Years: 50.00     Pack  "years: 30.00     Types: Cigarettes     Last attempt to quit:      Years since quittin.9   • Smokeless tobacco: Never Used   Substance Use Topics   • Alcohol use: Not Currently     Comment: quit  (hx heavy use 1 yr)   • Drug use: Not Currently     Comment: History of cocaine use quit        Social History     Patient does not qualify to have social determinant information on file (likely too young).   Social History Narrative   • Not on file       Family History   Problem Relation Age of Onset   • No Known Problems Mother    • No Known Problems Father        ROS:     - Constitutional: Negative for fever, chills    - Eyes:   Negative for blurry vision, eye pain, discharge    - ENT: See HPI  - Respiratory: Negative for cough, sputum production, chest congestion, dyspnea, wheezing, and crackles.      - Cardiovascular: Negative for chest pain, palpitations, orthopnea, and bilateral lower extremity edema.     - Gastrointestinal: Negative for heartburn, nausea, vomiting, abdominal pain, hematochezia, melena, diarrhea, constipation, and greasy/foul-smelling stools.     - Genitourinary: Negative for dysuria    - Musculoskeletal: Negative for myalgias, back pain, and joint pain.     - Skin: Negative for rash, itching, cyanotic skin color change.     - Neurological: Negative for migraines, numbness, ataxia, tremors, vertigo    - Endo:Negative for polyuria, heat/cold intolerance, excessive thirst    - Hem/lymphatic: Negative for easy bruising, blood clots, lymphedema, swollen glands    -Allergic/immun: Negative for allergic rhinitis    - Psychiatric/Behavioral: Negative for depression, suicidal/homicidal ideation and memory loss.      Exam: /64 (BP Location: Left arm, Patient Position: Sitting, BP Cuff Size: Adult)   Pulse 78   Temp 36.7 °C (98.1 °F) (Temporal)   Resp 16   Ht 1.778 m (5' 10\")   Wt 89.3 kg (196 lb 12.8 oz)   SpO2 94%  Body mass index is 28.24 kg/m².    General: Normal appearing. No " distress.  EYES: Conjunctiva clear lids without ptosis, pupils equal  EARS: Normal shape and contour.  Right ear canal clear with normal tympanic membrane.  Left ear totally obstructed with wax.  NOSE, THROAT: nasal mucosa benign. oropharynx is without erythema, edema or exudates.   Neck: Supple without LAD. Thyroid is not enlarged.  Pulmonary: Clear to ausculation.  Normal effort. No rales or wheezing.  Cardiovascular: Regular rate and rhythm without significant murmur.   Abdomen: Soft, nontender, nondistended. Normal bowel sounds.  Neurologic: Cranial nerves grossly nonfocal  Lymph: No cervical, supraclavicular nodes palpable  Skin: Warm and dry.  No obvious lesions.  Musculoskeletal: Normal gait. No extremity cyanosis, clubbing, or edema.  Psych: Normal mood and affect. Alert and oriented x3. Judgment and insight is normal.        Assessment/Plan  1. History of atrial fibrillation  This was a one-time episode 2011 in the setting of alcohol/cocaine.  There has been no recurrence.  Patient was cleared by cardiology, evaluated 10/10/2016.  Reasonable to continue aspirin this time.  - TSH WITH REFLEX TO FT4; Future    2. Hepatic steatosis  Stable, chronic, will continue to monitor.  Patient is sober from alcohol at this time.  He will work on regular exercise and healthy diet.  - Comp Metabolic Panel; Future  - Lipid Profile; Future    3. History of tobacco use  - REFERRAL TO LUNG CANCER SCREENING PROGRAM    4. Preventative health care  - CBC WITH DIFFERENTIAL; Future  - Comp Metabolic Panel; Future  - Lipid Profile; Future  - TSH WITH REFLEX TO FT4; Future    5. Encounter for screening for malignant neoplasm of prostate  - PROSTATE SPECIFIC AG SCREENING; Future    6. Other hyperlipidemia  Historically stable, chronic, continue simvastatin and fenofibrate which has been well-tolerated.  - Comp Metabolic Panel; Future  - Lipid Profile; Future    7. Essential hypertension  Stable, chronic, continue metoprolol 50 mg  daily.  - CBC WITH DIFFERENTIAL; Future  - Comp Metabolic Panel; Future    8. Need for vaccination  - Pneumococal Polysaccharide Vaccine 23-Valent =>1YO SQ/IM    9.  Earwax  Massive amount of wax was removed from left ear.  Patient says his ear symptoms did resolve.  He knows he can return to clinic next week if he has any residual symptoms.    rtc 3mo      Please note that this dictation was created using voice recognition software. I have made every reasonable attempt to correct obvious errors, but I expect that there are errors of grammar and possibly content that I did not discover before finalizing the note.

## 2019-12-06 ENCOUNTER — OFFICE VISIT (OUTPATIENT)
Dept: MEDICAL GROUP | Facility: MEDICAL CENTER | Age: 74
End: 2019-12-06
Payer: MEDICARE

## 2019-12-06 VITALS
HEIGHT: 70 IN | BODY MASS INDEX: 28.09 KG/M2 | DIASTOLIC BLOOD PRESSURE: 64 MMHG | SYSTOLIC BLOOD PRESSURE: 118 MMHG | OXYGEN SATURATION: 95 % | RESPIRATION RATE: 14 BRPM | WEIGHT: 196.21 LBS | HEART RATE: 65 BPM | TEMPERATURE: 97.6 F

## 2019-12-06 DIAGNOSIS — J22 LRTI (LOWER RESPIRATORY TRACT INFECTION): ICD-10-CM

## 2019-12-06 PROCEDURE — 99214 OFFICE O/P EST MOD 30 MIN: CPT | Performed by: INTERNAL MEDICINE

## 2019-12-06 RX ORDER — AZITHROMYCIN 250 MG/1
TABLET, FILM COATED ORAL
Qty: 6 TAB | Refills: 0 | Status: SHIPPED | OUTPATIENT
Start: 2019-12-06 | End: 2019-12-27

## 2019-12-06 NOTE — PROGRESS NOTES
CHIEF COMPLAINT  Cold sx    HPI  Patient is a 74 y.o. male patient who presents today for the following     Cold sx  The patient got sick ~ 10 days ago with:  · Nasal congestion w yellow d/c  · Postnasal drip, pain in sinus areas  · Cough with yellow sputum  · Wheezing    Denies:   · Fever, chills,  body aches, fatigue,HA  · Sore throat  · Swollen glands, difficulty swallowing  · Earache  · Chest pain  · Decreased appetite, nausea, vomiting, diarrhea, abdominal pain  · Skin rash.    · Meds used:   Cough med  · Flu vaccine:    yes    Reviewed PMH, PSH, FH, SH, ALL, HCM/IMM, no changes  Reviewed MEDS, no changes    Patient Active Problem List    Diagnosis Date Noted   • Diverticulosis of large intestine without hemorrhage 02/28/2018     Priority: High   • H/O cocaine use 10/12/2013     Priority: High   • History of atrial fibrillation 10/12/2013     Priority: High   • Hepatic steatosis 03/01/2018     Priority: Medium   • HLD (hyperlipidemia) 02/28/2018     Priority: Low   • (HCC) Mild episode of recurrent major depressive disorder 02/28/2018     Priority: Low   • History of tobacco use 11/27/2019   • Preventative health care 11/27/2019   • Hemorrhoids 01/13/2019   • Right hydrocele 11/30/2018   • Arthritis of left shoulder region 09/14/2018   • Tubular adenoma of colon 06/01/2018   • Essential hypertension 04/19/2018   • Burgess's esophagus with dysplasia 10/15/2013   • H/O left inguinal hernia 03/26/2013     CURRENT MEDICATIONS  Current Outpatient Medications   Medication Sig Dispense Refill   • PARoxetine (PAXIL) 10 MG Tab TAKE 1 TABLET BY MOUTH EVERY DAY 90 Tab 0   • fenofibrate (TRIGLIDE) 160 MG tablet TAKE 1 TABLET BY MOUTH EVERY DAY 90 Tab 1   • simvastatin (ZOCOR) 20 MG Tab TAKE 1 TABLET BY MOUTH EVERY DAY 90 Tab 1   • NON SPECIFIED C-NITROGLYCERIN 0.125% OINT SIG: 15, Refills: 2. Indiciation for use: Tenderness or swelling of minor hemorrhoids.  Directions for use: Apply to ravinder-rectal area twice a day, as  needed. 15 Each 2   • metoprolol SR (TOPROL XL) 50 MG TABLET SR 24 HR TAKE 1 TABLET BY MOUTH EVERY DAY 90 Tab 2   • aspirin (ASA) 325 MG Tab Take 162.5 mg by mouth every day.       No current facility-administered medications for this visit.      ALLERGIES  Allergies: Amoxicillin  PAST MEDICAL HISTORY  Past Medical History:   Diagnosis Date   • Pneumonia    • Renal disorder     had short term kidney failure, with 3 dialysis treatments   • Arrhythmia     on metoprolol; hx of afib   • Heart burn    • High cholesterol      SURGICAL HISTORY  He  has a past surgical history that includes other (); inguinal hernia repair (3/26/2013); gastroscopy-endo (10/13/2013); colonoscopy; and colonoscopy (N/A, 9/10/2018).  SOCIAL HISTORY  Social History     Tobacco Use   • Smoking status: Former Smoker     Packs/day: 0.60     Years: 50.00     Pack years: 30.00     Types: Cigarettes     Last attempt to quit:      Years since quittin.9   • Smokeless tobacco: Never Used   Substance Use Topics   • Alcohol use: Not Currently     Comment: quit  (hx heavy use 1 yr)   • Drug use: Not Currently     Comment: History of cocaine use quit      Social History     Patient does not qualify to have social determinant information on file (likely too young).   Social History Narrative   • Not on file     FAMILY HISTORY  Family History   Problem Relation Age of Onset   • No Known Problems Mother    • No Known Problems Father      Family Status   Relation Name Status   • Mo   at age 98   • Fa   at age 90     ROS   Constitutional: as above.  HENT: as above.  Eyes: Negative for blurred vision.   Respiratory: as above.  Cardiovascular: Negative for chest pain, palpitations.   Gastrointestinal: Negative for heartburn, nausea, abdominal pain.   Genitourinary: Negative for dysuria.  Musculoskeletal: Negative for significant myalgias, back pain and joint pain.   Skin: Negative for rash and itching.   Neuro:  "Negative for dizziness, weakness and headaches.   Endo/Heme/Allergies: Does not bruise/bleed easily.   Psychiatric/Behavioral: Negative for depression.    PHYSICAL EXAM   Blood Pressure 118/64   Pulse 65   Temperature 36.4 °C (97.6 °F)   Respiration 14   Height 1.778 m (5' 10\")   Weight 89 kg (196 lb 3.4 oz)   Oxygen Saturation 95%   Body Mass Index 28.15 kg/m²   General:  NAD, appears acutely sick  HEENT:   NC/AT, PERRLA, EOMI, TMs are clear. Pharyngeal mucosa is erythematous,  without lesions;  no cervical / supraclavicular  lymphadenopathy, no thyromegaly.    Cardiovascular: RRR.   No m/r/g. No carotid bruits .      Lungs:   CTAB, no w/r/r, no respiratory distress.  Abdomen: Soft, NT/ND + BS; no suprapubic tenderness; no masses or hepatosplenomegaly.  Extremities:  2+ DP and radial pulses bilaterally.  No c/c/e.   Skin:  Warm, dry.  No erythema. No rash.   Neurologic: Alert & oriented x 3. No focal deficits.  Psychiatric:  Affect normal, mood normal, judgment normal.    LABS     Labs are reviewed for medication prescription, and discussed with a patient    Lab Results   Component Value Date/Time    CHOLSTRLTOT 154 07/23/2019 07:18 AM    CHOLSTRLTOT 153 03/01/2018 05:00 AM    LDL 95 07/23/2019 07:18 AM    LDL 82 03/01/2018 05:00 AM    HDL 34 (L) 07/23/2019 07:18 AM    HDL 29 (A) 03/01/2018 05:00 AM    TRIGLYCERIDE 127 07/23/2019 07:18 AM    TRIGLYCERIDE 209 (H) 03/01/2018 05:00 AM       Lab Results   Component Value Date/Time    SODIUM 141 03/25/2019 07:18 AM    SODIUM 136 03/02/2018 05:22 AM    POTASSIUM 4.4 03/25/2019 07:18 AM    POTASSIUM 3.4 (L) 03/02/2018 05:22 AM    CHLORIDE 107 (H) 03/25/2019 07:18 AM    CHLORIDE 108 03/02/2018 05:22 AM    CO2 20 03/25/2019 07:18 AM    CO2 19 (L) 03/02/2018 05:22 AM    GLUCOSE 97 03/25/2019 07:18 AM    GLUCOSE 87 03/02/2018 05:22 AM    BUN 16 03/25/2019 07:18 AM    BUN 10 03/02/2018 05:22 AM    CREATININE 1.15 03/25/2019 07:18 AM    CREATININE 1.11 03/02/2018 05:22 " AM    BUNCREATRAT 14 03/25/2019 07:18 AM     Lab Results   Component Value Date/Time    ALKPHOSPHAT 74 03/25/2019 07:18 AM    ALKPHOSPHAT 55 03/01/2018 05:00 AM    ASTSGOT 27 03/25/2019 07:18 AM    ASTSGOT 24 03/01/2018 05:00 AM    ALTSGPT 26 03/25/2019 07:18 AM    ALTSGPT 26 03/01/2018 05:00 AM    TBILIRUBIN 0.5 03/25/2019 07:18 AM    TBILIRUBIN 0.9 03/01/2018 05:00 AM      Lab Results   Component Value Date/Time    HBA1C 6.0 (H) 10/14/2013 12:50 AM     No results found for: TSH  Lab Results   Component Value Date/Time    FREET4 0.67 10/12/2013 07:43 PM     Lab Results   Component Value Date/Time    WBC 6.0 03/01/2018 05:00 AM    RBC 4.53 (L) 03/01/2018 05:00 AM    HEMOGLOBIN 12.8 (L) 03/01/2018 05:00 AM    HEMATOCRIT 39.2 (L) 03/01/2018 05:00 AM    MCV 86.5 03/01/2018 05:00 AM    MCH 28.3 03/01/2018 05:00 AM    MCHC 32.7 (L) 03/01/2018 05:00 AM    MPV 9.0 03/01/2018 05:00 AM    NEUTSPOLYS 62.40 03/01/2018 05:00 AM    LYMPHOCYTES 16.80 (L) 03/01/2018 05:00 AM    MONOCYTES 15.30 (H) 03/01/2018 05:00 AM    EOSINOPHILS 4.00 03/01/2018 05:00 AM    BASOPHILS 0.80 03/01/2018 05:00 AM    HYPOCHROMIA 1+ 10/16/2013 04:50 AM      IMAGING     None    ASSESMENT AND PLAN        1. LRTI (lower respiratory tract infection)  - azithromycin (ZITHROMAX) 250 MG Tab; Take 1 tabl twice daily the first day, then 1 tabl daily, PO.  Dispense: 6 Tab; Refill: 0    Advised to take abx after a meal.   Side effects of abx use discussed with a patient. Advised to stop abx and call if develop any side effect, including diarrhea.     He has history of C. difficile with amoxicillin, advised to take probiotic, OTC 3 times daily while on antibiotic    Advised   - increase fluid intake, rest    Counseling:   - Smoking:  Nonsmoker    Followup: as needed    All questions are answered.    Please note that this dictation was created using voice recognition software, and that there might be errors of juan antonio and possibly content.

## 2019-12-10 ENCOUNTER — HOSPITAL ENCOUNTER (OUTPATIENT)
Facility: MEDICAL CENTER | Age: 74
End: 2019-12-10
Attending: INTERNAL MEDICINE | Admitting: INTERNAL MEDICINE
Payer: MEDICARE

## 2019-12-17 ENCOUNTER — TELEPHONE (OUTPATIENT)
Dept: HEMATOLOGY ONCOLOGY | Facility: MEDICAL CENTER | Age: 74
End: 2019-12-17

## 2019-12-17 NOTE — TELEPHONE ENCOUNTER
Received referral to lung cancer screening program.  Chart review to assess for lung cancer screening program eligibility.   1. Age 55-77 yrs of age? Yes 74 y.o.  2. 30 pack year hx of smoking, or greater? Yes 0.60 [id number]= [id number] hx  3. Current smoker or if quit, has pt quit within last 15 yrs?Yes  Quit 2011  4. Any signs or symptoms of lung cancer? None noted  5. Previous history of lung cancer? None noted  6. Chest CT within past 12 mos.? None noted  Patient does meet eligibility criteria. [facility] scheduling notified to schedule the shared decision making visit.

## 2019-12-19 ENCOUNTER — TELEPHONE (OUTPATIENT)
Dept: HEMATOLOGY ONCOLOGY | Facility: MEDICAL CENTER | Age: 74
End: 2019-12-19

## 2019-12-19 NOTE — TELEPHONE ENCOUNTER
1st attempt to contact the patient,- left voicemail for patient requesting a return call to schedule their shared decision making appointment.   [facility]/ Medicare/ Nicotine Dependence/ Savannah Campos

## 2019-12-27 VITALS — HEIGHT: 70 IN | WEIGHT: 201.19 LBS | BODY MASS INDEX: 28.8 KG/M2

## 2019-12-27 DIAGNOSIS — Z01.810 PRE-OPERATIVE CARDIOVASCULAR EXAMINATION: ICD-10-CM

## 2019-12-27 DIAGNOSIS — Z01.812 PRE-OPERATIVE LABORATORY EXAMINATION: ICD-10-CM

## 2019-12-27 LAB
ANION GAP SERPL CALC-SCNC: 10 MMOL/L (ref 0–11.9)
BUN SERPL-MCNC: 18 MG/DL (ref 8–22)
CALCIUM SERPL-MCNC: 9.7 MG/DL (ref 8.5–10.5)
CHLORIDE SERPL-SCNC: 108 MMOL/L (ref 96–112)
CO2 SERPL-SCNC: 23 MMOL/L (ref 20–33)
CREAT SERPL-MCNC: 1.05 MG/DL (ref 0.5–1.4)
EKG IMPRESSION: NORMAL
GLUCOSE SERPL-MCNC: 84 MG/DL (ref 65–99)
POTASSIUM SERPL-SCNC: 4.1 MMOL/L (ref 3.6–5.5)
SODIUM SERPL-SCNC: 141 MMOL/L (ref 135–145)

## 2019-12-27 PROCEDURE — 36415 COLL VENOUS BLD VENIPUNCTURE: CPT

## 2019-12-27 PROCEDURE — 93010 ELECTROCARDIOGRAM REPORT: CPT | Performed by: INTERNAL MEDICINE

## 2019-12-27 PROCEDURE — 80048 BASIC METABOLIC PNL TOTAL CA: CPT

## 2019-12-27 PROCEDURE — 93005 ELECTROCARDIOGRAM TRACING: CPT

## 2019-12-27 SDOH — HEALTH STABILITY: MENTAL HEALTH: HOW OFTEN DO YOU HAVE A DRINK CONTAINING ALCOHOL?: 2-4 TIMES A MONTH

## 2019-12-27 NOTE — OR NURSING
"Preadmit appointment: \" Preparing for your Procedure information\" sheet given to patient with verbal and written instructions. Patient instructed to continue prescribed medications through the day before surgery, instructed to take the following medications the day of surgery per anesthesia protocol:  NONE.  Will take his evening dose of Metoprolol as normal.   Pt states, \"no issues with anesthesia\".  Fasting guidelines per GI's instructions.  All Pt's questions and concerns answered or addressed.  "

## 2019-12-30 NOTE — OR NURSING
Dr Burris reviewed patients medical history and EKG. Based upon information available, Dr Burris indicates that:     Interestingly enough, this patient shows evidence of atrial flutter with a variable block rate, not atrial fibrillation, which was not present on a previous EKG. He does need a follow up or at least a note from the PCP stating that this is a normal pattern for him. Otherwise this case may get canceled given this sharp difference in the EKG.     Thank you.  Eusebio Burris M.D.  [facility]    Above note faxed to Dr Cordero and notified Nini at his office. She states she did send EKG to Dr Cordero.

## 2020-01-07 ENCOUNTER — OFFICE VISIT (OUTPATIENT)
Dept: HEMATOLOGY ONCOLOGY | Facility: MEDICAL CENTER | Age: 75
End: 2020-01-07
Payer: MEDICARE

## 2020-01-07 VITALS
DIASTOLIC BLOOD PRESSURE: 70 MMHG | HEIGHT: 69 IN | OXYGEN SATURATION: 95 % | TEMPERATURE: 97.5 F | RESPIRATION RATE: 16 BRPM | BODY MASS INDEX: 29.36 KG/M2 | HEART RATE: 62 BPM | SYSTOLIC BLOOD PRESSURE: 132 MMHG | WEIGHT: 198.19 LBS

## 2020-01-07 DIAGNOSIS — Z87.891 PERSONAL HISTORY OF NICOTINE DEPENDENCE: ICD-10-CM

## 2020-01-07 ASSESSMENT — PAIN SCALES - GENERAL: PAINLEVEL: NO PAIN

## 2020-01-07 NOTE — Clinical Note
Dr. Campos -please note when I met patient today he confirmed that he only smoked less than a half a pack per day for the 50 years, giving him a 05-dgsa-flur smoking history, therefore he does not meet the requirements to proceed with the lung cancer screening program.  He did mention that he was concerned and would like to proceed with any further imaging such as a chest x-ray but would like to discuss with that you in the future.  Patient will not be enrolled in the lung cancer screening program as he does not meet the eligibility requirements.Regards,Ilene Harkins, CHUY

## 2020-01-07 NOTE — PROGRESS NOTES
"Subjective:      Papo Souza is a 74 y.o. male who presents for Lung Cancer Screening Program Prescreen (Dx: Nicotine Dependence Ref: Savannah Campos MD) for lung cancer screening shared decision making visit.           HPI    Patient seen today for initial lung cancer screening visit. Patient referred by his PCP Dr. Campos.     The patient meets eligibility criteria including age, former smoker, quit in the last 15 years, HOWEVER patient does NOT meet eligibility requirements as his pack year history of 20.     - Age - 74  - Smoking history - Patient has smoked for 50 years at an average of 0.4 ppd = 20 pack year smoking history.  - Current smoking status - Quit in Oct 2011         Allergies   Allergen Reactions   • Amoxicillin Diarrhea     Diarrhea       Current Outpatient Medications on File Prior to Visit   Medication Sig Dispense Refill   • PARoxetine (PAXIL) 10 MG Tab TAKE 1 TABLET BY MOUTH EVERY DAY 90 Tab 0   • fenofibrate (TRIGLIDE) 160 MG tablet TAKE 1 TABLET BY MOUTH EVERY DAY 90 Tab 1   • simvastatin (ZOCOR) 20 MG Tab TAKE 1 TABLET BY MOUTH EVERY DAY 90 Tab 1   • NON SPECIFIED C-NITROGLYCERIN 0.125% OINT SIG: 15, Refills: 2. Indiciation for use: Tenderness or swelling of minor hemorrhoids.  Directions for use: Apply to ravinder-rectal area twice a day, as needed. 15 Each 2   • metoprolol SR (TOPROL XL) 50 MG TABLET SR 24 HR TAKE 1 TABLET BY MOUTH EVERY DAY (Patient taking differently: every evening.) 90 Tab 2   • aspirin (ASA) 325 MG Tab Take 162.5 mg by mouth every evening.       No current facility-administered medications on file prior to visit.        ROS       Objective:     /70 (BP Location: Right arm, Patient Position: Sitting, BP Cuff Size: Adult)   Pulse 62   Temp 36.4 °C (97.5 °F) (Temporal)   Resp 16   Ht 1.753 m (5' 9\")   Wt 89.9 kg (198 lb 3.1 oz)   SpO2 95%   BMI 29.27 kg/m²      Physical Exam            Assessment/Plan:       1. Personal history of nicotine dependence   "       At initiation of appointment today it was confirmed that patient was adamant that he did not smoke more than approximately 8 cigarettes/day.  He stated over the average time of 50-year smoking he never smoked even 1/2 pack/day.  Based on those findings his pack-year history is approximately 20 and therefore does not meet the eligibility requirements to proceed with lung cancer screening.  I discussed with patient the risks and benefits of lung cancer screening and informed him of the eligibility requirements and he did verbalize understanding.      Based on our discussion, we have decided not to initiate regular lung cancer screening as he does not meet the eligibility requirements to proceed with lung cancer screening.    I will update patient's primary care provider as well.

## 2020-01-15 ENCOUNTER — OFFICE VISIT (OUTPATIENT)
Dept: CARDIOLOGY | Facility: MEDICAL CENTER | Age: 75
End: 2020-01-15
Payer: MEDICARE

## 2020-01-15 VITALS
SYSTOLIC BLOOD PRESSURE: 122 MMHG | HEIGHT: 69 IN | HEART RATE: 60 BPM | WEIGHT: 194 LBS | DIASTOLIC BLOOD PRESSURE: 72 MMHG | BODY MASS INDEX: 28.73 KG/M2 | OXYGEN SATURATION: 92 %

## 2020-01-15 DIAGNOSIS — I10 ESSENTIAL HYPERTENSION: ICD-10-CM

## 2020-01-15 DIAGNOSIS — Z79.01 CHRONIC ANTICOAGULATION: Chronic | ICD-10-CM

## 2020-01-15 DIAGNOSIS — I48.4 ATYPICAL ATRIAL FLUTTER (HCC): Chronic | ICD-10-CM

## 2020-01-15 DIAGNOSIS — I48.0 PAF (PAROXYSMAL ATRIAL FIBRILLATION) (HCC): ICD-10-CM

## 2020-01-15 DIAGNOSIS — E78.5 DYSLIPIDEMIA: Chronic | ICD-10-CM

## 2020-01-15 PROCEDURE — 99204 OFFICE O/P NEW MOD 45 MIN: CPT | Performed by: INTERNAL MEDICINE

## 2020-01-15 ASSESSMENT — ENCOUNTER SYMPTOMS
PND: 0
DIZZINESS: 0
NAUSEA: 0
CHILLS: 0
FOCAL WEAKNESS: 0
PALPITATIONS: 0
FEVER: 0
ABDOMINAL PAIN: 0
CLAUDICATION: 0
BRUISES/BLEEDS EASILY: 0
BLURRED VISION: 0
FALLS: 0
SORE THROAT: 0
COUGH: 0
WEAKNESS: 0
SHORTNESS OF BREATH: 0

## 2020-01-15 NOTE — PATIENT INSTRUCTIONS
Options for blood thinners    Warfarin plus blood testing    Eliquis 5 mg twice a day    Pradaxa 150 mg twice a day    Xarelto 20 mg once a day with food    Please look into the following diets and incorporate them into your diet    LOW SALT DIET   KEEP YOUR SODIUM EQUAL TO CALORIES AND NO MORE THAN DOUBLE THE CALORIES FOR A LOW SALT DIET    FOR TREATMENT OR PREVENTION OF CORONARY ARTERY DISEASE    Priroxanne - [facility]    Dr. South's [facility] for Reversing Heart Disease - Aj Butt's Cardiologist    [facility]    Dr Escoto - [place] over [facility] (book and documentary)      FOR TREATMENT OF BLOOD PRESSURE  DASH DIET - American Heart Association for treatment of HYPERTENSION    FOR TREATMENT OF BAD CHOLESTEROL/FATS  REDUCE PROCESSED SUGAR AS MUCH AS POSSIBLE  INCREASE WHOLE GRAINS/VEGETABLES    Lowering total cholesterol and LDL (bad) cholesterol:  - Eat leaner cuts of meat, or eliminate altogether if possible red meat, and frequently substitute fish or chicken.  - Limit saturated fat to no more than 7-10% of total calories no more than 10 g per day is recommended. Some sources of saturated fat include butter, animal fats, hydrogenated vegetable fats and oils, many desserts, whole milk dairy products.  - Replaced saturated fats with polyunsaturated fats and monounsaturated fats. Foods high in monounsaturated fat include nuts, although well, canola oil, avocados, and olives.  - Limit trans fat (processed foods) and replaced with fresh fruits and vegetables  - Recommend nonfat dairy products  - Increase substantially the amount of soluble fiber intake (legumes such as beans, fruit, whole grains).  - Consider nutritional supplements: plant sterile spreads such as Benecol, fish oil,  flaxseed oil, omega-3 acids capsules 1000 mg twice a day, or viscous fiber such as Metamucil  - Attain ideal weight and regular exercise (at least 30 minutes per day of  walking)    Lowering triglycerides:  - Reduce intake of simple sugar: Desserts, candy, pastries, honey, sodas, sugared cereals, yogurt, Gatorade, sports bars, canned fruit, smoothies, fruit juice, coffee drinks  - Reduced intake of refined starches: Refined Pasta  - Reduce or abstain from alcohol  - Increase omega-3 fatty acids: [place], Trout, Mackerel, [place]ring, [place] tuna and supplements  - Attain ideal weight and regular exercise (at least 30 minutes per day of walking)      Elevating HDL (good) cholesterol:  - Increase physical activity  - Seasoned foods with garlic and onions  - Increase omega-3 fatty acids and supplements as listed above  - Incorporating appropriate amounts of monounsaturated fats such as nuts, olive oil, canola oil, avocados, olives  - Stop smoking  - Attain ideal weight and regular exercise (at least 30 minutes per day of walking)

## 2020-01-15 NOTE — LETTER
PROCEDURE/SURGERY CLEARANCE FORM      Encounter Date: 1/15/2020    Patient: Papo Souza  YOB: 1945    CARDIOLOGIST:  Smooth Cruz M.D.    REFERRING DOCTOR:  Dr Cordero    The above patient is cleared to have the following procedure/surgery: colonoscopy and EGD                                           Additional comments: He is safe to proceed, no testing required, hold anticoagulation as necessary.    It is my pleasure to participate in the care of Mr. Souza .  Please do not hesitate to contact me with questions or concerns. [facility] is available 24/7 for consultative services at [phone] in the perioperative period.    Electronically Signed    Smooth Cruz MD PhD [facility]  Cardiologist [facility]    Please note that this dictation was created using voice recognition software. I have worked with consultants from the vendor as well as technical experts from [facility] to optimize the interface. I have made every reasonable attempt to correct obvious errors, but I expect that there are errors of grammar and possibly content I did not discover before finalizing the note.

## 2020-01-15 NOTE — PROGRESS NOTES
Chief Complaint   Patient presents with   • Atrial Fibrillation       Subjective:   Papo Souza is a 74 y.o. male who presents today for follow-up of his history of paroxysmal atrial fibrillation on different occasions recent preop EKG shows atrial flutter on metoprolol    He was unaware of the flutter he has a Fitbit and notes his resting heart rate is often in the 60s    Past Medical History:   Diagnosis Date   • Arrhythmia 2011    on metoprolol; hx of afib   • Atypical atrial flutter (HCC) seen 12/2019    • Chronic anticoagulation    • Heart burn    • High cholesterol    • Pneumonia 2018   • Renal disorder 2011    had short term kidney failure, with 3 dialysis treatments     Past Surgical History:   Procedure Laterality Date   • COLONOSCOPY N/A 9/10/2018    Procedure: COLONOSCOPY;  Surgeon: Migel Cordero M.D.;  Location: SURGERY SAME DAY [facility];  Service: Gastroenterology   • GASTROSCOPY-ENDO  10/13/2013    Performed by Oseas Madden M.D. at ENDOSCOPY [facility]   • INGUINAL HERNIA REPAIR  3/26/2013    Performed by Felipe Carter M.D. at SURGERY SAME DAY [facility] ORS   • OTHER  2011    dialysis cath insertion and removal   • COLONOSCOPY       Family History   Problem Relation Age of Onset   • No Known Problems Mother    • No Known Problems Father      Social History     Socioeconomic History   • Marital status:      Spouse name: Not on file   • Number of children: Not on file   • Years of education: Not on file   • Highest education level: Not on file   Occupational History   • Not on file   Social Needs   • Financial resource strain: Not on file   • Food insecurity:     Worry: Not on file     Inability: Not on file   • Transportation needs:     Medical: Not on file     Non-medical: Not on file   Tobacco Use   • Smoking status: Former Smoker     Packs/day: 0.60     Years: 50.00     Pack years: 30.00     Types: Cigarettes     Last attempt to quit: 10/1/2011     Years since  quittin.2   • Smokeless tobacco: Never Used   Substance and Sexual Activity   • Alcohol use: Yes     Frequency: 2-4 times a month     Comment: quit  (hx heavy use 1 yr)   • Drug use: Not Currently     Comment: History of cocaine use quit    • Sexual activity: Not Currently     Partners: Female     Comment:    Lifestyle   • Physical activity:     Days per week: Not on file     Minutes per session: Not on file   • Stress: Not on file   Relationships   • Social connections:     Talks on phone: Not on file     Gets together: Not on file     Attends Jew service: Not on file     Active member of club or organization: Not on file     Attends meetings of clubs or organizations: Not on file     Relationship status: Not on file   • Intimate partner violence:     Fear of current or ex partner: Not on file     Emotionally abused: Not on file     Physically abused: Not on file     Forced sexual activity: Not on file   Other Topics Concern   • Not on file   Social History Narrative   • Not on file     Allergies   Allergen Reactions   • Amoxicillin Diarrhea     Diarrhea       Outpatient Encounter Medications as of 1/15/2020   Medication Sig Dispense Refill   • rivaroxaban (XARELTO) 20 MG Tab tablet Take 1 Tab by mouth with dinner. 90 Tab 3   • PARoxetine (PAXIL) 10 MG Tab TAKE 1 TABLET BY MOUTH EVERY DAY 90 Tab 0   • fenofibrate (TRIGLIDE) 160 MG tablet TAKE 1 TABLET BY MOUTH EVERY DAY 90 Tab 1   • simvastatin (ZOCOR) 20 MG Tab TAKE 1 TABLET BY MOUTH EVERY DAY 90 Tab 1   • NON SPECIFIED C-NITROGLYCERIN 0.125% OINT SIG: 15, Refills: 2. Indiciation for use: Tenderness or swelling of minor hemorrhoids.  Directions for use: Apply to ravinder-rectal area twice a day, as needed. 15 Each 2   • metoprolol SR (TOPROL XL) 50 MG TABLET SR 24 HR TAKE 1 TABLET BY MOUTH EVERY DAY (Patient taking differently: every evening.) 90 Tab 2   • [DISCONTINUED] aspirin (ASA) 325 MG Tab Take 162.5 mg by mouth every evening.       No  "facility-administered encounter medications on file as of 1/15/2020.      Review of Systems   Constitutional: Negative for chills and fever.   HENT: Negative for sore throat.    Eyes: Negative for blurred vision.   Respiratory: Negative for cough and shortness of breath.    Cardiovascular: Negative for chest pain, palpitations, claudication, leg swelling and PND.   Gastrointestinal: Negative for abdominal pain and nausea.   Musculoskeletal: Negative for falls and joint pain.   Skin: Negative for rash.   Neurological: Negative for dizziness, focal weakness and weakness.   Endo/Heme/Allergies: Does not bruise/bleed easily.        Objective:   /72 (BP Location: Left arm, Patient Position: Sitting, BP Cuff Size: Adult)   Pulse 60   Ht 1.753 m (5' 9\")   Wt 88 kg (194 lb)   SpO2 92%   BMI 28.65 kg/m²     Physical Exam   Constitutional: No distress.   HENT:   Mouth/Throat: Oropharynx is clear and moist. No oropharyngeal exudate.   Eyes: No scleral icterus.   Neck: No JVD present.   Cardiovascular: Normal rate and normal heart sounds. Exam reveals no gallop and no friction rub.   No murmur heard.  Pulmonary/Chest: No respiratory distress. He has no wheezes. He has no rales.   Abdominal: Soft. Bowel sounds are normal.   Musculoskeletal:         General: No edema.   Neurological: He is alert.   Skin: No rash noted. He is not diaphoretic.   Psychiatric: He has a normal mood and affect.     We reviewed in person the most recent labs  Recent Results (from the past 4032 hour(s))   Basic Metabolic Panel    Collection Time: 12/27/19 10:28 AM   Result Value Ref Range    Sodium 141 135 - 145 mmol/L    Potassium 4.1 3.6 - 5.5 mmol/L    Chloride 108 96 - 112 mmol/L    Co2 23 20 - 33 mmol/L    Glucose 84 65 - 99 mg/dL    Bun 18 8 - 22 mg/dL    Creatinine 1.05 0.50 - 1.40 mg/dL    Calcium 9.7 8.5 - 10.5 mg/dL    Anion Gap 10.0 0.0 - 11.9   ESTIMATED GFR    Collection Time: 12/27/19 10:28 AM   Result Value Ref Range    GFR If " African American >60 >60 mL/min/1.73 m 2    GFR If Non African American >60 >60 mL/min/1.73 m 2   ECG    Collection Time: 19 11:10 AM   Result Value Ref Range    Report       [facility]    Test Date:  2019  Pt Name:    HEATHER CHEN               Department: [facility]  MRN:        [id number]                      Room:  Gender:     Male                         Technician: ARR  :        [id number]                   Requested By:VALENTÍN FANG  Order #:    [id number]                    Reading MD: Gil Andrade MD    Measurements  Intervals                                Axis  Rate:       96                           P:  KS:                                      QRS:        23  QRSD:       88                           T:          26  QT:         348  QTc:        440    Interpretive Statements  A-FLUTTER W/ PREDOM 3:1 AV BLOCK, A-RATE 277  Compared to ECG 09/10/2018 11:04:59  Sinus bradycardia no longer present  Electronically Signed On 2019 11:11:20 PST by Gil Andrade MD         Assessment:     1. Essential hypertension     2. Dyslipidemia     3. Atypical atrial flutter (HCC) seen 2019     4. PAF (paroxysmal atrial fibrillation) (HCC)     5. Chronic anticoagulation         Medical Decision Making:  Today's Assessment / Status / Plan:     It was my pleasure to meet with Mr. Chen.    For his letter this seems asymptomatic likely paroxysmal in nature as well so he can continue on metoprolol for rate control we discussed the importance of chronic anticoagulation especially with his history of prior paroxysmal atrial fibrillation on different occasions and he agrees to Xarelto    He could safely proceed with colonoscopy would need to hold his Xarelto as necessary    I will see Mr. Chen back in 1 year time and encouraged him to follow up with us over the phone or electronically using my MyChart as issues arise.    It is my pleasure to participate in the care of Mr. Chen.  Please do  not hesitate to contact me with questions or concerns.    Smooth Cruz MD PhD [facility]  Cardiologist [facility]    Please note that this dictation was created using voice recognition software. I have worked with consultants from the vendor as well as technical experts from [facility] to optimize the interface. I have made every reasonable attempt to correct obvious errors, but I expect that there are errors of grammar and possibly content I did not discover before finalizing the note.

## 2020-01-23 ENCOUNTER — HOSPITAL ENCOUNTER (OUTPATIENT)
Facility: MEDICAL CENTER | Age: 75
End: 2020-01-23
Attending: INTERNAL MEDICINE | Admitting: INTERNAL MEDICINE
Payer: MEDICARE

## 2020-01-23 DIAGNOSIS — F33.0 MILD EPISODE OF RECURRENT MAJOR DEPRESSIVE DISORDER (HCC): ICD-10-CM

## 2020-01-23 RX ORDER — PAROXETINE 10 MG/1
TABLET, FILM COATED ORAL
Qty: 90 TAB | Refills: 0 | Status: SHIPPED | OUTPATIENT
Start: 2020-01-23 | End: 2020-08-10 | Stop reason: SDUPTHER

## 2020-02-05 DIAGNOSIS — I10 ESSENTIAL HYPERTENSION: ICD-10-CM

## 2020-02-05 RX ORDER — METOPROLOL SUCCINATE 50 MG/1
50 TABLET, EXTENDED RELEASE ORAL DAILY
Qty: 90 TAB | Refills: 1 | Status: SHIPPED | OUTPATIENT
Start: 2020-02-05 | End: 2021-05-04

## 2020-02-21 LAB
ALBUMIN SERPL-MCNC: 4.6 G/DL (ref 3.7–4.7)
ALBUMIN/GLOB SERPL: 2 {RATIO} (ref 1.2–2.2)
ALP SERPL-CCNC: 102 IU/L (ref 39–117)
ALT SERPL-CCNC: 24 IU/L (ref 0–44)
AST SERPL-CCNC: 23 IU/L (ref 0–40)
BASOPHILS # BLD AUTO: 0.1 X10E3/UL (ref 0–0.2)
BASOPHILS NFR BLD AUTO: 1 %
BILIRUB SERPL-MCNC: 0.4 MG/DL (ref 0–1.2)
BUN SERPL-MCNC: 16 MG/DL (ref 8–27)
BUN/CREAT SERPL: 16 (ref 10–24)
CALCIUM SERPL-MCNC: 9.5 MG/DL (ref 8.6–10.2)
CHLORIDE SERPL-SCNC: 107 MMOL/L (ref 96–106)
CHOLEST SERPL-MCNC: 198 MG/DL (ref 100–199)
CO2 SERPL-SCNC: 20 MMOL/L (ref 20–29)
CREAT SERPL-MCNC: 1.02 MG/DL (ref 0.76–1.27)
EOSINOPHIL # BLD AUTO: 0.1 X10E3/UL (ref 0–0.4)
EOSINOPHIL NFR BLD AUTO: 2 %
ERYTHROCYTE [DISTWIDTH] IN BLOOD BY AUTOMATED COUNT: 14.3 % (ref 11.6–15.4)
GLOBULIN SER CALC-MCNC: 2.3 G/DL (ref 1.5–4.5)
GLUCOSE SERPL-MCNC: 104 MG/DL (ref 65–99)
HCT VFR BLD AUTO: 49.6 % (ref 37.5–51)
HDLC SERPL-MCNC: 38 MG/DL
HGB BLD-MCNC: 17 G/DL (ref 13–17.7)
IMM GRANULOCYTES # BLD AUTO: 0.1 X10E3/UL (ref 0–0.1)
IMM GRANULOCYTES NFR BLD AUTO: 2 %
IMMATURE CELLS  115398: NORMAL
LABORATORY COMMENT REPORT: ABNORMAL
LDLC SERPL CALC-MCNC: 106 MG/DL (ref 0–99)
LYMPHOCYTES # BLD AUTO: 1.5 X10E3/UL (ref 0.7–3.1)
LYMPHOCYTES NFR BLD AUTO: 28 %
MCH RBC QN AUTO: 29.4 PG (ref 26.6–33)
MCHC RBC AUTO-ENTMCNC: 34.3 G/DL (ref 31.5–35.7)
MCV RBC AUTO: 86 FL (ref 79–97)
MONOCYTES # BLD AUTO: 0.5 X10E3/UL (ref 0.1–0.9)
MONOCYTES NFR BLD AUTO: 10 %
MORPHOLOGY BLD-IMP: NORMAL
NEUTROPHILS # BLD AUTO: 3.1 X10E3/UL (ref 1.4–7)
NEUTROPHILS NFR BLD AUTO: 57 %
NRBC BLD AUTO-RTO: NORMAL %
PLATELET # BLD AUTO: 190 X10E3/UL (ref 150–450)
POTASSIUM SERPL-SCNC: 4.4 MMOL/L (ref 3.5–5.2)
PROT SERPL-MCNC: 6.9 G/DL (ref 6–8.5)
PSA SERPL-MCNC: 0.8 NG/ML (ref 0–4)
RBC # BLD AUTO: 5.78 X10E6/UL (ref 4.14–5.8)
SODIUM SERPL-SCNC: 142 MMOL/L (ref 134–144)
T4 FREE SERPL-MCNC: 0.94 NG/DL (ref 0.82–1.77)
TRIGL SERPL-MCNC: 271 MG/DL (ref 0–149)
TSH SERPL DL<=0.005 MIU/L-ACNC: 5.09 UIU/ML (ref 0.45–4.5)
VLDLC SERPL CALC-MCNC: 54 MG/DL (ref 5–40)
WBC # BLD AUTO: 5.3 X10E3/UL (ref 3.4–10.8)

## 2020-02-24 ENCOUNTER — OFFICE VISIT (OUTPATIENT)
Dept: MEDICAL GROUP | Facility: MEDICAL CENTER | Age: 75
End: 2020-02-24
Payer: MEDICARE

## 2020-02-24 VITALS
WEIGHT: 196.21 LBS | DIASTOLIC BLOOD PRESSURE: 60 MMHG | BODY MASS INDEX: 29.06 KG/M2 | TEMPERATURE: 98.1 F | OXYGEN SATURATION: 96 % | SYSTOLIC BLOOD PRESSURE: 106 MMHG | HEIGHT: 69 IN | RESPIRATION RATE: 16 BRPM | HEART RATE: 63 BPM

## 2020-02-24 DIAGNOSIS — K64.8 OTHER HEMORRHOIDS: ICD-10-CM

## 2020-02-24 DIAGNOSIS — E78.5 DYSLIPIDEMIA: Chronic | ICD-10-CM

## 2020-02-24 DIAGNOSIS — F33.0 MILD EPISODE OF RECURRENT MAJOR DEPRESSIVE DISORDER (HCC): ICD-10-CM

## 2020-02-24 DIAGNOSIS — R79.89 ELEVATED TSH: ICD-10-CM

## 2020-02-24 DIAGNOSIS — I48.0 PAF (PAROXYSMAL ATRIAL FIBRILLATION) (HCC): ICD-10-CM

## 2020-02-24 DIAGNOSIS — D12.6 TUBULAR ADENOMA OF COLON: ICD-10-CM

## 2020-02-24 DIAGNOSIS — R73.01 IMPAIRED FASTING BLOOD SUGAR: ICD-10-CM

## 2020-02-24 PROCEDURE — 99214 OFFICE O/P EST MOD 30 MIN: CPT | Performed by: INTERNAL MEDICINE

## 2020-02-24 RX ORDER — FENOFIBRATE 160 MG/1
TABLET ORAL
Qty: 90 TAB | Refills: 1 | Status: SHIPPED | OUTPATIENT
Start: 2020-02-24 | End: 2020-08-19

## 2020-02-24 ASSESSMENT — PATIENT HEALTH QUESTIONNAIRE - PHQ9: CLINICAL INTERPRETATION OF PHQ2 SCORE: 0

## 2020-02-24 NOTE — PROGRESS NOTES
CC:  Diagnoses of Impaired fasting blood sugar, Elevated TSH, Dyslipidemia, Other hemorrhoids, PAF (paroxysmal atrial fibrillation) (HCC), Tubular adenoma of colon, and (HCC) Mild episode of recurrent major depressive disorder were pertinent to this visit.    HISTORY OF THE PRESENT ILLNESS: Patient is a 74 y.o. male. This pleasant patient is here today to follow-up.    Feels well no new symptoms.    For his history of hemorrhoids he says he uses the nitroglycerin ointment approximately 3 times per month chronically.  He is out of this medication and requests refill.  He has tried everything over-the-counter, hydrocortisone hemorrhoidal, etc treatment in the past.  Hemorrhoids are stable.  He is also pending his colonoscopy for short interval surveillance given polyps this March.    For his preoperative evaluation for colonoscopy he was found to be in atrial flutter again.  Has since seen cardiology 1/15/2020, thought to be paroxysmal, plan to continue metoprolol for rate control and added Xarelto.  Patient is tolerating Xarelto, denies any current bleeding, falls.  No cardiopulmonary symptoms.    He does wish to go back on fenofibrate.  His labs from 2/18/2020 showed total cholesterol 198, triglycerides 271, HDL 38 .  We discussed typically triglycerides are much higher than 500 for problems of pancreatitis, etc.  He says he tolerated the fenofibrate and statin very well in the past.    Additional labs from 2/18/2020 showed impaired fasting glucose of 104.  Normal CBC, PSA, LFTs.  TSH was newly minimally elevated 5.090 with T4 0.94.  He denies constipation, excess fatigue, symptoms of hypothyroidism.    Depression is well controlled on Paxil.  His PHQ 9 score today was 0.    Allergies: Amoxicillin    Current Outpatient Medications Ordered in Epic   Medication Sig Dispense Refill   • fenofibrate (TRIGLIDE) 160 MG tablet TAKE 1 TABLET BY MOUTH EVERY DAY 90 Tab 1   • metoprolol SR (TOPROL XL) 50 MG TABLET SR 24  HR Take 1 Tab by mouth every day for 180 days. 90 Tab 1   • PARoxetine (PAXIL) 10 MG Tab TAKE 1 TABLET BY MOUTH EVERY DAY 90 Tab 0   • rivaroxaban (XARELTO) 20 MG Tab tablet Take 1 Tab by mouth with dinner. 90 Tab 3   • simvastatin (ZOCOR) 20 MG Tab TAKE 1 TABLET BY MOUTH EVERY DAY 90 Tab 1   • NON SPECIFIED C-NITROGLYCERIN 0.125% OINT SIG: 15, Refills: 2. Indiciation for use: Tenderness or swelling of minor hemorrhoids.  Directions for use: Apply to ravinder-rectal area twice a day, as needed. 15 Each 2     No current Epic-ordered facility-administered medications on file.        Past Medical History:   Diagnosis Date   • Arrhythmia     on metoprolol; hx of afib   • Atypical atrial flutter (HCC) seen 2019    • Chronic anticoagulation    • Heart burn    • High cholesterol    • Pneumonia    • Renal disorder     had short term kidney failure, with 3 dialysis treatments       Past Surgical History:   Procedure Laterality Date   • COLONOSCOPY N/A 9/10/2018    Procedure: COLONOSCOPY;  Surgeon: Migel Cordero M.D.;  Location: SURGERY SAME DAY [facility] ORS;  Service: Gastroenterology   • GASTROSCOPY-ENDO  10/13/2013    Performed by Oseas Madden M.D. at ENDOSCOPY [facility] ORS   • INGUINAL HERNIA REPAIR  3/26/2013    Performed by Felipe Carter M.D. at SURGERY SAME DAY [facility] ORS   • OTHER      dialysis cath insertion and removal   • COLONOSCOPY         Social History     Tobacco Use   • Smoking status: Former Smoker     Packs/day: 0.60     Years: 50.00     Pack years: 30.00     Types: Cigarettes     Last attempt to quit: 10/1/2011     Years since quittin.4   • Smokeless tobacco: Never Used   Substance Use Topics   • Alcohol use: Yes     Frequency: 2-4 times a month     Comment: quit  (hx heavy use 1 yr)   • Drug use: Not Currently     Comment: History of cocaine use quit        Social History     Social History Narrative   • Not on file       Family History   Problem Relation Age of  "Onset   • No Known Problems Mother    • No Known Problems Father        ROS:     - Constitutional: Negative for fever, chills    - Eyes:   Negative for blurry vision, eye pain, discharge    - ENT:  Negative for hearing changes, ear pain, ear discharge, rhinorrhea, sinus congestion, sore throat     - Respiratory: Negative for cough, sputum production, chest congestion, dyspnea, wheezing, and crackles.      - Cardiovascular: Negative for chest pain, palpitations, orthopnea, and bilateral lower extremity edema.     - Gastrointestinal: Negative for heartburn, nausea, vomiting, abdominal pain, hematochezia, melena, diarrhea, constipation, and greasy/foul-smelling stools.     - Genitourinary: Negative for dysuria    - Musculoskeletal: Negative for new myalgias, back pain, and joint pain.     - Skin: Negative for rash, itching, cyanotic skin color change.     - Neurological: Negative for vertigo    - Endo:Negative for polyuria, heat/cold intolerance, excessive thirst    - Hem/lymphatic: Negative for swollen glands    -Allergic/immun: Negative for allergic rhinitis    - Psychiatric/Behavioral: Negative for depression, suicidal/homicidal ideation and memory loss.      Exam: /60 (BP Location: Right arm, Patient Position: Sitting, BP Cuff Size: Adult)   Pulse 63   Temp 36.7 °C (98.1 °F) (Temporal)   Resp 16   Ht 1.753 m (5' 9\")   Wt 89 kg (196 lb 3.4 oz)   SpO2 96%  Body mass index is 28.98 kg/m².    General: Normal appearing. No distress.  EYES: Conjunctiva clear lids without ptosis, pupils equal  EARS: Normal shape and contour   Pulmonary: Clear to ausculation.  Normal effort. No rales or wheezing.  Cardiovascular: Regular rate and rhythm without significant murmur.   Abdomen: Soft, nontender, nondistended. Normal bowel sounds.  Neurologic: Cranial nerves grossly nonfocal  Skin: Warm and dry.  No obvious lesions.  Musculoskeletal: Normal gait. No extremity cyanosis, clubbing, or edema.  Psych: Normal mood and " affect. Alert and oriented x3. Judgment and insight is normal.      Assessment/Plan  1. Impaired fasting blood sugar  New issue, will plan to further screen for diabetes.  Stable.  - HEMOGLOBIN A1C; Future  - Basic Metabolic Panel; Future    2. Elevated TSH  New issue, does not seem to have any overt symptoms of hypothyroidism, we will follow-up labs.  Stable.  - TSH WITH REFLEX TO FT4; Future  - THYROID PEROXIDASE  (TPO) AB; Future    3. Dyslipidemia  Chronic, stable, patient would like to restart his fenofibrate which has been well-tolerated with simvastatin.  - fenofibrate (TRIGLIDE) 160 MG tablet; TAKE 1 TABLET BY MOUTH EVERY DAY  Dispense: 90 Tab; Refill: 1  - Lipid Profile; Future  - HEPATIC FUNCTION PANEL; Future    4. Other hemorrhoids  Chronic, stable, team will call [facility] pharmacy to refill his nitroglycerin.  He has tried many other treatments without success in the past.  -NITROGLYCERIN 0.125% OINT SIG: 15, Refills: 2. Indiciation for use: Tenderness or swelling of minor hemorrhoids.  Directions for use: Apply to ravinder-rectal bid prn.    5. PAF (paroxysmal atrial fibrillation) (HCC)  Stable, chronic, well-controlled continue metoprolol and Xarelto.  Follow-up cardiology annually.    6. Tubular adenoma of colon  Stable, chronic, pending interval colonoscopy in March.    7. (HCC) Mild episode of recurrent major depressive disorder  Stable, chronic currently well controlled continue Paxil 10 mg daily.    Return to clinic 3 months      Please note that this dictation was created using voice recognition software. I have made every reasonable attempt to correct obvious errors, but I expect that there are errors of grammar and possibly content that I did not discover before finalizing the note.

## 2020-03-19 DIAGNOSIS — E78.5 HYPERLIPIDEMIA, UNSPECIFIED HYPERLIPIDEMIA TYPE: ICD-10-CM

## 2020-03-19 RX ORDER — SIMVASTATIN 20 MG
TABLET ORAL
Qty: 90 TAB | Refills: 1 | Status: SHIPPED | OUTPATIENT
Start: 2020-03-19 | End: 2020-09-21

## 2020-03-24 DIAGNOSIS — K64.9 HEMORRHOIDS, UNSPECIFIED HEMORRHOID TYPE: ICD-10-CM

## 2020-05-14 DIAGNOSIS — Z01.812 PRE-OPERATIVE LABORATORY EXAMINATION: ICD-10-CM

## 2020-05-14 LAB
ALBUMIN SERPL BCP-MCNC: 4.3 G/DL (ref 3.2–4.9)
ALBUMIN/GLOB SERPL: 1.6 G/DL
ALP SERPL-CCNC: 79 U/L (ref 30–99)
ALT SERPL-CCNC: 25 U/L (ref 2–50)
ANION GAP SERPL CALC-SCNC: 14 MMOL/L (ref 7–16)
AST SERPL-CCNC: 23 U/L (ref 12–45)
BILIRUB SERPL-MCNC: 0.4 MG/DL (ref 0.1–1.5)
BUN SERPL-MCNC: 16 MG/DL (ref 8–22)
CALCIUM SERPL-MCNC: 9.3 MG/DL (ref 8.4–10.2)
CHLORIDE SERPL-SCNC: 104 MMOL/L (ref 96–112)
CO2 SERPL-SCNC: 23 MMOL/L (ref 20–33)
CREAT SERPL-MCNC: 1.05 MG/DL (ref 0.5–1.4)
ERYTHROCYTE [DISTWIDTH] IN BLOOD BY AUTOMATED COUNT: 43.2 FL (ref 35.9–50)
GLOBULIN SER CALC-MCNC: 2.7 G/DL (ref 1.9–3.5)
GLUCOSE SERPL-MCNC: 109 MG/DL (ref 65–99)
HCT VFR BLD AUTO: 45.7 % (ref 42–52)
HGB BLD-MCNC: 15.3 G/DL (ref 14–18)
MCH RBC QN AUTO: 28.9 PG (ref 27–33)
MCHC RBC AUTO-ENTMCNC: 33.5 G/DL (ref 33.7–35.3)
MCV RBC AUTO: 86.2 FL (ref 81.4–97.8)
PLATELET # BLD AUTO: 255 K/UL (ref 164–446)
PMV BLD AUTO: 9.4 FL (ref 9–12.9)
POTASSIUM SERPL-SCNC: 4 MMOL/L (ref 3.6–5.5)
PROT SERPL-MCNC: 7 G/DL (ref 6–8.2)
RBC # BLD AUTO: 5.3 M/UL (ref 4.7–6.1)
SODIUM SERPL-SCNC: 141 MMOL/L (ref 135–145)
WBC # BLD AUTO: 6.2 K/UL (ref 4.8–10.8)

## 2020-05-14 PROCEDURE — 85027 COMPLETE CBC AUTOMATED: CPT

## 2020-05-14 PROCEDURE — 36415 COLL VENOUS BLD VENIPUNCTURE: CPT

## 2020-05-14 PROCEDURE — 80053 COMPREHEN METABOLIC PANEL: CPT

## 2020-05-14 ASSESSMENT — FIBROSIS 4 INDEX: FIB4 SCORE: 1.78

## 2020-05-14 NOTE — OR NURSING
Pre admit apt: Pt. Instructed to continue regularly prescribed medications through day before surgery.  Instructed to take the following medications, the day of surgery, with a sip of water per anesthesia protocol: none    Pt will contact Dr. Cruz (cardiologist ) for instructions to stop xarelto.

## 2020-05-18 ENCOUNTER — TELEPHONE (OUTPATIENT)
Dept: CARDIOLOGY | Facility: MEDICAL CENTER | Age: 75
End: 2020-05-18

## 2020-05-18 NOTE — TELEPHONE ENCOUNTER
CW/bernardo    Pt calling to ask for xarelto hold advice in preparation for a colonoscopy on 5/26 at [facility] (Dr Cordero).    Please call Papo zarate  or cell # [phone]

## 2020-05-18 NOTE — TELEPHONE ENCOUNTER
"Per MD last OV note, \"He could safely proceed with colonoscopy would need to hold his Xarelto as necessary.\"  Upon chart review, this office has not received any clearance request regarding upcoming procedure.    Returned pt call and spoke with wife, Leda regarding findings.  Encouraged Leda to contact Dr. Cordero's office to fax a clearance request to this office.  She verbalizes understanding and states no other concerns or questions at this time.      Will await for clearance request to be received.  "

## 2020-05-20 NOTE — TELEPHONE ENCOUNTER
Received pt's clearance from [facility] for his colonoscopy    Clearance form to be placed on RN's desk by Marta guerra/ the daily mail

## 2020-05-21 ENCOUNTER — OFFICE VISIT (OUTPATIENT)
Dept: ADMISSIONS | Facility: MEDICAL CENTER | Age: 75
End: 2020-05-21
Attending: INTERNAL MEDICINE
Payer: MEDICARE

## 2020-05-21 DIAGNOSIS — Z01.812 PRE-OPERATIVE LABORATORY EXAMINATION: ICD-10-CM

## 2020-05-21 LAB — COVID ORDER STATUS COVID19: NORMAL

## 2020-05-21 PROCEDURE — C9803 HOPD COVID-19 SPEC COLLECT: HCPCS

## 2020-05-21 NOTE — TELEPHONE ENCOUNTER
Received call from [employer] (operators). [facility] requiring clearance form to be sent back to them before the end of the day.

## 2020-05-21 NOTE — TELEPHONE ENCOUNTER
Received fax from [facility] (F: [phone]) requesting:    - clearance request for upcoming procedure, Colonoscopy, on 05/26/2020.  - Xarelto hold for 2 days.    Last OV note printed and faxed to [facility] to fax number stated above, completed status.  Fax sent to scanning for reference.

## 2020-05-23 LAB
SARS-COV-2 RNA RESP QL NAA+PROBE: NOT DETECTED
SPECIMEN SOURCE: NORMAL

## 2020-05-25 ENCOUNTER — ANESTHESIA EVENT (OUTPATIENT)
Dept: SURGERY | Facility: MEDICAL CENTER | Age: 75
End: 2020-05-25
Payer: MEDICARE

## 2020-05-26 ENCOUNTER — HOSPITAL ENCOUNTER (OUTPATIENT)
Facility: MEDICAL CENTER | Age: 75
End: 2020-05-26
Attending: INTERNAL MEDICINE | Admitting: INTERNAL MEDICINE
Payer: MEDICARE

## 2020-05-26 ENCOUNTER — ANESTHESIA (OUTPATIENT)
Dept: SURGERY | Facility: MEDICAL CENTER | Age: 75
End: 2020-05-26
Payer: MEDICARE

## 2020-05-26 VITALS
WEIGHT: 190.26 LBS | OXYGEN SATURATION: 99 % | BODY MASS INDEX: 27.24 KG/M2 | DIASTOLIC BLOOD PRESSURE: 78 MMHG | RESPIRATION RATE: 18 BRPM | HEART RATE: 55 BPM | HEIGHT: 70 IN | SYSTOLIC BLOOD PRESSURE: 122 MMHG | TEMPERATURE: 97.2 F

## 2020-05-26 LAB — PATHOLOGY CONSULT NOTE: NORMAL

## 2020-05-26 PROCEDURE — A9270 NON-COVERED ITEM OR SERVICE: HCPCS | Performed by: INTERNAL MEDICINE

## 2020-05-26 PROCEDURE — 160046 HCHG PACU - 1ST 60 MINS PHASE II: Performed by: INTERNAL MEDICINE

## 2020-05-26 PROCEDURE — 160009 HCHG ANES TIME/MIN: Performed by: INTERNAL MEDICINE

## 2020-05-26 PROCEDURE — 700105 HCHG RX REV CODE 258: Performed by: INTERNAL MEDICINE

## 2020-05-26 PROCEDURE — 700111 HCHG RX REV CODE 636 W/ 250 OVERRIDE (IP): Performed by: ANESTHESIOLOGY

## 2020-05-26 PROCEDURE — 160002 HCHG RECOVERY MINUTES (STAT): Performed by: INTERNAL MEDICINE

## 2020-05-26 PROCEDURE — 160048 HCHG OR STATISTICAL LEVEL 1-5: Performed by: INTERNAL MEDICINE

## 2020-05-26 PROCEDURE — 160025 RECOVERY II MINUTES (STATS): Performed by: INTERNAL MEDICINE

## 2020-05-26 PROCEDURE — 700101 HCHG RX REV CODE 250: Performed by: ANESTHESIOLOGY

## 2020-05-26 PROCEDURE — 160203 HCHG ENDO MINUTES - 1ST 30 MINS LEVEL 4: Performed by: INTERNAL MEDICINE

## 2020-05-26 PROCEDURE — 502240 HCHG MISC OR SUPPLY RC 0272: Performed by: INTERNAL MEDICINE

## 2020-05-26 PROCEDURE — 88305 TISSUE EXAM BY PATHOLOGIST: CPT

## 2020-05-26 PROCEDURE — 160035 HCHG PACU - 1ST 60 MINS PHASE I: Performed by: INTERNAL MEDICINE

## 2020-05-26 PROCEDURE — 700102 HCHG RX REV CODE 250 W/ 637 OVERRIDE(OP): Performed by: INTERNAL MEDICINE

## 2020-05-26 PROCEDURE — 501629 HCHG TUBE, LUKI TRAP STERILE DISP: Performed by: INTERNAL MEDICINE

## 2020-05-26 RX ORDER — HALOPERIDOL 5 MG/ML
1 INJECTION INTRAMUSCULAR
Status: DISCONTINUED | OUTPATIENT
Start: 2020-05-26 | End: 2020-05-26 | Stop reason: HOSPADM

## 2020-05-26 RX ORDER — ONDANSETRON 2 MG/ML
4 INJECTION INTRAMUSCULAR; INTRAVENOUS
Status: DISCONTINUED | OUTPATIENT
Start: 2020-05-26 | End: 2020-05-26 | Stop reason: HOSPADM

## 2020-05-26 RX ORDER — LABETALOL HYDROCHLORIDE 5 MG/ML
5 INJECTION, SOLUTION INTRAVENOUS
Status: DISCONTINUED | OUTPATIENT
Start: 2020-05-26 | End: 2020-05-26 | Stop reason: HOSPADM

## 2020-05-26 RX ORDER — SODIUM CHLORIDE, SODIUM LACTATE, POTASSIUM CHLORIDE, CALCIUM CHLORIDE 600; 310; 30; 20 MG/100ML; MG/100ML; MG/100ML; MG/100ML
INJECTION, SOLUTION INTRAVENOUS CONTINUOUS
Status: DISCONTINUED | OUTPATIENT
Start: 2020-05-26 | End: 2020-05-26 | Stop reason: HOSPADM

## 2020-05-26 RX ORDER — LIDOCAINE HYDROCHLORIDE 20 MG/ML
INJECTION, SOLUTION EPIDURAL; INFILTRATION; INTRACAUDAL; PERINEURAL PRN
Status: DISCONTINUED | OUTPATIENT
Start: 2020-05-26 | End: 2020-05-26 | Stop reason: SURG

## 2020-05-26 RX ORDER — HYDRALAZINE HYDROCHLORIDE 20 MG/ML
5 INJECTION INTRAMUSCULAR; INTRAVENOUS
Status: DISCONTINUED | OUTPATIENT
Start: 2020-05-26 | End: 2020-05-26 | Stop reason: HOSPADM

## 2020-05-26 RX ORDER — DIPHENHYDRAMINE HYDROCHLORIDE 50 MG/ML
12.5 INJECTION INTRAMUSCULAR; INTRAVENOUS
Status: DISCONTINUED | OUTPATIENT
Start: 2020-05-26 | End: 2020-05-26 | Stop reason: HOSPADM

## 2020-05-26 RX ADMIN — SODIUM CHLORIDE, POTASSIUM CHLORIDE, SODIUM LACTATE AND CALCIUM CHLORIDE: 600; 310; 30; 20 INJECTION, SOLUTION INTRAVENOUS at 07:14

## 2020-05-26 RX ADMIN — POVIDONE-IODINE 15 ML: 10 SOLUTION TOPICAL at 07:14

## 2020-05-26 RX ADMIN — PROPOFOL 50 MG: 10 INJECTION, EMULSION INTRAVENOUS at 07:41

## 2020-05-26 RX ADMIN — LIDOCAINE HYDROCHLORIDE 20 MG: 20 INJECTION, SOLUTION EPIDURAL; INFILTRATION; INTRACAUDAL; PERINEURAL at 07:41

## 2020-05-26 RX ADMIN — PROPOFOL 20 MG: 10 INJECTION, EMULSION INTRAVENOUS at 07:49

## 2020-05-26 RX ADMIN — PROPOFOL 20 MG: 10 INJECTION, EMULSION INTRAVENOUS at 07:44

## 2020-05-26 ASSESSMENT — PAIN SCALES - GENERAL: PAIN_LEVEL: 0

## 2020-05-26 ASSESSMENT — FIBROSIS 4 INDEX: FIB4 SCORE: 1.3

## 2020-05-26 NOTE — OR NURSING
0806: To PACU from EUS via gurney, awake, denies pain/nausea, respirations spontaneous and non-labored. Abdo semi firm  0815: O2 d/angelita  0820: Sipping po water, passing flatus  0835: Meets criteria to transfer to Stage 2

## 2020-05-26 NOTE — OR SURGEON
Immediate Post OP Note    PreOp Diagnosis: hx polyps    PostOp Diagnosis: colon polyps X 5, diverticulosis    Procedure(s):  COLONOSCOPY - WITH POSSIBLE BIOPSY, DILATION, POLYPECTOMY, CONTROL OF HEMORRHAGE - Wound Class: Clean Contaminated    Surgeon(s):  Migel Cordero M.D.    Anesthesiologist/Type of Anesthesia:  Anesthesiologist: Anabel Casillas M.D./* No anesthesia type entered *    Surgical Staff:  Endoscopy Technician: Tamara Bowen; Roxanne Finley    Specimens removed if any:  ID Type Source Tests Collected by Time Destination   A : polyp bx x4 Tissue Colon - Transverse PATHOLOGY SPECIMEN Migel Cordero M.D. 5/26/2020  7:53 AM    B : polyp bx Tissue Colon - Sigmoid PATHOLOGY SPECIMEN Migel Cordero M.D. 5/26/2020  8:00 AM        Estimated Blood Loss: zero    Findings: as above, o/w normal to TI    Complications: none    Plan:  No ASA/NSAIDs X 2 weeks  Next colonoscopy in 2 years in hospital          5/26/2020 8:09 AM Migel Cordero M.D.

## 2020-05-26 NOTE — ANESTHESIA QCDR
2019 [facility] Clinical Data Registry (for Quality Improvement)     Postoperative nausea/vomiting risk protocol (Adult = 18 yrs and Pediatric 3-17 yrs)- (430 and 463)  General inhalation anesthetic (NOT TIVA) with PONV risk factors: No  Provision of anti-emetic therapy with at least 2 different classes of agents: N/A  Patient DID NOT receive anti-emetic therapy and reason is documented in Medical Record: N/A    Multimodal Pain Management- (477)  Non-emergent surgery AND patient age >= 18: Yes  Use of Multimodal Pain Management, two or more drugs and/or interventions, NOT including systemic opioids: Yes  Exception: Documented allergy to multiple classes of analgesics: N/A    Smoking Abstinence (404)  Patient is current smoker (cigarette, pipe, e-cig, marijuanna): No  Elective Surgery:   Abstinence instructions provided prior to day of surgery:   Patient abstained from smoking on day of surgery:     Pre-Op Beta-Blocker in Isolated CABG (44)  Isolated CABG AND patient age >= 18: No  Beta-blocker admin within 24 hours of surgical incision:   Exception:of medical reason(s) for not administering beta blocker within 24 hours prior to surgical incision (e.g., not  indicated,other medical reason):     PACU assessment of acute postoperative pain prior to Anesthesia Care End- Applies to Patients Age = 18- (ABG7)  Initial PACU pain score is which of the following: < 7/10  Patient unable to report pain score: N/A    Post-anesthetic transfer of care checklist/protocol to PACU/ICU- (426 and 427)  Upon conclusion of case, patient transferred to which of the following locations: PACU/Non-ICU  Use of transfer checklist/protocol: Yes  Exclusion: Service Performed in Patient Hospital Room (and thus did not require transfer): N/A  Unplanned admission to ICU related to anesthesia service up through end of PACU care- (MD51)  Unplanned admission to ICU (not initially anticipated at anesthesia start time): No

## 2020-05-26 NOTE — OP REPORT
DATE OF SERVICE:  05/26/2020    PROCEDURE PERFORMED:  Colonoscopy with polypectomy.    INDICATION FOR THE PROCEDURE:  History of polyps.    CONSENT:  Informed consent was obtained directly from the patient after   benefits, risks, and possible alternatives were discussed.    MEDICATIONS:  Patient received a propofol-based regimen from Dr. Casillas from   anesthesia, please see her notes for full details.    PROCEDURE DESCRIPTION:  The patient was placed in a left lateral decubitus   position and provided with supplemental oxygen via face mask.  When ready, a   digital rectal examination was performed, which was normal.  A colonoscope was   then inserted into the rectum and advanced carefully in the usual manner to   the cecum, which was identified by the presence of the ileocecal valve and   appendiceal orifice, photo documentation was obtained.    FINDINGS:  There was pandiverticulosis present.  There were multiple polyps,   with 4 polyps present in the transverse colon and 1 polyp present in the   sigmoid colon.  All these polyps ranged from 4-9 mm in size, and were fairly   broad based.  Hot snare cautery was used to remove each of these.  All polyp   tissue was retrieved.  Retroflexion in the rectum revealed grade I internal   hemorrhoids, otherwise normal.  The scope was then withdrawn after excess air   was removed from the colonic lumen.    COMPLICATIONS:  No complications during or in the immediate postoperative   period.    IMPRESSION:  1.  Five colonic polyps as described above, removed completely and retrieved.  2.  Pandiverticulosis.  3.  Grade I internal hemorrhoids.    RECOMMENDATION:  Patient will be discharged home after he meets   post-anesthesia criteria.  We will follow up on the pathology, and it is   anticipated that his next colonoscopy would be recommended in 2 years from   now, to be performed in the hospital once again.  High-fiber diet is   recommended on a daily basis.  Patient will avoid  aspirin or anti-inflammatory   medications for 2 weeks.       ____________________________________     MD ANNETTE ARRIAGA / TEMO    DD:  05/26/2020 08:13:50  DT:  05/26/2020 08:58:31    D#:  [id number]  Job#:  [id number]

## 2020-05-26 NOTE — DISCHARGE INSTRUCTIONS
ENDOSCOPY HOME CARE INSTRUCTIONS    COLONOSCOPY OR FLEXIBLE SIGMOIDOSCOPY  1. If you received a barium enema, take a mild laxative such as dulcolax, Deyanira's M.O., or Milk of Magnesia to clean out the barium.  2. Drink plenty of fluids. Eat a diet high in fiber (whole-grain breads, fresh fruit and vegetables).  3. You may notice a few drops of blood with your first bowel movement. If you develop any large amount of bleeding, black stools, a fever, or abdominal pain, call your doctor right away.  4. Call your doctor for test results in 2 week(s) if yo have not received a letter by that time.  5. Don't drive or drink alcohol for 24 hours. The medication you received will make you too drowsy.  6. No heavy lifting for 5 days   7. Additional instructions:May restart Xarelto in 1 week   Avoid ASA/NSAIDs X 2 weeks   Advance diet as tolerated   Follow Up with GI doctor as needed    You should call 911 if you develop problems with breathing or chest pain.  If any questions arise, call your doctor. If your doctor is not available, please feel free to call [phone]. You can also call the HEALTH HOTLINE open 24 hours/day, 7 days/week and speak to a nurse at [phone], or toll free [phone].    Depression / Suicide Risk    As you are discharged from this [facility] facility, it is important to learn how to keep safe from harming yourself.    Recognize the warning signs:  · Abrupt changes in personality, positive or negative- including increase in energy   · Giving away possessions  · Change in eating patterns- significant weight changes-  positive or negative  · Change in sleeping patterns- unable to sleep or sleeping all the time   · Unwillingness or inability to communicate  · Depression  · Unusual sadness, discouragement and loneliness  · Talk of wanting to die  · Neglect of personal appearance   · Rebelliousness- reckless behavior  · Withdrawal from people/activities they love  · Confusion- inability to  concentrate     If you or a loved one observes any of these behaviors or has concerns about self-harm, here's what you can do:  · Talk about it- your feelings and reasons for harming yourself  · Remove any means that you might use to hurt yourself (examples: pills, rope, extension cords, firearm)  · Get professional help from the community (Mental Health, Substance Abuse, psychological counseling)  · Do not be alone:Call your Safe Contact- someone whom you trust who will be there for you.  · Call your local CRISIS HOTLINE 869-3838 or 776-490-9918  · Call your local Children's Mobile Crisis Response Team [place] (216) 218-0591 or www.[employer]cept.io  · Call the toll free National Suicide Prevention Hotlines   · National Suicide Prevention Lifeline 978-765-AJJG (6235)  · National Hope Line Network 800-SUICIDE (154-9485)    I acknowledge receipt and understanding of these Home Care Instructions.

## 2020-05-26 NOTE — ANESTHESIA TIME REPORT
Anesthesia Start and Stop Event Times     Date Time Event    5/26/2020 0722 Ready for Procedure     0738 Anesthesia Start        Responsible Staff  05/26/20    Name Role Begin End    Anabel Casillas M.D. Anesth 0738         Preop Diagnosis (Free Text):  Pre-op Diagnosis     GALO OF COLON POLYPS        Preop Diagnosis (Codes):  Diagnosis Information     Diagnosis Code(s): Colon polyp [K63.5]        Post op Diagnosis  History of colon polyps      Premium Reason  Non-Premium    Comments:

## 2020-05-26 NOTE — ANESTHESIA POSTPROCEDURE EVALUATION
Patient: Papo Souza    Procedure Summary     Date:  05/26/20 Room / Location:   ENDOSCOPIC ULTRASOUND ROOM / SURGERY [facility]    Anesthesia Start:  0738 Anesthesia Stop:      Procedure:  COLONOSCOPY - WITH POSSIBLE BIOPSY, DILATION, POLYPECTOMY, CONTROL OF HEMORRHAGE Diagnosis:       Colon polyp      (COLON POLYP)    Surgeon:  Migel Cordero M.D. Responsible Provider:  Anabel Casillas M.D.    Anesthesia Type:  general, MAC ASA Status:  2          Final Anesthesia Type: general, MAC  Last vitals  BP   Blood Pressure : 122/78, NIBP: 117/74    Temp   36.1 °C (97 °F)    Pulse   Pulse: (!) 56   Resp   16    SpO2   96 %      Anesthesia Post Evaluation    Patient location during evaluation: PACU  Patient participation: complete - patient participated  Level of consciousness: awake and alert  Pain score: 0    Airway patency: patent  Anesthetic complications: no  Cardiovascular status: hemodynamically stable  Respiratory status: acceptable  Hydration status: euvolemic    PONV: none           Nurse Pain Score: 0 (NPRS)

## 2020-05-26 NOTE — ANESTHESIA PREPROCEDURE EVALUATION
73 yo M with h/o HTN, atypical Aflutter on anticoagulation prsenting for colonoscopy    Relevant Problems   NEURO   (+) H/O cocaine use   (+) H/O left inguinal hernia      CARDIAC   (+) Atypical atrial flutter (HCC) seen 12/2019   (+) Essential hypertension   (+) Hemorrhoids   (+) PAF (paroxysmal atrial fibrillation) (HCC)         (+) Hepatic steatosis      Other   (+) Arthritis of left shoulder region       Physical Exam    Airway   Mallampati: II  TM distance: >3 FB  Neck ROM: full       Cardiovascular - normal exam  Rhythm: regular  Rate: normal  (-) murmur     Dental - normal exam           Pulmonary - normal exam  Breath sounds clear to auscultation     Abdominal   (-) obese     Neurological - normal exam                 Anesthesia Plan    ASA 2       Plan - general and MAC       Airway plan will be natural airway        Induction: intravenous    Postoperative Plan: Postoperative administration of opioids is intended.    Pertinent diagnostic labs and testing reviewed    Informed Consent:    Anesthetic plan and risks discussed with patient.    Use of blood products discussed with: patient whom consented to blood products.

## 2020-06-03 DIAGNOSIS — K64.9 HEMORRHOIDS, UNSPECIFIED HEMORRHOID TYPE: ICD-10-CM

## 2020-06-25 ENCOUNTER — TELEPHONE (OUTPATIENT)
Dept: MEDICAL GROUP | Facility: MEDICAL CENTER | Age: 75
End: 2020-06-25

## 2020-06-25 NOTE — TELEPHONE ENCOUNTER
1. Name: Papo Souza      Call Back Number: [phone] (home)         How would the patient prefer to be contacted with a response: phone    Pt Papo called in and said he sent a MY Chart about 3 weeks ago and Pharmacy is telling him he cant have his medication. Until Dr. Campos answers there call to verify medication is approved. Please call Pharmacy to verify ASAP as he has been waiting for his medication for a while now. Also if you can call him and let him know this was done.    Thanks

## 2020-06-29 ENCOUNTER — OFFICE VISIT (OUTPATIENT)
Dept: MEDICAL GROUP | Facility: MEDICAL CENTER | Age: 75
End: 2020-06-29
Payer: MEDICARE

## 2020-06-29 VITALS
TEMPERATURE: 98.6 F | OXYGEN SATURATION: 95 % | DIASTOLIC BLOOD PRESSURE: 62 MMHG | RESPIRATION RATE: 16 BRPM | SYSTOLIC BLOOD PRESSURE: 118 MMHG | HEART RATE: 72 BPM | WEIGHT: 187.61 LBS | BODY MASS INDEX: 27.79 KG/M2 | HEIGHT: 69 IN

## 2020-06-29 DIAGNOSIS — D12.6 TUBULAR ADENOMA OF COLON: ICD-10-CM

## 2020-06-29 DIAGNOSIS — E78.5 DYSLIPIDEMIA: Chronic | ICD-10-CM

## 2020-06-29 DIAGNOSIS — R73.01 IMPAIRED FASTING BLOOD SUGAR: ICD-10-CM

## 2020-06-29 DIAGNOSIS — K64.9 HEMORRHOIDS, UNSPECIFIED HEMORRHOID TYPE: ICD-10-CM

## 2020-06-29 DIAGNOSIS — R79.89 ELEVATED TSH: ICD-10-CM

## 2020-06-29 DIAGNOSIS — I10 ESSENTIAL HYPERTENSION: ICD-10-CM

## 2020-06-29 PROCEDURE — 99214 OFFICE O/P EST MOD 30 MIN: CPT | Performed by: INTERNAL MEDICINE

## 2020-06-29 ASSESSMENT — FIBROSIS 4 INDEX: FIB4 SCORE: 1.3

## 2020-06-29 NOTE — PROGRESS NOTES
CC:  Diagnoses of Dyslipidemia, Impaired fasting blood sugar, Elevated TSH, Hemorrhoids, unspecified hemorrhoid type, Essential hypertension, and Tubular adenoma of colon were pertinent to this visit.    HISTORY OF THE PRESENT ILLNESS: Patient is a 74 y.o. male. This pleasant patient is here today for routine follow-up.      States just needs hemorrhoidal nitroglycerin refilled.  Recently followed up with GI, had colo 5/26/20 with tubular adenomas in transverse and sigmoid colon.     Labs reviewed from 5/14/20 gfr, cbc and cmp all reasonable.  Blood pressure remains well controlled.  No cardiopulmonary symptoms.  No stroke like symptoms, and continues to tolerate combination of simvastatin/fenofibrate.   Heart rate remains controlled on toprol.  No falls/bleeding on xarelto for atrial flutter.     For routine follow-up.  Allergies: Amoxicillin    Current Outpatient Medications Ordered in Epic   Medication Sig Dispense Refill   • NON SPECIFIED C-NITROGLYCERIN 0.125% OINT SIG: 15, Refills: 2. Indiciation for use: Tenderness or swelling of minor hemorrhoids.  Directions for use: Apply to ravinder-rectal area twice a day, as needed. 15 Each 0   • simvastatin (ZOCOR) 20 MG Tab TAKE 1 TABLET BY MOUTH EVERY DAY 90 Tab 1   • fenofibrate (TRIGLIDE) 160 MG tablet TAKE 1 TABLET BY MOUTH EVERY DAY 90 Tab 1   • metoprolol SR (TOPROL XL) 50 MG TABLET SR 24 HR Take 1 Tab by mouth every day for 180 days. 90 Tab 1   • PARoxetine (PAXIL) 10 MG Tab TAKE 1 TABLET BY MOUTH EVERY DAY 90 Tab 0   • rivaroxaban (XARELTO) 20 MG Tab tablet Take 1 Tab by mouth with dinner. 90 Tab 3     No current Epic-ordered facility-administered medications on file.        Past Medical History:   Diagnosis Date   • Arrhythmia 2011    on metoprolol; hx of afib   • Atypical atrial flutter (HCC) seen 12/2019    • Chronic anticoagulation    • Heart burn    • High cholesterol    • Hypertension    • Pneumonia 2018   • Renal disorder 2011    had short term kidney  failure, with 3 dialysis treatments       Past Surgical History:   Procedure Laterality Date   • PB COLONOSCOPY,DIAGNOSTIC  2020    Procedure: COLONOSCOPY - WITH POSSIBLE BIOPSY, DILATION, POLYPECTOMY, CONTROL OF HEMORRHAGE;  Surgeon: Migel Cordero M.D.;  Location: SURGERY [facility];  Service: Gastroenterology   • COLONOSCOPY N/A 9/10/2018    Procedure: COLONOSCOPY;  Surgeon: Migel Cordero M.D.;  Location: SURGERY SAME DAY [facility];  Service: Gastroenterology   • GASTROSCOPY-ENDO  10/13/2013    Performed by Oseas Madden M.D. at ENDOSCOPY [facility] ORS   • INGUINAL HERNIA REPAIR  3/26/2013    Performed by Felipe Carter M.D. at SURGERY SAME DAY [facility] ORS   • OTHER      dialysis cath insertion and removal   • COLONOSCOPY         Social History     Tobacco Use   • Smoking status: Former Smoker     Packs/day: 0.60     Years: 50.00     Pack years: 30.00     Types: Cigarettes     Last attempt to quit: 10/1/2011     Years since quittin.7   • Smokeless tobacco: Never Used   Substance Use Topics   • Alcohol use: Yes     Frequency: 2-4 times a month     Comment: quit  (hx heavy use 1 yr)   • Drug use: Not Currently     Comment: History of cocaine use quit        Social History     Social History Narrative   • Not on file       Family History   Problem Relation Age of Onset   • No Known Problems Mother    • No Known Problems Father        ROS:     - Constitutional: Negative for fever, chills, unexpected weight change, night sweats    - Eyes:   Negative for blurry vision, eye pain, discharge    - ENT:  Negative for hearing changes, ear pain, ear discharge, rhinorrhea, sinus congestion, sore throat     - Respiratory: Negative for cough, sputum production, chest congestion, dyspnea, wheezing, and crackles.      - Cardiovascular: Negative for chest pain, palpitations, orthopnea, and bilateral lower extremity edema.     - Gastrointestinal: Negative for heartburn, nausea, vomiting,  "abdominal pain    - Genitourinary: Negative for dysuria    - Musculoskeletal: Negative for myalgias, back pain, and joint pain.     - Skin: Negative for rash, itching, cyanotic skin color change.     - Neurological: Negative for vertigo    - Endo:Negative for polyuria, heat/cold intolerance, excessive thirst    - Hem/lymphatic: Negative for swollen glands    -Allergic/immun: Negative for allergic rhinitis    - Psychiatric/Behavioral: Negative for depression, suicidal/homicidal ideation and memory loss.      Exam: /62 (BP Location: Right arm, Patient Position: Sitting, BP Cuff Size: Adult)   Pulse 72   Temp 37 °C (98.6 °F) (Temporal)   Resp 16   Ht 1.753 m (5' 9\")   Wt 85.1 kg (187 lb 9.8 oz)   SpO2 95%  Body mass index is 27.71 kg/m².    General: Normal appearing. No distress.  EYES: Conjunctiva clear lids without ptosis, pupils equal  EARS: Normal shape and contour , TM and canals clear b/l  Pulmonary: Clear to ausculation.  Normal effort. No rales or wheezing.  Cardiovascular: Regular rate and rhythm without significant murmur.   Abdomen: Soft, nontender, nondistended. Normal bowel sounds.  Neurologic: Cranial nerves grossly nonfocal  Skin: Warm and dry.  No obvious lesions.  Musculoskeletal: Normal gait. No extremity cyanosis, clubbing, or edema.  Psych: Normal mood and affect. Alert and oriented x3. Judgment and insight is normal.      Assessment/Plan  1. Dyslipidemia  Stable, chronic, continue simvastatin and fenofibrate.    2. Impaired fasting blood sugar  Stable, chronic, continue diet/exercise and reevaluate periodically.    3. Elevated TSH  Stable, plan to recheck interval change/resolution.  Patient feels euthyroid.    4. Hemorrhoids, unspecified hemorrhoid type  Stable, chronic, continue current therapy.  - Nitroglycerin refilled (paper prescription)     5. Essential hypertension  Stable, chronic, continue metoprolol 50 mg daily for rate control.    6. Tubular adenoma of colon  Stable, " chronic, patient had recent colonoscopy and indicates he was told to have repeat in 2 to 3 years.      rtc 3mo      Please note that this dictation was created using voice recognition software. I have made every reasonable attempt to correct obvious errors, but I expect that there are errors of grammar and possibly content that I did not discover before finalizing the note.

## 2020-07-02 LAB
ALBUMIN SERPL-MCNC: 4.6 G/DL (ref 3.7–4.7)
ALP SERPL-CCNC: 78 IU/L (ref 39–117)
ALT SERPL-CCNC: 21 IU/L (ref 0–44)
AST SERPL-CCNC: 22 IU/L (ref 0–40)
BILIRUB DIRECT SERPL-MCNC: 0.18 MG/DL (ref 0–0.4)
BILIRUB SERPL-MCNC: 0.6 MG/DL (ref 0–1.2)
BUN SERPL-MCNC: 20 MG/DL (ref 8–27)
BUN/CREAT SERPL: 17 (ref 10–24)
CALCIUM SERPL-MCNC: 9.9 MG/DL (ref 8.6–10.2)
CHLORIDE SERPL-SCNC: 108 MMOL/L (ref 96–106)
CHOLEST SERPL-MCNC: 164 MG/DL (ref 100–199)
CO2 SERPL-SCNC: 20 MMOL/L (ref 20–29)
CREAT SERPL-MCNC: 1.18 MG/DL (ref 0.76–1.27)
GLUCOSE SERPL-MCNC: 105 MG/DL (ref 65–99)
HBA1C MFR BLD: 5.8 % (ref 4.8–5.6)
HDLC SERPL-MCNC: 39 MG/DL
LABORATORY COMMENT REPORT: ABNORMAL
LDLC SERPL CALC-MCNC: 98 MG/DL (ref 0–99)
POTASSIUM SERPL-SCNC: 4.3 MMOL/L (ref 3.5–5.2)
PROT SERPL-MCNC: 6.8 G/DL (ref 6–8.5)
SODIUM SERPL-SCNC: 142 MMOL/L (ref 134–144)
THYROPEROXIDASE AB SERPL-ACNC: 11 IU/ML (ref 0–34)
TRIGL SERPL-MCNC: 136 MG/DL (ref 0–149)
TSH SERPL DL<=0.005 MIU/L-ACNC: 2.15 UIU/ML (ref 0.45–4.5)
VLDLC SERPL CALC-MCNC: 27 MG/DL (ref 5–40)

## 2020-07-23 ENCOUNTER — OFFICE VISIT (OUTPATIENT)
Dept: CARDIOLOGY | Facility: MEDICAL CENTER | Age: 75
End: 2020-07-23
Payer: MEDICARE

## 2020-07-23 VITALS
HEIGHT: 70 IN | OXYGEN SATURATION: 98 % | BODY MASS INDEX: 27.06 KG/M2 | HEART RATE: 70 BPM | SYSTOLIC BLOOD PRESSURE: 126 MMHG | RESPIRATION RATE: 18 BRPM | DIASTOLIC BLOOD PRESSURE: 74 MMHG | WEIGHT: 189 LBS

## 2020-07-23 DIAGNOSIS — I10 ESSENTIAL HYPERTENSION: ICD-10-CM

## 2020-07-23 DIAGNOSIS — I48.0 PAF (PAROXYSMAL ATRIAL FIBRILLATION) (HCC): ICD-10-CM

## 2020-07-23 DIAGNOSIS — Z79.01 CHRONIC ANTICOAGULATION: Chronic | ICD-10-CM

## 2020-07-23 DIAGNOSIS — I48.4 ATYPICAL ATRIAL FLUTTER (HCC): Chronic | ICD-10-CM

## 2020-07-23 DIAGNOSIS — E78.5 DYSLIPIDEMIA: Chronic | ICD-10-CM

## 2020-07-23 PROCEDURE — 99214 OFFICE O/P EST MOD 30 MIN: CPT | Performed by: INTERNAL MEDICINE

## 2020-07-23 ASSESSMENT — ENCOUNTER SYMPTOMS
DIZZINESS: 0
FOCAL WEAKNESS: 0
FEVER: 0
WEAKNESS: 0
BLURRED VISION: 0
ABDOMINAL PAIN: 0
SHORTNESS OF BREATH: 0
COUGH: 0
NAUSEA: 0
CHILLS: 0
PALPITATIONS: 0
SORE THROAT: 0
PND: 0
BRUISES/BLEEDS EASILY: 0
CLAUDICATION: 0
FALLS: 0

## 2020-07-23 ASSESSMENT — FIBROSIS 4 INDEX: FIB4 SCORE: 1.36

## 2020-07-23 NOTE — PROGRESS NOTES
Chief Complaint   Patient presents with   • Follow-Up     HTN       Subjective:   Roland Souza is a 74 y.o. male who presents today for follow-up of his history of atrial fibrillation atrial flutter    He underwent a colonoscopy in May he was in sinus rhythm at that time he stopped his anticoagulation well    Recent labs show good control of his cholesterol his triglycerides he is been on fenofibrate long-term    Past Medical History:   Diagnosis Date   • Arrhythmia 2011    on metoprolol; hx of afib   • Atypical atrial flutter (HCC) seen 12/2019    • Chronic anticoagulation    • Heart burn    • High cholesterol    • Hypertension    • Pneumonia 2018   • Renal disorder 2011    had short term kidney failure, with 3 dialysis treatments     Past Surgical History:   Procedure Laterality Date   • PB COLONOSCOPY,DIAGNOSTIC  5/26/2020    Procedure: COLONOSCOPY - WITH POSSIBLE BIOPSY, DILATION, POLYPECTOMY, CONTROL OF HEMORRHAGE;  Surgeon: Migel Cordero M.D.;  Location: SURGERY [facility];  Service: Gastroenterology   • COLONOSCOPY N/A 9/10/2018    Procedure: COLONOSCOPY;  Surgeon: Migel Cordero M.D.;  Location: SURGERY SAME DAY [facility];  Service: Gastroenterology   • GASTROSCOPY-ENDO  10/13/2013    Performed by Oseas Madden M.D. at ENDOSCOPY [facility]   • INGUINAL HERNIA REPAIR  3/26/2013    Performed by Feilpe Carter M.D. at SURGERY SAME DAY [facility] ORS   • OTHER  2011    dialysis cath insertion and removal   • COLONOSCOPY       Family History   Problem Relation Age of Onset   • No Known Problems Mother    • No Known Problems Father      Social History     Socioeconomic History   • Marital status:      Spouse name: Not on file   • Number of children: Not on file   • Years of education: Not on file   • Highest education level: Not on file   Occupational History   • Not on file   Social Needs   • Financial resource strain: Not on file   • Food insecurity     Worry: Not on file      Inability: Not on file   • Transportation needs     Medical: Not on file     Non-medical: Not on file   Tobacco Use   • Smoking status: Former Smoker     Packs/day: 0.60     Years: 50.00     Pack years: 30.00     Types: Cigarettes     Last attempt to quit: 10/1/2011     Years since quittin.8   • Smokeless tobacco: Never Used   Substance and Sexual Activity   • Alcohol use: Yes     Frequency: 2-4 times a month     Comment: quit  (hx heavy use 1 yr)   • Drug use: Not Currently     Comment: History of cocaine use quit    • Sexual activity: Not Currently     Partners: Female     Comment:    Lifestyle   • Physical activity     Days per week: Not on file     Minutes per session: Not on file   • Stress: Not on file   Relationships   • Social connections     Talks on phone: Not on file     Gets together: Not on file     Attends Judaism service: Not on file     Active member of club or organization: Not on file     Attends meetings of clubs or organizations: Not on file     Relationship status: Not on file   • Intimate partner violence     Fear of current or ex partner: Not on file     Emotionally abused: Not on file     Physically abused: Not on file     Forced sexual activity: Not on file   Other Topics Concern   • Not on file   Social History Narrative   • Not on file     Allergies   Allergen Reactions   • Amoxicillin Diarrhea     Diarrhea       Outpatient Encounter Medications as of 2020   Medication Sig Dispense Refill   • NON SPECIFIED C-NITROGLYCERIN 0.125% OINT SIG: 15, Refills: 2. Indiciation for use: Tenderness or swelling of minor hemorrhoids.  Directions for use: Apply to ravinder-rectal area twice a day, as needed. 15 Each 0   • simvastatin (ZOCOR) 20 MG Tab TAKE 1 TABLET BY MOUTH EVERY DAY 90 Tab 1   • fenofibrate (TRIGLIDE) 160 MG tablet TAKE 1 TABLET BY MOUTH EVERY DAY 90 Tab 1   • metoprolol SR (TOPROL XL) 50 MG TABLET SR 24 HR Take 1 Tab by mouth every day for 180 days. 90 Tab 1   •  "PARoxetine (PAXIL) 10 MG Tab TAKE 1 TABLET BY MOUTH EVERY DAY 90 Tab 0   • rivaroxaban (XARELTO) 20 MG Tab tablet Take 1 Tab by mouth with dinner. 90 Tab 3     No facility-administered encounter medications on file as of 7/23/2020.      Review of Systems   Constitutional: Negative for chills and fever.   HENT: Negative for sore throat.    Eyes: Negative for blurred vision.   Respiratory: Negative for cough and shortness of breath.    Cardiovascular: Negative for chest pain, palpitations, claudication, leg swelling and PND.   Gastrointestinal: Negative for abdominal pain and nausea.   Musculoskeletal: Negative for falls and joint pain.   Skin: Negative for rash.   Neurological: Negative for dizziness, focal weakness and weakness.   Endo/Heme/Allergies: Does not bruise/bleed easily.        Objective:   /74 (BP Location: Left arm, Patient Position: Sitting, BP Cuff Size: Adult)   Pulse 70   Resp 18   Ht 1.778 m (5' 10\")   Wt 85.7 kg (189 lb)   SpO2 98%   BMI 27.12 kg/m²     Physical Exam   Constitutional: No distress.   HENT:   Mouth/Throat: Oropharynx is clear and moist.   Eyes: No scleral icterus.   Neck: No JVD present.   Cardiovascular: Normal rate and normal heart sounds. Exam reveals no gallop and no friction rub.   No murmur heard.  Pulmonary/Chest: No respiratory distress. He has no wheezes. He has no rales.   Abdominal: Soft. Bowel sounds are normal.   Musculoskeletal:         General: No edema.   Neurological: He is alert.   Skin: No rash noted. He is not diaphoretic.   Psychiatric: He has a normal mood and affect.     We reviewed in person the most recent labs  Recent Results (from the past 4032 hour(s))   CBC WITH DIFFERENTIAL    Collection Time: 02/19/20  4:18 AM   Result Value Ref Range    WBC 5.3 3.4 - 10.8 x10E3/uL    RBC 5.78 4.14 - 5.80 x10E6/uL    Hemoglobin 17.0 13.0 - 17.7 g/dL    Hematocrit 49.6 37.5 - 51.0 %    MCV 86 79 - 97 fL    MCH 29.4 26.6 - 33.0 pg    MCHC 34.3 31.5 - 35.7 g/dL "    RDW 14.3 11.6 - 15.4 %    Platelet Count 190 150 - 450 x10E3/uL    Neutrophils-Polys 57 Not Estab. %    Lymphocytes 28 Not Estab. %    Monocytes 10 Not Estab. %    Eosinophils 2 Not Estab. %    Basophils 1 Not Estab. %    Immature Cells CANCELED     Neutrophils (Absolute) 3.1 1.4 - 7.0 x10E3/uL    Lymphs (Absolute) 1.5 0.7 - 3.1 x10E3/uL    Monos (Absolute) 0.5 0.1 - 0.9 x10E3/uL    Eos (Absolute) 0.1 0.0 - 0.4 x10E3/uL    Baso (Absolute) 0.1 0.0 - 0.2 x10E3/uL    Immature Granulocytes 2 Not Estab. %    Immature Granulocytes (abs) 0.1 0.0 - 0.1 x10E3/uL    Nucleated RBC CANCELED     Comments-Diff CANCELED    Comp Metabolic Panel    Collection Time: 02/19/20  4:18 AM   Result Value Ref Range    Glucose 104 (H) 65 - 99 mg/dL    Bun 16 8 - 27 mg/dL    Creatinine 1.02 0.76 - 1.27 mg/dL    GFR If Non  73 >59 mL/min/1.73    GFR If  84 >59 mL/min/1.73    Bun-Creatinine Ratio 16 10 - 24    Sodium 142 134 - 144 mmol/L    Potassium 4.4 3.5 - 5.2 mmol/L    Chloride 107 (H) 96 - 106 mmol/L    Co2 20 20 - 29 mmol/L    Calcium 9.5 8.6 - 10.2 mg/dL    Total Protein 6.9 6.0 - 8.5 g/dL    Albumin 4.6 3.7 - 4.7 g/dL    Globulin 2.3 1.5 - 4.5 g/dL    A-G Ratio 2.0 1.2 - 2.2    Total Bilirubin 0.4 0.0 - 1.2 mg/dL    Alkaline Phosphatase 102 39 - 117 IU/L    AST(SGOT) 23 0 - 40 IU/L    ALT(SGPT) 24 0 - 44 IU/L   LIPID PANEL    Collection Time: 02/19/20  4:18 AM   Result Value Ref Range    Cholesterol,Tot 198 100 - 199 mg/dL    Triglycerides 271 (H) 0 - 149 mg/dL    HDL 38 (L) >39 mg/dL    VLDL Cholesterol Calc 54 (H) 5 - 40 mg/dL     (H) 0 - 99 mg/dL    Comment: CANCELED    PROSTATE SPECIFIC AG    Collection Time: 02/19/20  4:18 AM   Result Value Ref Range    Prostatic Specific Antigen Tot 0.8 0.0 - 4.0 ng/mL   TSH RFLX TO T4F    Collection Time: 02/19/20  4:18 AM   Result Value Ref Range    TSH 5.090 (H) 0.450 - 4.500 uIU/mL   T4F    Collection Time: 02/19/20  4:18 AM   Result Value Ref Range     Free T-4 0.94 0.82 - 1.77 ng/dL   Comp Metabolic Panel    Collection Time: 05/14/20  2:54 PM   Result Value Ref Range    Sodium 141 135 - 145 mmol/L    Potassium 4.0 3.6 - 5.5 mmol/L    Chloride 104 96 - 112 mmol/L    Co2 23 20 - 33 mmol/L    Anion Gap 14.0 7.0 - 16.0    Glucose 109 (H) 65 - 99 mg/dL    Bun 16 8 - 22 mg/dL    Creatinine 1.05 0.50 - 1.40 mg/dL    Calcium 9.3 8.4 - 10.2 mg/dL    AST(SGOT) 23 12 - 45 U/L    ALT(SGPT) 25 2 - 50 U/L    Alkaline Phosphatase 79 30 - 99 U/L    Total Bilirubin 0.4 0.1 - 1.5 mg/dL    Albumin 4.3 3.2 - 4.9 g/dL    Total Protein 7.0 6.0 - 8.2 g/dL    Globulin 2.7 1.9 - 3.5 g/dL    A-G Ratio 1.6 g/dL   CBC Without Differential    Collection Time: 05/14/20  2:54 PM   Result Value Ref Range    WBC 6.2 4.8 - 10.8 K/uL    RBC 5.30 4.70 - 6.10 M/uL    Hemoglobin 15.3 14.0 - 18.0 g/dL    Hematocrit 45.7 42.0 - 52.0 %    MCV 86.2 81.4 - 97.8 fL    MCH 28.9 27.0 - 33.0 pg    MCHC 33.5 (L) 33.7 - 35.3 g/dL    RDW 43.2 35.9 - 50.0 fL    Platelet Count 255 164 - 446 K/uL    MPV 9.4 9.0 - 12.9 fL   ESTIMATED GFR    Collection Time: 05/14/20  2:54 PM   Result Value Ref Range    GFR If African American >60 >60 mL/min/1.73 m 2    GFR If Non African American >60 >60 mL/min/1.73 m 2   COVID/SARS CoV-2    Collection Time: 05/21/20  8:42 AM    Specimen: Nasopharyngeal; Respirate   Result Value Ref Range    COVID Order Status PREAD-Recvd    2019-nCoV RNA ([facility])    Collection Time: 05/21/20  8:42 AM   Result Value Ref Range    2019-nCoV Source NP Swab     2019-nCoV RNA Interp Not detected    Histology Request    Collection Time: 05/26/20  9:07 AM   Result Value Ref Range    Pathology Request Sent to Histo    BASIC METABOLIC PANEL (8)    Collection Time: 07/01/20  5:34 AM   Result Value Ref Range    Glucose 105 (H) 65 - 99 mg/dL    Bun 20 8 - 27 mg/dL    Creatinine 1.18 0.76 - 1.27 mg/dL    GFR If Non  61 >59 mL/min/1.73    GFR If  71 >59 mL/min/1.73     Bun-Creatinine Ratio 17 10 - 24    Sodium 142 134 - 144 mmol/L    Potassium 4.3 3.5 - 5.2 mmol/L    Chloride 108 (H) 96 - 106 mmol/L    Co2 20 20 - 29 mmol/L    Calcium 9.9 8.6 - 10.2 mg/dL   LIPID PANEL    Collection Time: 07/01/20  5:34 AM   Result Value Ref Range    Cholesterol,Tot 164 100 - 199 mg/dL    Triglycerides 136 0 - 149 mg/dL    HDL 39 (L) >39 mg/dL    VLDL Cholesterol Calc 27 5 - 40 mg/dL    LDL 98 0 - 99 mg/dL    Comment: CANCELED    HEPATIC FUNCTION PANEL    Collection Time: 07/01/20  5:34 AM   Result Value Ref Range    Total Protein 6.8 6.0 - 8.5 g/dL    Albumin 4.6 3.7 - 4.7 g/dL    Total Bilirubin 0.6 0.0 - 1.2 mg/dL    Direct Bilirubin 0.18 0.00 - 0.40 mg/dL    Alkaline Phosphatase 78 39 - 117 IU/L    AST(SGOT) 22 0 - 40 IU/L    ALT(SGPT) 21 0 - 44 IU/L   HEMOGLOBIN A1C    Collection Time: 07/01/20  5:34 AM   Result Value Ref Range    Glycohemoglobin 5.8 (H) 4.8 - 5.6 %   TSH RFLX TO T4F    Collection Time: 07/01/20  5:34 AM   Result Value Ref Range    TSH 2.150 0.450 - 4.500 uIU/mL   THYROID PEROXIDASE  (TPO) AB    Collection Time: 07/01/20  5:34 AM   Result Value Ref Range    Microsomal -Tpo- Abs 11 0 - 34 IU/mL       Assessment:     1. PAF (paroxysmal atrial fibrillation) (HCC)     2. Atypical atrial flutter (HCC) seen 12/2019     3. Chronic anticoagulation     4. Essential hypertension     5. Dyslipidemia  Lipid Profile       Medical Decision Making:  Today's Assessment / Status / Plan:     It was my pleasure to meet with Mr. Souza.    Blood pressure is well controlled.  We specifically assessed the labs on hypertension treatment    He has paroxysmal atrial fibrillation paroxysmal atrial flutter most recent monitor in May showed sinus rhythm    He will stop his fenofibrate as he may have adjusted his diet so that triglycerides are no longer an issue we will follow-up on the lipids restart fenofibrate his triglycerides were over 600    I will see Mr. Souza back in 1 year time and encouraged  him to follow up with us over the phone or electronically using my MyChart as issues arise.    It is my pleasure to participate in the care of Mr. Souza.  Please do not hesitate to contact me with questions or concerns.    Smooth Cruz MD PhD [facility]  Cardiologist [facility]    Please note that this dictation was created using voice recognition software. There may be errors I did not discover before finalizing the note.

## 2020-07-23 NOTE — PATIENT INSTRUCTIONS
Stop fenfibrate and we can recheck triglycerides      Work on at least 1.5 hours a week of moderate exercise (typical brisk walking or similar activity)    Please look into the following diets and incorporate them into your diet    LOW SALT DIET   KEEP YOUR SODIUM EQUAL TO CALORIES AND NO MORE THAN DOUBLE THE CALORIES FOR A LOW SALT DIET    Cardiosmart.org - great resource for American College of Cardiology on heart disease prevention and treatment      FOR TREATMENT OR PREVENTION OF CORONARY ARTERY DISEASE  These three programs are approved by Medicare/Insurers for those with heart disease  [place] [facility]  Dr. [facility] Program for Reversing Heart Disease - Aj Butt's Cardiologist vegetarian-based  [facility] - [place]-based mind-body Program    This is a commonly referenced Program  Dr Escoto - [place] [facility] [employer] (book and documentary) - vegetarian-based      FOR TREATMENT OF BLOOD PRESSURE  DASH DIET - American Heart Association for treatment of HYPERTENSION    FOR TREATMENT OF BAD CHOLESTEROL/FATS  REDUCE PROCESSED SUGAR AS MUCH AS POSSIBLE  INCREASE WHOLE GRAINS/VEGETABLES    Lowering total cholesterol and LDL (bad) cholesterol:  - Eat leaner cuts of meat, or eliminate altogether if possible red meat, and frequently substitute fish or chicken.  - Limit saturated fat to no more than 7-10% of total calories no more than 10 g per day is recommended. Some sources of saturated fat include butter, animal fats, hydrogenated vegetable fats and oils, many desserts, whole milk dairy products.  - Replaced saturated fats with polyunsaturated fats and monounsaturated fats. Foods high in monounsaturated fat include nuts, although well, canola oil, avocados, and olives.  - Limit trans fat (processed foods) and replaced with fresh fruits and vegetables  - Recommend nonfat dairy products  - Increase substantially the amount of soluble fiber intake (legumes  such as beans, fruit, whole grains).  - Consider nutritional supplements: plant sterile spreads such as Benecol, fish oil,  flaxseed oil, omega-3 acids capsules 1000 mg twice a day, or viscous fiber such as Metamucil  - Attain ideal weight and regular exercise (at least 30 minutes per day of walking)    Lowering triglycerides:  - Reduce intake of simple sugar: Desserts, candy, pastries, honey, sodas, sugared cereals, yogurt, Gatorade, sports bars, canned fruit, smoothies, fruit juice, coffee drinks  - Reduced intake of refined starches: Refined Pasta  - Reduce or abstain from alcohol  - Increase omega-3 fatty acids: [place], Trout, Mackerel, Herring, [place] tuna and supplements  - Attain ideal weight and regular exercise (at least 30 minutes per day of walking)      Elevating HDL (good) cholesterol:  - Increase physical activity  - Seasoned foods with garlic and onions  - Increase omega-3 fatty acids and supplements as listed above  - Incorporating appropriate amounts of monounsaturated fats such as nuts, olive oil, canola oil, avocados, olives  - Stop smoking  - Attain ideal weight and regular exercise (at least 30 minutes per day of walking)

## 2020-08-10 DIAGNOSIS — F33.0 MILD EPISODE OF RECURRENT MAJOR DEPRESSIVE DISORDER (HCC): ICD-10-CM

## 2020-08-11 DIAGNOSIS — F33.0 MILD EPISODE OF RECURRENT MAJOR DEPRESSIVE DISORDER (HCC): ICD-10-CM

## 2020-08-11 RX ORDER — PAROXETINE 10 MG/1
10 TABLET, FILM COATED ORAL
Qty: 90 TAB | Refills: 0 | OUTPATIENT
Start: 2020-08-11

## 2020-08-11 RX ORDER — PAROXETINE 10 MG/1
10 TABLET, FILM COATED ORAL
Qty: 90 TAB | Refills: 0 | Status: SHIPPED | OUTPATIENT
Start: 2020-08-11 | End: 2020-09-29 | Stop reason: SDUPTHER

## 2020-08-16 DIAGNOSIS — E78.5 DYSLIPIDEMIA: Chronic | ICD-10-CM

## 2020-08-19 RX ORDER — FENOFIBRATE 160 MG/1
TABLET ORAL
Qty: 90 TAB | Refills: 1 | Status: SHIPPED | OUTPATIENT
Start: 2020-08-19 | End: 2020-09-29

## 2020-08-19 NOTE — TELEPHONE ENCOUNTER
Received request via: Pharmacy    Was the patient seen in the last year in this department? Yes    Does the patient have an active prescription (recently filled or refills available) for medication(s) requested? No       Requested Prescriptions     Pending Prescriptions Disp Refills   • fenofibrate (TRIGLIDE) 160 MG tablet [Pharmacy Med Name: FENOFIBRATE 160 MG TABLET] 90 Tab 1     Sig: TAKE 1 TABLET BY MOUTH EVERY DAY

## 2020-08-31 ENCOUNTER — TELEPHONE (OUTPATIENT)
Dept: MEDICAL GROUP | Facility: MEDICAL CENTER | Age: 75
End: 2020-08-31

## 2020-09-01 NOTE — TELEPHONE ENCOUNTER
1. Caller Name: Papo Souza                          Call Back Number: [phone]        How would the patient prefer to be contacted with a response: Phone call OK to leave a detailed message    Pt called, Wanted to get tested for COVID-19. Asked pt if he has been experiencing any symptoms or has been around anyone who has been diagnosed with COVID-19. Pt declined on both. Advised pt it is not recommended to have the testing done unless he is symptomatic or exposed, but to let us know if he does once he is. Pt understood.    PJ Sempel  Med Ass't

## 2020-09-14 DIAGNOSIS — E78.5 HYPERLIPIDEMIA, UNSPECIFIED HYPERLIPIDEMIA TYPE: ICD-10-CM

## 2020-09-18 NOTE — TELEPHONE ENCOUNTER
Received request via: Pharmacy    Was the patient seen in the last year in this department? Yes    Does the patient have an active prescription (recently filled or refills available) for medication(s) requested? Yes, runs through tomorrow (9/19)

## 2020-09-21 RX ORDER — SIMVASTATIN 20 MG
TABLET ORAL
Qty: 90 TAB | Refills: 1 | Status: SHIPPED | OUTPATIENT
Start: 2020-09-21 | End: 2021-01-05

## 2020-09-29 ENCOUNTER — OFFICE VISIT (OUTPATIENT)
Dept: MEDICAL GROUP | Facility: MEDICAL CENTER | Age: 75
End: 2020-09-29
Payer: MEDICARE

## 2020-09-29 VITALS
BODY MASS INDEX: 28.08 KG/M2 | HEIGHT: 69 IN | SYSTOLIC BLOOD PRESSURE: 116 MMHG | TEMPERATURE: 98.1 F | DIASTOLIC BLOOD PRESSURE: 60 MMHG | OXYGEN SATURATION: 95 % | WEIGHT: 189.6 LBS | HEART RATE: 82 BPM | RESPIRATION RATE: 16 BRPM

## 2020-09-29 DIAGNOSIS — G47.09 OTHER INSOMNIA: ICD-10-CM

## 2020-09-29 DIAGNOSIS — Z23 NEED FOR VACCINATION: ICD-10-CM

## 2020-09-29 DIAGNOSIS — E78.5 DYSLIPIDEMIA: Chronic | ICD-10-CM

## 2020-09-29 DIAGNOSIS — F33.0 MILD EPISODE OF RECURRENT MAJOR DEPRESSIVE DISORDER (HCC): ICD-10-CM

## 2020-09-29 PROCEDURE — 90750 HZV VACC RECOMBINANT IM: CPT | Performed by: INTERNAL MEDICINE

## 2020-09-29 PROCEDURE — 90471 IMMUNIZATION ADMIN: CPT | Performed by: INTERNAL MEDICINE

## 2020-09-29 PROCEDURE — 99214 OFFICE O/P EST MOD 30 MIN: CPT | Mod: 25 | Performed by: INTERNAL MEDICINE

## 2020-09-29 RX ORDER — PAROXETINE 10 MG/1
10 TABLET, FILM COATED ORAL
Qty: 90 TAB | Refills: 3 | Status: SHIPPED
Start: 2020-09-29 | End: 2021-11-18

## 2020-09-29 RX ORDER — TRAZODONE HYDROCHLORIDE 50 MG/1
50 TABLET ORAL NIGHTLY PRN
Qty: 30 TAB | Refills: 3 | Status: SHIPPED
Start: 2020-09-29 | End: 2021-01-26

## 2020-09-29 ASSESSMENT — FIBROSIS 4 INDEX: FIB4 SCORE: 1.37

## 2020-09-29 ASSESSMENT — PATIENT HEALTH QUESTIONNAIRE - PHQ9: CLINICAL INTERPRETATION OF PHQ2 SCORE: 0

## 2020-09-29 NOTE — PROGRESS NOTES
CC:  Diagnoses of Mild episode of recurrent major depressive disorder (HCC), Other insomnia, Need for vaccination, and Dyslipidemia were pertinent to this visit.    HISTORY OF THE PRESENT ILLNESS: Patient is a 75 y.o. male. This pleasant patient is here today to f/u.    Here for routine follow-up.    Needs Paroxetine refilled, he says mood is doing really well PHQ 9 score today of 1.  Though he has trouble sleeping when he gets up to urinate once per night he frequently is up for 3 hours.  He is looking for sleeping medication.    Cardiology note 7/23/2020 briefly reviewed he was taken off fenofibrate since he had made dietary changes for his triglycerides.  He is pending repeat lipid panel. Li;pid panel 6/30/20  total cholesterol 164, triglycerides 136, HDL 39 LDL 98.  He remains on statin which has been well-tolerated.    Additional labs from 6/30/2020 show BMP remarkable for glucose 105, A1c 5.8 and TSH went back to normal at 2.150.    Allergies: Amoxicillin    Current Outpatient Medications Ordered in Epic   Medication Sig Dispense Refill   • PARoxetine (PAXIL) 10 MG Tab Take 1 Tab by mouth every day. 90 Tab 3   • traZODone (DESYREL) 50 MG Tab Take 1 Tab by mouth at bedtime as needed for Sleep. 30 Tab 3   • simvastatin (ZOCOR) 20 MG Tab TAKE 1 TABLET BY MOUTH EVERY DAY 90 Tab 1   • NON SPECIFIED C-NITROGLYCERIN 0.125% OINT SIG: 15, Refills: 2. Indiciation for use: Tenderness or swelling of minor hemorrhoids.  Directions for use: Apply to ravinder-rectal area twice a day, as needed. 15 Each 0   • rivaroxaban (XARELTO) 20 MG Tab tablet Take 1 Tab by mouth with dinner. 90 Tab 3     No current Epic-ordered facility-administered medications on file.        Past Medical History:   Diagnosis Date   • Arrhythmia 2011    on metoprolol; hx of afib   • Atypical atrial flutter (HCC) seen 12/2019    • Chronic anticoagulation    • Heart burn    • High cholesterol    • Hypertension    • Pneumonia 2018   • Renal disorder 2011     had short term kidney failure, with 3 dialysis treatments       Past Surgical History:   Procedure Laterality Date   • PB COLONOSCOPY,DIAGNOSTIC  2020    Procedure: COLONOSCOPY - WITH POSSIBLE BIOPSY, DILATION, POLYPECTOMY, CONTROL OF HEMORRHAGE;  Surgeon: Migel Cordero M.D.;  Location: SURGERY [facility];  Service: Gastroenterology   • COLONOSCOPY N/A 9/10/2018    Procedure: COLONOSCOPY;  Surgeon: Migel Cordero M.D.;  Location: SURGERY SAME DAY [facility];  Service: Gastroenterology   • GASTROSCOPY-ENDO  10/13/2013    Performed by Oseas Madden M.D. at ENDOSCOPY [facility] ORS   • INGUINAL HERNIA REPAIR  3/26/2013    Performed by Felipe Carter M.D. at SURGERY SAME DAY [facility] ORS   • OTHER      dialysis cath insertion and removal   • COLONOSCOPY         Social History     Tobacco Use   • Smoking status: Former Smoker     Packs/day: 0.60     Years: 50.00     Pack years: 30.00     Types: Cigarettes     Quit date: 10/1/2011     Years since quittin.0   • Smokeless tobacco: Never Used   Substance Use Topics   • Alcohol use: Yes     Frequency: 2-4 times a month     Comment: quit  (hx heavy use 1 yr)   • Drug use: Not Currently     Comment: History of cocaine use quit        Social History     Social History Narrative   • Not on file       Family History   Problem Relation Age of Onset   • No Known Problems Mother    • No Known Problems Father        ROS:     - Constitutional: Negative for fever, chills     - Eyes:   Negative for  eye pain, discharge    - ENT:  Negative for hearing changes, ear pain, ear discharge, rhinorrhea, sinus congestion, sore throat     - Respiratory: Negative for cough, sputum production, chest congestion, dyspnea, wheezing, and crackles.      - Cardiovascular: Negative for chest pain, palpitations, orthopnea, and bilateral lower extremity edema.     - Gastrointestinal: Negative for heartburn, nausea, vomiting, abdominal pain, hematochezia, melena,  "diarrhea, constipation, and greasy/foul-smelling stools.     - Genitourinary: Negative for dysuria    - Musculoskeletal: Negative for myalgias, back pain, and joint pain.     - Skin: Negative for rash, itching, cyanotic skin color change.     - Neurological: Negative for  vertigo    - Endo:Negative for polyuria, heat/cold intolerance, excessive thirst    - Hem/lymphatic: Negative for easy bruising, blood clots, lymphedema, swollen glands    -Allergic/immun: Negative for allergic rhinitis    - Psychiatric/Behavioral: Negative for depression, suicidal/homicidal ideation and memory loss.      Exam: /60 (BP Location: Right arm, Patient Position: Sitting, BP Cuff Size: Adult)   Pulse 82   Temp 36.7 °C (98.1 °F) (Temporal)   Resp 16   Ht 1.753 m (5' 9\")   Wt 86 kg (189 lb 9.5 oz)   SpO2 95%  Body mass index is 28 kg/m².    General: Normal appearing. No distress.  EYES: Conjunctiva clear lids without ptosis, pupils equal  EARS: Normal shape and contour   Pulmonary: Clear to ausculation.  Normal effort. No rales or wheezing.  Cardiovascular: Regular rate and rhythm without significant murmur.   Abdomen: Soft, nontender, nondistended. Normal bowel sounds.  Neurologic: Cranial nerves grossly nonfocal  Lymph: No cervical, supraclavicular nodes palpable  Skin: Warm and dry.  No obvious lesions.  Musculoskeletal: Normal gait. No extremity cyanosis, clubbing, or edema.  Psych: Normal mood and affect. Alert and oriented x3. Judgment and insight is normal.      Assessment/Plan  1. Mild episode of recurrent major depressive disorder (HCC)  Stable, chronic well-controlled continue with current management.  - PARoxetine (PAXIL) 10 MG Tab; Take 1 Tab by mouth every day.  Dispense: 90 Tab; Refill: 3    2. Other insomnia  Plan to try trazodone.  Discussed risk of serotonin syndrome and symptoms to monitor for and if these develop to stop the medications and seek medical attention as well as let me know.  Also discussed with " him rare potential for priapism.  - traZODone (DESYREL) 50 MG Tab; Take 1 Tab by mouth at bedtime as needed for Sleep.  Dispense: 30 Tab; Refill: 3    3. Need for vaccination  - Shingles Vaccine (Shingrix)    4. Dyslipidemia  Stable, chronic now just on statin with pending repeat lipids.    RTC 6 months annual wellness          Please note that this dictation was created using voice recognition software. I have made every reasonable attempt to correct obvious errors, but I expect that there are errors of grammar and possibly content that I did not discover before finalizing the note.

## 2020-11-07 DIAGNOSIS — I48.0 PAF (PAROXYSMAL ATRIAL FIBRILLATION) (HCC): ICD-10-CM

## 2020-11-09 RX ORDER — RIVAROXABAN 20 MG/1
TABLET, FILM COATED ORAL
Qty: 90 TAB | Refills: 3 | Status: SHIPPED | OUTPATIENT
Start: 2020-11-09 | End: 2021-11-19

## 2020-12-10 ENCOUNTER — HOSPITAL ENCOUNTER (OUTPATIENT)
Dept: LAB | Facility: MEDICAL CENTER | Age: 75
End: 2020-12-10
Attending: INTERNAL MEDICINE
Payer: MEDICARE

## 2020-12-10 DIAGNOSIS — E78.5 DYSLIPIDEMIA: Chronic | ICD-10-CM

## 2020-12-10 LAB
CHOLEST SERPL-MCNC: 185 MG/DL (ref 100–199)
FASTING STATUS PATIENT QL REPORTED: NORMAL
HDLC SERPL-MCNC: 37 MG/DL
LDLC SERPL CALC-MCNC: 100 MG/DL
TRIGL SERPL-MCNC: 242 MG/DL (ref 0–149)

## 2020-12-10 PROCEDURE — 36415 COLL VENOUS BLD VENIPUNCTURE: CPT

## 2020-12-10 PROCEDURE — 80061 LIPID PANEL: CPT

## 2020-12-11 ENCOUNTER — TELEPHONE (OUTPATIENT)
Dept: CARDIOLOGY | Facility: MEDICAL CENTER | Age: 75
End: 2020-12-11

## 2020-12-11 NOTE — TELEPHONE ENCOUNTER
Called and spoke to patients wife Leda, relayed recommendations and she clarified simvastatin and to continue taking that as well.  Answered all questions and concerns, appreciative of phone call.

## 2020-12-11 NOTE — TELEPHONE ENCOUNTER
----- Message from Liseth Bowling R.N. sent at 12/11/2020  8:15 AM PST -----    ----- Message -----  From: Smooth Cruz M.D.  Sent: 12/11/2020   7:59 AM PST  To: Liseth Bowling R.N.    Labs look okay his TG were higher than prior, work on diet, but he doesn't need the fenofibrate, please let him know     Thank you

## 2020-12-28 DIAGNOSIS — Z23 NEED FOR VACCINATION: ICD-10-CM

## 2020-12-29 ENCOUNTER — APPOINTMENT (OUTPATIENT)
Dept: MEDICAL GROUP | Facility: MEDICAL CENTER | Age: 75
End: 2020-12-29
Payer: MEDICARE

## 2021-01-05 DIAGNOSIS — E78.5 HYPERLIPIDEMIA, UNSPECIFIED HYPERLIPIDEMIA TYPE: ICD-10-CM

## 2021-01-05 RX ORDER — SIMVASTATIN 20 MG
TABLET ORAL
Qty: 90 TAB | Refills: 0 | Status: SHIPPED | OUTPATIENT
Start: 2021-01-05 | End: 2021-02-24

## 2021-01-05 NOTE — TELEPHONE ENCOUNTER
Received request via: Pharmacy    Was the patient seen in the last year in this department? Yes    Does the patient have an active prescription (recently filled or refills available) for medication(s) requested? No     Last OV 9/29/20.

## 2021-01-26 DIAGNOSIS — G47.09 OTHER INSOMNIA: ICD-10-CM

## 2021-01-26 RX ORDER — TRAZODONE HYDROCHLORIDE 50 MG/1
TABLET ORAL
Qty: 90 TAB | Refills: 1 | Status: SHIPPED | OUTPATIENT
Start: 2021-01-26 | End: 2021-02-24 | Stop reason: SDUPTHER

## 2021-02-24 ENCOUNTER — OFFICE VISIT (OUTPATIENT)
Dept: MEDICAL GROUP | Facility: MEDICAL CENTER | Age: 76
End: 2021-02-24
Payer: MEDICARE

## 2021-02-24 VITALS
TEMPERATURE: 97 F | WEIGHT: 196.21 LBS | SYSTOLIC BLOOD PRESSURE: 146 MMHG | HEART RATE: 76 BPM | DIASTOLIC BLOOD PRESSURE: 100 MMHG | RESPIRATION RATE: 16 BRPM | OXYGEN SATURATION: 96 % | HEIGHT: 69 IN | BODY MASS INDEX: 29.06 KG/M2

## 2021-02-24 DIAGNOSIS — Z12.5 ENCOUNTER FOR SCREENING FOR MALIGNANT NEOPLASM OF PROSTATE: ICD-10-CM

## 2021-02-24 DIAGNOSIS — R73.03 PREDIABETES: ICD-10-CM

## 2021-02-24 DIAGNOSIS — G47.09 OTHER INSOMNIA: ICD-10-CM

## 2021-02-24 DIAGNOSIS — E78.5 HYPERLIPIDEMIA, UNSPECIFIED HYPERLIPIDEMIA TYPE: ICD-10-CM

## 2021-02-24 DIAGNOSIS — I10 ESSENTIAL HYPERTENSION: ICD-10-CM

## 2021-02-24 DIAGNOSIS — E78.5 DYSLIPIDEMIA: Chronic | ICD-10-CM

## 2021-02-24 DIAGNOSIS — M25.552 LEFT HIP PAIN: ICD-10-CM

## 2021-02-24 PROCEDURE — 99214 OFFICE O/P EST MOD 30 MIN: CPT | Performed by: INTERNAL MEDICINE

## 2021-02-24 RX ORDER — TRAZODONE HYDROCHLORIDE 50 MG/1
TABLET ORAL
Qty: 90 TABLET | Refills: 1 | Status: SHIPPED | OUTPATIENT
Start: 2021-02-24 | End: 2022-02-15

## 2021-02-24 RX ORDER — SIMVASTATIN 40 MG
20 TABLET ORAL
Qty: 90 TABLET | Refills: 0 | Status: SHIPPED | OUTPATIENT
Start: 2021-02-24 | End: 2021-05-24 | Stop reason: SDUPTHER

## 2021-02-24 ASSESSMENT — PATIENT HEALTH QUESTIONNAIRE - PHQ9
8. MOVING OR SPEAKING SO SLOWLY THAT OTHER PEOPLE COULD HAVE NOTICED. OR THE OPPOSITE, BEING SO FIGETY OR RESTLESS THAT YOU HAVE BEEN MOVING AROUND A LOT MORE THAN USUAL: NOT AT ALL
2. FEELING DOWN, DEPRESSED, IRRITABLE, OR HOPELESS: NOT AT ALL
SUM OF ALL RESPONSES TO PHQ9 QUESTIONS 1 AND 2: 0
7. TROUBLE CONCENTRATING ON THINGS, SUCH AS READING THE NEWSPAPER OR WATCHING TELEVISION: NOT AT ALL
SUM OF ALL RESPONSES TO PHQ QUESTIONS 1-9: 0
1. LITTLE INTEREST OR PLEASURE IN DOING THINGS: NOT AT ALL
3. TROUBLE FALLING OR STAYING ASLEEP OR SLEEPING TOO MUCH: NOT AT ALL
4. FEELING TIRED OR HAVING LITTLE ENERGY: NOT AT ALL
5. POOR APPETITE OR OVEREATING: NOT AT ALL
6. FEELING BAD ABOUT YOURSELF - OR THAT YOU ARE A FAILURE OR HAVE LET YOURSELF OR YOUR FAMILY DOWN: NOT AL ALL
9. THOUGHTS THAT YOU WOULD BE BETTER OFF DEAD, OR OF HURTING YOURSELF: NOT AT ALL

## 2021-02-24 ASSESSMENT — FIBROSIS 4 INDEX: FIB4 SCORE: 1.37

## 2021-02-24 NOTE — PROGRESS NOTES
CC:  Diagnoses of Dyslipidemia, Essential hypertension, Hyperlipidemia, unspecified hyperlipidemia type, Prediabetes, Encounter for screening for malignant neoplasm of prostate, Other insomnia, and Left hip pain were pertinent to this visit.    HISTORY OF THE PRESENT ILLNESS: Patient is a 75 y.o. male. This pleasant patient is here today for follow-up.    Last appointment we started trazodone and this has been very effective for sleep and no side adverse effects.    Labs reviewed 12/10/2020 total cholesterol 185, triglycerides increased to 136 up to 242 off the fenofibrate, HDL 37, .  Compliant on simvastatin 20 mg daily.  He indicates his diet has not changed.  No cardiopulmonary or strokelike symptoms.    Blood pressure little high today he says he will start monitoring it regularly at home let me know if consistently over 120/80.  He says he did have some coffee this morning which may have contributed.    Chronic left hip discomfort described as primarily stiffness and occasionally pain radiating distally down the joint to mid thigh.  It is affecting his golf game and he wants to see physical therapy.    Allergies: Amoxicillin    Current Outpatient Medications Ordered in Epic   Medication Sig Dispense Refill   • simvastatin (ZOCOR) 40 MG Tab Take 0.5 Tablets by mouth every day. 90 tablet 0   • traZODone (DESYREL) 50 MG Tab TAKE 1 TABLET BY MOUTH AT BEDTIME AS NEEDED FOR SLEEP 90 tablet 1   • XARELTO 20 MG Tab tablet TAKE 1 TAB BY MOUTH WITH DINNER. 90 Tab 3   • PARoxetine (PAXIL) 10 MG Tab Take 1 Tab by mouth every day. 90 Tab 3   • NON SPECIFIED C-NITROGLYCERIN 0.125% OINT SIG: 15, Refills: 2. Indiciation for use: Tenderness or swelling of minor hemorrhoids.  Directions for use: Apply to ravinder-rectal area twice a day, as needed. 15 Each 0     No current [facility]-ordered facility-administered medications on file.       Past Medical History:   Diagnosis Date   • Arrhythmia 2011    on metoprolol; hx of afib   •  "Atypical atrial flutter (HCC) seen 2019    • Chronic anticoagulation    • Heart burn    • High cholesterol    • Hypertension    • Pneumonia    • Renal disorder     had short term kidney failure, with 3 dialysis treatments       Past Surgical History:   Procedure Laterality Date   • PB COLONOSCOPY,DIAGNOSTIC  2020    Procedure: COLONOSCOPY - WITH POSSIBLE BIOPSY, DILATION, POLYPECTOMY, CONTROL OF HEMORRHAGE;  Surgeon: Migel Cordero M.D.;  Location: SURGERY [facility];  Service: Gastroenterology   • COLONOSCOPY N/A 9/10/2018    Procedure: COLONOSCOPY;  Surgeon: Migel Cordero M.D.;  Location: SURGERY SAME DAY [facility];  Service: Gastroenterology   • GASTROSCOPY-ENDO  10/13/2013    Performed by Oseas Madden M.D. at ENDOSCOPY [facility]   • INGUINAL HERNIA REPAIR  3/26/2013    Performed by Felipe Carter M.D. at SURGERY SAME DAY [facility]   • OTHER      dialysis cath insertion and removal   • COLONOSCOPY         Social History     Tobacco Use   • Smoking status: Former Smoker     Packs/day: 0.60     Years: 50.00     Pack years: 30.00     Types: Cigarettes     Quit date: 10/1/2011     Years since quittin.4   • Smokeless tobacco: Never Used   Substance Use Topics   • Alcohol use: Yes     Comment: quit  (hx heavy use 1 yr)   • Drug use: Not Currently     Comment: History of cocaine use quit        Social History     Social History Narrative   • Not on file       Family History   Problem Relation Age of Onset   • No Known Problems Mother    • No Known Problems Father        Exam: /100 (BP Location: Right arm, Patient Position: Sitting, BP Cuff Size: Adult)   Pulse 76   Temp 36.1 °C (97 °F) (Temporal)   Resp 16   Ht 1.753 m (5' 9\")   Wt 89 kg (196 lb 3.4 oz)   SpO2 96%  Body mass index is 28.98 kg/m².    General: Normal appearing. No distress.  EYES: Conjunctiva clear lids without ptosis, pupils equal  EARS: Normal shape and contour   Pulmonary: Clear to " ausculation.  Normal effort. No rales or wheezing.  Cardiovascular: Regular rate and rhythm without significant murmur.   Abdomen: Soft, nontender, nondistended. Normal bowel sounds.  Neurologic: Cranial nerves grossly nonfocal  Skin: Warm and dry.  No obvious lesions.  Musculoskeletal: Normal gait. No extremity cyanosis, clubbing, or edema.  Psych: Normal mood and affect. Alert and oriented x3. Judgment and insight is normal.      Assessment/Plan  1. Dyslipidemia  Plan increase simvastatin from 20 up to 40 mg and reassess lipid panel within 3 months.    2. Essential hypertension  High today, he will monitor at home and let me know if consistently elevated and we can start medication management.  - CBC WITHOUT DIFFERENTIAL; Future  - Comp Metabolic Panel; Future    3. Hyperlipidemia, unspecified hyperlipidemia type  - simvastatin (ZOCOR) 40 MG Tab; Take 0.5 Tablets by mouth every day.  Dispense: 90 tablet; Refill: 0  - Lipid Profile; Future  - Comp Metabolic Panel; Future    4. Prediabetes  Plan to assess status and encourage healthy diet/exercise.  - CBC WITHOUT DIFFERENTIAL; Future  - Comp Metabolic Panel; Future  - HEMOGLOBIN A1C; Future    5. Encounter for screening for malignant neoplasm of prostate  Patient desires to continue screening.  - PROSTATE SPECIFIC AG SCREENING; Future    6. Other insomnia  Medication very effective, improved sleep.  - traZODone (DESYREL) 50 MG Tab; TAKE 1 TABLET BY MOUTH AT BEDTIME AS NEEDED FOR SLEEP  Dispense: 90 tablet; Refill: 1    7. Left hip pain  Hip stiffness would benefit from physical therapy.  - REFERRAL TO PHYSICAL THERAPY        RTC 3 months      Please note that this dictation was created using voice recognition software. I have made every reasonable attempt to correct obvious errors, but I expect that there are errors of grammar and possibly content that I did not discover before finalizing the note.

## 2021-02-25 ENCOUNTER — IMMUNIZATION (OUTPATIENT)
Dept: FAMILY PLANNING/WOMEN'S HEALTH CLINIC | Facility: IMMUNIZATION CENTER | Age: 76
End: 2021-02-25
Attending: FAMILY MEDICINE
Payer: MEDICARE

## 2021-02-25 DIAGNOSIS — Z23 NEED FOR VACCINATION: ICD-10-CM

## 2021-02-25 DIAGNOSIS — Z23 ENCOUNTER FOR VACCINATION: Primary | ICD-10-CM

## 2021-02-25 PROCEDURE — 91301 MODERNA SARS-COV-2 VACCINE: CPT

## 2021-02-25 PROCEDURE — 0011A MODERNA SARS-COV-2 VACCINE: CPT

## 2021-03-25 ENCOUNTER — IMMUNIZATION (OUTPATIENT)
Dept: OTHER | Facility: MEDICAL CENTER | Age: 76
End: 2021-03-25
Attending: INTERNAL MEDICINE
Payer: MEDICARE

## 2021-03-25 DIAGNOSIS — Z23 ENCOUNTER FOR VACCINATION: Primary | ICD-10-CM

## 2021-03-25 PROCEDURE — 0012A MODERNA SARS-COV-2 VACCINE: CPT

## 2021-03-25 PROCEDURE — 91301 MODERNA SARS-COV-2 VACCINE: CPT

## 2021-05-04 DIAGNOSIS — I10 ESSENTIAL HYPERTENSION: ICD-10-CM

## 2021-05-04 RX ORDER — METOPROLOL SUCCINATE 50 MG/1
50 TABLET, EXTENDED RELEASE ORAL DAILY
Qty: 180 TABLET | Refills: 0 | Status: SHIPPED | OUTPATIENT
Start: 2021-05-04 | End: 2021-10-31

## 2021-05-18 ENCOUNTER — HOSPITAL ENCOUNTER (OUTPATIENT)
Dept: LAB | Facility: MEDICAL CENTER | Age: 76
End: 2021-05-18
Attending: INTERNAL MEDICINE
Payer: MEDICARE

## 2021-05-18 DIAGNOSIS — R73.03 PREDIABETES: ICD-10-CM

## 2021-05-18 DIAGNOSIS — Z12.5 ENCOUNTER FOR SCREENING FOR MALIGNANT NEOPLASM OF PROSTATE: ICD-10-CM

## 2021-05-18 DIAGNOSIS — I10 ESSENTIAL HYPERTENSION: ICD-10-CM

## 2021-05-18 DIAGNOSIS — E78.5 HYPERLIPIDEMIA, UNSPECIFIED HYPERLIPIDEMIA TYPE: ICD-10-CM

## 2021-05-18 LAB
ALBUMIN SERPL BCP-MCNC: 4.2 G/DL (ref 3.2–4.9)
ALBUMIN/GLOB SERPL: 1.4 G/DL
ALP SERPL-CCNC: 109 U/L (ref 30–99)
ALT SERPL-CCNC: 20 U/L (ref 2–50)
ANION GAP SERPL CALC-SCNC: 11 MMOL/L (ref 7–16)
AST SERPL-CCNC: 17 U/L (ref 12–45)
BILIRUB SERPL-MCNC: 0.4 MG/DL (ref 0.1–1.5)
BUN SERPL-MCNC: 19 MG/DL (ref 8–22)
CALCIUM SERPL-MCNC: 9.5 MG/DL (ref 8.5–10.5)
CHLORIDE SERPL-SCNC: 109 MMOL/L (ref 96–112)
CHOLEST SERPL-MCNC: 198 MG/DL (ref 100–199)
CO2 SERPL-SCNC: 22 MMOL/L (ref 20–33)
CREAT SERPL-MCNC: 1.04 MG/DL (ref 0.5–1.4)
FASTING STATUS PATIENT QL REPORTED: NORMAL
GLOBULIN SER CALC-MCNC: 2.9 G/DL (ref 1.9–3.5)
GLUCOSE SERPL-MCNC: 95 MG/DL (ref 65–99)
HDLC SERPL-MCNC: 40 MG/DL
LDLC SERPL CALC-MCNC: 105 MG/DL
POTASSIUM SERPL-SCNC: 4.4 MMOL/L (ref 3.6–5.5)
PROT SERPL-MCNC: 7.1 G/DL (ref 6–8.2)
SODIUM SERPL-SCNC: 142 MMOL/L (ref 135–145)
TRIGL SERPL-MCNC: 266 MG/DL (ref 0–149)

## 2021-05-18 PROCEDURE — 85027 COMPLETE CBC AUTOMATED: CPT

## 2021-05-18 PROCEDURE — 80053 COMPREHEN METABOLIC PANEL: CPT

## 2021-05-18 PROCEDURE — 36415 COLL VENOUS BLD VENIPUNCTURE: CPT | Mod: GA

## 2021-05-18 PROCEDURE — 83036 HEMOGLOBIN GLYCOSYLATED A1C: CPT | Mod: GA

## 2021-05-18 PROCEDURE — 80061 LIPID PANEL: CPT

## 2021-05-18 PROCEDURE — 84153 ASSAY OF PSA TOTAL: CPT | Mod: GA

## 2021-05-19 ENCOUNTER — TELEPHONE (OUTPATIENT)
Dept: MEDICAL GROUP | Facility: MEDICAL CENTER | Age: 76
End: 2021-05-19

## 2021-05-19 LAB
ERYTHROCYTE [DISTWIDTH] IN BLOOD BY AUTOMATED COUNT: 47.8 FL (ref 35.9–50)
EST. AVERAGE GLUCOSE BLD GHB EST-MCNC: 117 MG/DL
HBA1C MFR BLD: 5.7 % (ref 4–5.6)
HCT VFR BLD AUTO: 51.5 % (ref 42–52)
HGB BLD-MCNC: 16 G/DL (ref 14–18)
MCH RBC QN AUTO: 28.6 PG (ref 27–33)
MCHC RBC AUTO-ENTMCNC: 31.1 G/DL (ref 33.7–35.3)
MCV RBC AUTO: 92 FL (ref 81.4–97.8)
PLATELET # BLD AUTO: 218 K/UL (ref 164–446)
PMV BLD AUTO: 10 FL (ref 9–12.9)
PSA SERPL-MCNC: 2.51 NG/ML (ref 0–4)
RBC # BLD AUTO: 5.6 M/UL (ref 4.7–6.1)
WBC # BLD AUTO: 5.2 K/UL (ref 4.8–10.8)

## 2021-05-19 NOTE — TELEPHONE ENCOUNTER
ESTABLISHED PATIENT PRE-VISIT PLANNING     Patient was NOT contacted to complete PVP.     Note: Patient will not be contacted if there is no indication to call.     1.  Reviewed notes from the last few office visits within the medical group: Yes    2.  If any orders were placed at last visit or intended to be done for this visit (i.e. 6 mos follow-up), do we have Results/Consult Notes?         •  Labs - Labs ordered, completed on 05/18/21 and results are in chart.  Note: If patient appointment is for lab review and patient did not complete labs, check with provider if OK to reschedule patient until labs completed.       •  Imaging - Imaging was not ordered at last office visit.       •  Referrals - No referrals were ordered at last office visit.    3. Is this appointment scheduled as a Hospital Follow-Up? No    4.  Immunizations were updated in Epic using Reconcile Outside Information activity? Yes    5.  Patient is due for the following Health Maintenance Topics:   Health Maintenance Due   Topic Date Due   • Annual Wellness Visit  07/29/2020   • IMM ZOSTER VACCINES (2 of 2) 11/24/2020

## 2021-05-24 ENCOUNTER — OFFICE VISIT (OUTPATIENT)
Dept: MEDICAL GROUP | Facility: MEDICAL CENTER | Age: 76
End: 2021-05-24
Payer: MEDICARE

## 2021-05-24 VITALS
WEIGHT: 188.49 LBS | TEMPERATURE: 96.9 F | HEIGHT: 69 IN | SYSTOLIC BLOOD PRESSURE: 128 MMHG | DIASTOLIC BLOOD PRESSURE: 74 MMHG | OXYGEN SATURATION: 95 % | HEART RATE: 71 BPM | BODY MASS INDEX: 27.92 KG/M2 | RESPIRATION RATE: 18 BRPM

## 2021-05-24 DIAGNOSIS — E78.5 HYPERLIPIDEMIA, UNSPECIFIED HYPERLIPIDEMIA TYPE: ICD-10-CM

## 2021-05-24 DIAGNOSIS — I10 ESSENTIAL HYPERTENSION: ICD-10-CM

## 2021-05-24 DIAGNOSIS — Z23 NEED FOR VACCINATION: ICD-10-CM

## 2021-05-24 DIAGNOSIS — R73.03 PREDIABETES: ICD-10-CM

## 2021-05-24 DIAGNOSIS — E78.5 DYSLIPIDEMIA: Chronic | ICD-10-CM

## 2021-05-24 DIAGNOSIS — R74.8 ELEVATED ALKALINE PHOSPHATASE LEVEL: ICD-10-CM

## 2021-05-24 PROCEDURE — 90750 HZV VACC RECOMBINANT IM: CPT | Performed by: INTERNAL MEDICINE

## 2021-05-24 PROCEDURE — 99214 OFFICE O/P EST MOD 30 MIN: CPT | Mod: 25 | Performed by: INTERNAL MEDICINE

## 2021-05-24 PROCEDURE — 90471 IMMUNIZATION ADMIN: CPT | Performed by: INTERNAL MEDICINE

## 2021-05-24 RX ORDER — SIMVASTATIN 40 MG
40 TABLET ORAL
Qty: 90 TABLET | Refills: 0 | Status: SHIPPED | OUTPATIENT
Start: 2021-05-24 | End: 2021-09-07

## 2021-05-24 RX ORDER — CHLORHEXIDINE GLUCONATE ORAL RINSE 1.2 MG/ML
SOLUTION DENTAL
COMMUNITY
Start: 2021-04-15 | End: 2022-02-22

## 2021-05-24 RX ORDER — IBUPROFEN 800 MG/1
800 TABLET ORAL
COMMUNITY
Start: 2021-03-30 | End: 2022-02-22

## 2021-05-24 ASSESSMENT — FIBROSIS 4 INDEX: FIB4 SCORE: 1.27

## 2021-05-24 NOTE — PROGRESS NOTES
CC:  Diagnoses of Hyperlipidemia, unspecified hyperlipidemia type, Elevated alkaline phosphatase level, Essential hypertension, Dyslipidemia, Prediabetes, and Need for vaccination were pertinent to this visit.    HISTORY OF THE PRESENT ILLNESS: Patient is a 75 y.o. male. This pleasant patient is here today for routine follow-up.    Hip pain resolved after 5 physical therapy sessions.    Blood pressure has been normal at home.    Lipids remain uncontrolled, he says he has been taking the simvastatin 20 mg not 40 mg dose due to confusion at the pharmacy.  No cardiopulmonary or strokelike symptoms.    Labs reviewed 5/18/2010 with normal GFR, lipid panel showing total cholesterol 198, triglycerides 266, HDL 40 and .  CBC reasonable, PSA within normal limits, CMP showing minimally elevated alkaline phosphatase (with no GI symptoms or bone pain), and hemoglobin A1c minimally elevated at 5.7.    Allergies: Amoxicillin    Current Outpatient Medications Ordered in Epic   Medication Sig Dispense Refill   • simvastatin (ZOCOR) 40 MG Tab Take 1 tablet by mouth every day. 90 tablet 0   • chlorhexidine (PERIDEX) 0.12 % Solution RINSE 15 ML S TWICE DAILY AFTER BREAKFAST/BEFORE BEDTIME FOLLOWING BRUSHING AND FLOSSING     • ibuprofen (MOTRIN) 800 MG Tab Take 800 mg by mouth.     • metoprolol SR (TOPROL XL) 50 MG TABLET SR 24 HR TAKE 1 TAB BY MOUTH EVERY DAY  DAYS. 180 tablet 0   • traZODone (DESYREL) 50 MG Tab TAKE 1 TABLET BY MOUTH AT BEDTIME AS NEEDED FOR SLEEP 90 tablet 1   • XARELTO 20 MG Tab tablet TAKE 1 TAB BY MOUTH WITH DINNER. 90 Tab 3   • PARoxetine (PAXIL) 10 MG Tab Take 1 Tab by mouth every day. 90 Tab 3   • NON SPECIFIED C-NITROGLYCERIN 0.125% OINT SIG: 15, Refills: 2. Indiciation for use: Tenderness or swelling of minor hemorrhoids.  Directions for use: Apply to ravinder-rectal area twice a day, as needed. 15 Each 0     No current [facility]-ordered facility-administered medications on file.       Past Medical  "History:   Diagnosis Date   • Arrhythmia     on metoprolol; hx of afib   • Atypical atrial flutter (HCC) seen 2019    • Chronic anticoagulation    • Heart burn    • High cholesterol    • Hypertension    • Pneumonia    • Renal disorder     had short term kidney failure, with 3 dialysis treatments       Past Surgical History:   Procedure Laterality Date   • PB COLONOSCOPY,DIAGNOSTIC  2020    Procedure: COLONOSCOPY - WITH POSSIBLE BIOPSY, DILATION, POLYPECTOMY, CONTROL OF HEMORRHAGE;  Surgeon: Migel Cordero M.D.;  Location: SURGERY [facility];  Service: Gastroenterology   • COLONOSCOPY N/A 9/10/2018    Procedure: COLONOSCOPY;  Surgeon: Migel Cordero M.D.;  Location: SURGERY SAME DAY [facility];  Service: Gastroenterology   • GASTROSCOPY-ENDO  10/13/2013    Performed by Oseas Madden M.D. at ENDOSCOPY [facility]   • INGUINAL HERNIA REPAIR  3/26/2013    Performed by Felipe Carter M.D. at SURGERY SAME DAY [facility]   • OTHER      dialysis cath insertion and removal   • COLONOSCOPY         Social History     Tobacco Use   • Smoking status: Former Smoker     Packs/day: 0.60     Years: 50.00     Pack years: 30.00     Types: Cigarettes     Quit date: 10/1/2011     Years since quittin.6   • Smokeless tobacco: Never Used   Vaping Use   • Vaping Use: Never used   Substance Use Topics   • Alcohol use: Yes     Comment: quit  (hx heavy use 1 yr)   • Drug use: Not Currently     Comment: History of cocaine use quit        Social History     Social History Narrative   • Not on file       Family History   Problem Relation Age of Onset   • No Known Problems Mother    • No Known Problems Father        Exam: /74 (BP Location: Right arm, Patient Position: Sitting, BP Cuff Size: Large adult)   Pulse 71   Temp 36.1 °C (96.9 °F) (Temporal)   Resp 18   Ht 1.753 m (5' 9\")   Wt 85.5 kg (188 lb 7.9 oz)   SpO2 95%  Body mass index is 27.84 kg/m².    General: Normal " appearing. No distress.  EYES: Conjunctiva clear lids without ptosis, pupils equal  EARS: Normal shape and contour   Pulmonary: Clear to ausculation.  Normal effort. No rales or wheezing.  Cardiovascular: Irregular, irregular   abdomen: Soft, nontender, nondistended. Normal bowel sounds.  Neurologic: Cranial nerves grossly nonfocal  Skin: Warm and dry.  No obvious lesions.  Musculoskeletal: Normal gait. No extremity cyanosis, clubbing, or edema.  Psych: Normal mood and affect. Alert and oriented x3. Judgment and insight is normal.      Assessment/Plan  1. Hyperlipidemia, unspecified hyperlipidemia type  Increase simvastatin from 20 to 40 mg and follow-up lipids in 3 months.  - simvastatin (ZOCOR) 40 MG Tab; Take 1 tablet by mouth every day.  Dispense: 90 tablet; Refill: 0  - Lipid Profile; Future  - HEPATIC FUNCTION PANEL; Future    2. Elevated alkaline phosphatase level  Asymptomatic, plan to fractionate.  - ALKALINE PHOSPHATASE ISOENZYMES; Future    3. Essential hypertension  Well-controlled continue metoprolol for rate control given his atrial fibrillation.    4. Dyslipidemia  See #1 above    5. Prediabetes  He exercises regularly such as golf, watches his diet, will monitor.    6. Need for vaccination  - Shingles Vaccine (Shingrix)      RTC 3 months annual wellness        Please note that this dictation was created using voice recognition software. I have made every reasonable attempt to correct obvious errors, but I expect that there are errors of grammar and possibly content that I did not discover before finalizing the note.

## 2021-08-24 ENCOUNTER — APPOINTMENT (OUTPATIENT)
Dept: MEDICAL GROUP | Facility: MEDICAL CENTER | Age: 76
End: 2021-08-24
Payer: MEDICARE

## 2021-09-03 ENCOUNTER — HOSPITAL ENCOUNTER (OUTPATIENT)
Dept: LAB | Facility: MEDICAL CENTER | Age: 76
End: 2021-09-03
Attending: INTERNAL MEDICINE
Payer: MEDICARE

## 2021-09-03 DIAGNOSIS — E78.5 HYPERLIPIDEMIA, UNSPECIFIED HYPERLIPIDEMIA TYPE: ICD-10-CM

## 2021-09-03 DIAGNOSIS — R74.8 ELEVATED ALKALINE PHOSPHATASE LEVEL: ICD-10-CM

## 2021-09-03 LAB
ALBUMIN SERPL BCP-MCNC: 4.1 G/DL (ref 3.2–4.9)
ALP SERPL-CCNC: 87 U/L (ref 30–99)
ALT SERPL-CCNC: 13 U/L (ref 2–50)
AST SERPL-CCNC: 16 U/L (ref 12–45)
BILIRUB CONJ SERPL-MCNC: <0.2 MG/DL (ref 0.1–0.5)
BILIRUB INDIRECT SERPL-MCNC: NORMAL MG/DL (ref 0–1)
BILIRUB SERPL-MCNC: 0.3 MG/DL (ref 0.1–1.5)
CHOLEST SERPL-MCNC: 158 MG/DL (ref 100–199)
FASTING STATUS PATIENT QL REPORTED: NORMAL
HDLC SERPL-MCNC: 32 MG/DL
LDLC SERPL CALC-MCNC: 56 MG/DL
PROT SERPL-MCNC: 6.7 G/DL (ref 6–8.2)
TRIGL SERPL-MCNC: 348 MG/DL (ref 0–149)

## 2021-09-03 PROCEDURE — 80061 LIPID PANEL: CPT

## 2021-09-03 PROCEDURE — 36415 COLL VENOUS BLD VENIPUNCTURE: CPT

## 2021-09-03 PROCEDURE — 84080 ASSAY ALKALINE PHOSPHATASES: CPT

## 2021-09-03 PROCEDURE — 80076 HEPATIC FUNCTION PANEL: CPT

## 2021-09-03 PROCEDURE — 84075 ASSAY ALKALINE PHOSPHATASE: CPT | Mod: XU

## 2021-09-04 DIAGNOSIS — E78.5 HYPERLIPIDEMIA, UNSPECIFIED HYPERLIPIDEMIA TYPE: ICD-10-CM

## 2021-09-07 LAB
ALP BONE SERPL-CCNC: 38 U/L (ref 0–55)
ALP ISOS SERPL HS-CCNC: 0 U/L
ALP LIVER SERPL-CCNC: 56 U/L (ref 0–94)
ALP SERPL-CCNC: 94 U/L (ref 40–120)

## 2021-09-07 RX ORDER — SIMVASTATIN 40 MG
40 TABLET ORAL
Qty: 90 TABLET | Refills: 0 | Status: SHIPPED | OUTPATIENT
Start: 2021-09-07 | End: 2021-12-02

## 2021-09-07 NOTE — TELEPHONE ENCOUNTER
Received request via: Pharmacy    Was the patient seen in the last year in this department? Yes    Does the patient have an active prescription (recently filled or refills available) for medication(s) requested? No    Requested Prescriptions     Pending Prescriptions Disp Refills   • simvastatin (ZOCOR) 40 MG Tab [Pharmacy Med Name: SIMVASTATIN 40 MG TABLET] 90 Tablet 0     Sig: TAKE 1 TABLET BY MOUTH EVERY DAY

## 2021-09-08 ENCOUNTER — OFFICE VISIT (OUTPATIENT)
Dept: MEDICAL GROUP | Facility: MEDICAL CENTER | Age: 76
End: 2021-09-08
Payer: MEDICARE

## 2021-09-08 VITALS
WEIGHT: 185.6 LBS | BODY MASS INDEX: 27.49 KG/M2 | DIASTOLIC BLOOD PRESSURE: 76 MMHG | HEART RATE: 60 BPM | SYSTOLIC BLOOD PRESSURE: 128 MMHG | TEMPERATURE: 97.4 F | HEIGHT: 69 IN | OXYGEN SATURATION: 95 %

## 2021-09-08 DIAGNOSIS — I48.4 ATYPICAL ATRIAL FLUTTER (HCC): Chronic | ICD-10-CM

## 2021-09-08 DIAGNOSIS — R73.03 PREDIABETES: ICD-10-CM

## 2021-09-08 DIAGNOSIS — K64.9 HEMORRHOIDS, UNSPECIFIED HEMORRHOID TYPE: ICD-10-CM

## 2021-09-08 DIAGNOSIS — K22.719 BARRETT'S ESOPHAGUS WITH DYSPLASIA: ICD-10-CM

## 2021-09-08 DIAGNOSIS — D12.6 TUBULAR ADENOMA OF COLON: ICD-10-CM

## 2021-09-08 DIAGNOSIS — I48.0 PAF (PAROXYSMAL ATRIAL FIBRILLATION) (HCC): ICD-10-CM

## 2021-09-08 DIAGNOSIS — I10 ESSENTIAL HYPERTENSION: ICD-10-CM

## 2021-09-08 DIAGNOSIS — E78.5 DYSLIPIDEMIA: Chronic | ICD-10-CM

## 2021-09-08 DIAGNOSIS — F33.0 MILD EPISODE OF RECURRENT MAJOR DEPRESSIVE DISORDER (HCC): ICD-10-CM

## 2021-09-08 PROCEDURE — G0439 PPPS, SUBSEQ VISIT: HCPCS | Performed by: INTERNAL MEDICINE

## 2021-09-08 RX ORDER — ICOSAPENT ETHYL 1000 MG/1
1 CAPSULE ORAL 2 TIMES DAILY
Qty: 120 CAPSULE | Refills: 1 | Status: SHIPPED | OUTPATIENT
Start: 2021-09-08 | End: 2022-01-05

## 2021-09-08 ASSESSMENT — ENCOUNTER SYMPTOMS: GENERAL WELL-BEING: GOOD

## 2021-09-08 ASSESSMENT — PATIENT HEALTH QUESTIONNAIRE - PHQ9: CLINICAL INTERPRETATION OF PHQ2 SCORE: 0

## 2021-09-08 ASSESSMENT — ACTIVITIES OF DAILY LIVING (ADL): BATHING_REQUIRES_ASSISTANCE: 0

## 2021-09-08 ASSESSMENT — FIBROSIS 4 INDEX: FIB4 SCORE: 1.49

## 2021-09-08 NOTE — PROGRESS NOTES
Chief Complaint   Patient presents with   • Annual Exam         HPI:  Roland is a 75 y.o. here for Medicare Annual Wellness Visit    Here for annual visit, no new concerns.    The increase in simvastatin did improve his LDL from 105 down to 56 on labs 1/3/2021 but noted triglycerides remain elevated at 348.  Liver enzymes were normal.    He says he had last EGD and colonoscopy May 2021 with .  There was some colon polyps found and he was recommended repeat 3 years.  Our team will request this report.  He would like the nitroglycerin refilled for hemorrhoids.    For his atrial flutter remains on anticoagulation without any bleeding issues.  Denies any chest pain or new cardiopulmonary symptoms.    Patient Active Problem List    Diagnosis Date Noted   • Prediabetes 02/24/2021   • Other insomnia 02/24/2021   • Atypical atrial flutter (HCC) seen 12/2019    • Chronic anticoagulation    • History of tobacco use 11/27/2019   • Preventative health care 11/27/2019   • Hemorrhoids 01/13/2019   • Right hydrocele 11/30/2018   • Arthritis of left shoulder region 09/14/2018   • Tubular adenoma of colon 06/01/2018   • Essential hypertension 04/19/2018   • Hepatic steatosis 03/01/2018   • Diverticulosis of large intestine without hemorrhage 02/28/2018   • (HCC) Mild episode of recurrent major depressive disorder 02/28/2018   • Dyslipidemia    • Burgess's esophagus with dysplasia 10/15/2013   • H/O cocaine use 10/12/2013   • PAF (paroxysmal atrial fibrillation) (HCC) 10/12/2013   • H/O left inguinal hernia 03/26/2013       Current Outpatient Medications   Medication Sig Dispense Refill   • Icosapent Ethyl (VASCEPA) 1 g Cap Take 1 Capsule by mouth 2 times a day. 120 Capsule 1   • NON SPECIFIED C-NITROGLYCERIN 0.125% OINT SIG: 15, Refills: 2. Indiciation for use: Tenderness or swelling of minor hemorrhoids.  Directions for use: Apply to ravinder-rectal area twice a day, as needed. 15 Each 0   • simvastatin (ZOCOR) 40 MG Tab TAKE 1  TABLET BY MOUTH EVERY DAY 90 Tablet 0   • chlorhexidine (PERIDEX) 0.12 % Solution RINSE 15 ML S TWICE DAILY AFTER BREAKFAST/BEFORE BEDTIME FOLLOWING BRUSHING AND FLOSSING     • ibuprofen (MOTRIN) 800 MG Tab Take 800 mg by mouth.     • metoprolol SR (TOPROL XL) 50 MG TABLET SR 24 HR TAKE 1 TAB BY MOUTH EVERY DAY  DAYS. 180 tablet 0   • traZODone (DESYREL) 50 MG Tab TAKE 1 TABLET BY MOUTH AT BEDTIME AS NEEDED FOR SLEEP 90 tablet 1   • XARELTO 20 MG Tab tablet TAKE 1 TAB BY MOUTH WITH DINNER. 90 Tab 3   • PARoxetine (PAXIL) 10 MG Tab Take 1 Tab by mouth every day. 90 Tab 3     No current facility-administered medications for this visit.        Patient is taking medications as noted in medication list.  Current supplements as per medication list.     Allergies: Amoxicillin    Current social contact/activities: frequent; spend times with friends, play games, swim    Is patient current with immunizations? Yes.    He  reports that he quit smoking about 9 years ago. His smoking use included cigarettes. He has a 30.00 pack-year smoking history. He has never used smokeless tobacco. He reports current alcohol use. He reports previous drug use.  Counseling given: Not Answered        DPA/Advanced directive: Patient does not have an Advanced Directive.  A packet and workshop information was given on Advanced Directives.    ROS:    Gait: Uses no assistive device   Ostomy: No   Other tubes: No   Amputations: No   Chronic oxygen use No   Last eye exam 7/2020  Wears hearing aids: No   : Denies any urinary leakage during the last 6 months      Screening:        Depression Screening    Little interest or pleasure in doing things?  0 - not at all  Feeling down, depressed, or hopeless? 0 - not at all  Patient Health Questionnaire Score: 0    If depressive symptoms identified deferred to follow up visit unless specifically addressed in assessment and plan.    Interpretation of PHQ-9 Total Score   Score Severity   1-4 No  Depression   5-9 Mild Depression   10-14 Moderate Depression   15-19 Moderately Severe Depression   20-27 Severe Depression    Screening for Cognitive Impairment    Three Minute Recall (captain, garden, picture)  2/3    Damaso clock face with all 12 numbers and set the hands to show 5 past 8.  Yes Damaso minute hand to 12, but corrected it  If cognitive concerns identified, deferred for follow up unless specifically addressed in assessment and plan.    Fall Risk Assessment    Has the patient had two or more falls in the last year or any fall with injury in the last year?  No  If fall risk identified, deferred for follow up unless specifically addressed in assessment and plan.    Safety Assessment    Throw rugs on floor.  No  Handrails on all stairs.  Yes  Good lighting in all hallways.  Yes  Difficulty hearing.  No  Patient counseled about all safety risks that were identified.    Functional Assessment ADLs    Are there any barriers preventing you from cooking for yourself or meeting nutritional needs?  No.    Are there any barriers preventing you from driving safely or obtaining transportation?  No.    Are there any barriers preventing you from using a telephone or calling for help?  No.    Are there any barriers preventing you from shopping?  No.    Are there any barriers preventing you from taking care of your own finances?  No.    Are there any barriers preventing you from managing your medications?  No.    Are there any barriers preventing you from showering, bathing or dressing yourself?  No.    Are you currently engaging in any exercise or physical activity?  Yes.  Golf, walking  What is your perception of your health?  Good.    Health Maintenance Summary                Annual Wellness Visit Overdue 7/29/2020      Done 7/29/2019 Visit Dx: Medicare annual wellness visit, subsequent     Patient has more history with this topic...    IMM INFLUENZA Overdue 9/1/2021      Done 10/22/2019 Imm Admin: Influenza Vaccine  Adult HD     Patient has more history with this topic...    COLORECTAL CANCER SCREENING Next Due 2025     IMM DTaP/Tdap/Td Vaccine Next Due 2028      Done 2018 Imm Admin: Tdap Vaccine          Patient Care Team:  Savannah Campos M.D. as PCP - General (Internal Medicine)  Migel Cordero M.D. as Consulting Physician (Gastroenterology)    Social History     Tobacco Use   • Smoking status: Former Smoker     Packs/day: 0.60     Years: 50.00     Pack years: 30.00     Types: Cigarettes     Quit date: 10/1/2011     Years since quittin.9   • Smokeless tobacco: Never Used   Vaping Use   • Vaping Use: Never used   Substance Use Topics   • Alcohol use: Yes     Comment: quit  (hx heavy use 1 yr)   • Drug use: Not Currently     Comment: History of cocaine use quit      Family History   Problem Relation Age of Onset   • No Known Problems Mother    • No Known Problems Father      He  has a past medical history of Arrhythmia (), Atypical atrial flutter (HCC) seen 2019, Chronic anticoagulation, Heart burn, High cholesterol, Hypertension, Pneumonia (), and Renal disorder ().   Past Surgical History:   Procedure Laterality Date   • PB COLONOSCOPY,DIAGNOSTIC  2020    Procedure: COLONOSCOPY - WITH POSSIBLE BIOPSY, DILATION, POLYPECTOMY, CONTROL OF HEMORRHAGE;  Surgeon: Migel Cordero M.D.;  Location: SURGERY [facility];  Service: Gastroenterology   • COLONOSCOPY N/A 9/10/2018    Procedure: COLONOSCOPY;  Surgeon: Migel Cordero M.D.;  Location: SURGERY SAME DAY [facility];  Service: Gastroenterology   • GASTROSCOPY-ENDO  10/13/2013    Performed by Oseas Madden M.D. at ENDOSCOPY [facility]   • INGUINAL HERNIA REPAIR  3/26/2013    Performed by Felipe Carter M.D. at SURGERY SAME DAY [facility]   • OTHER      dialysis cath insertion and removal   • COLONOSCOPY             Exam:     /76 (BP Location: Left arm, Patient Position: Sitting, BP Cuff Size: Adult)    "Pulse 60   Temp 36.3 °C (97.4 °F) (Temporal)   Ht 1.753 m (5' 9\")   Wt 84.2 kg (185 lb 9.6 oz)   SpO2 95%  Body mass index is 27.41 kg/m².    Hearing good.    Dentition good  No earwax bilaterally  rrr  ctab  Alert, oriented in no acute distress.  Eye contact is good, speech goal directed, affect calm      Assessment and Plan. The following treatment and monitoring plan is recommended:    1. Dyslipidemia  Lipid Profile    Icosapent Ethyl (VASCEPA) 1 g Cap   2. Prediabetes  HEMOGLOBIN A1C    Basic Metabolic Panel   3. Essential hypertension  Basic Metabolic Panel   4. Tubular adenoma of colon     5. PAF (paroxysmal atrial fibrillation) (HCC)     6. Burgess's esophagus with dysplasia     7. Atypical atrial flutter (HCC) seen 12/2019     8. (HCC) Mild episode of recurrent major depressive disorder     9. Hemorrhoids, unspecified hemorrhoid type  NON SPECIFIED     Medical conditions are chronic and stable.  Atrial flutter remains anticoagulated and rate controlled with metoprolol.  Mood remains well controlled with Paxil.  To better control triglycerides we will add Vascepa to his simvastatin but his LDL is at goal.    Services suggested: No services needed at this time  Health Care Screening recommendations as per orders if indicated.  Referrals offered: PT/OT/Nutrition counseling/Behavioral Health/Smoking cessation as per orders if indicated.    Discussion today about general wellness and lifestyle habits:    · Prevent falls and reduce trip hazards; Cautioned about securing or removing rugs.  · Have a working fire alarm and carbon monoxide detector;   · Engage in regular physical activity and social activities       Follow-up: Return in about 6 months (around 3/8/2022).  "

## 2021-10-04 DIAGNOSIS — K64.9 HEMORRHOIDS, UNSPECIFIED HEMORRHOID TYPE: ICD-10-CM

## 2021-10-04 NOTE — TELEPHONE ENCOUNTER
Received request via: Pharmacy    Was the patient seen in the last year in this department? Yes    Does the patient have an active prescription (recently filled or refills available) for medication(s) requested? No     Requested Prescriptions     Pending Prescriptions Disp Refills   • NON SPECIFIED 15 Each 0     Sig: C-NITROGLYCERIN 0.125% OINT SIG: 15, Refills: 2. Indiciation for use: Tenderness or swelling of minor hemorrhoids.  Directions for use: Apply to ravinder-rectal area twice a day, as needed.

## 2021-10-27 ENCOUNTER — APPOINTMENT (OUTPATIENT)
Dept: MEDICAL GROUP | Facility: MEDICAL CENTER | Age: 76
End: 2021-10-27
Payer: MEDICARE

## 2021-11-01 ENCOUNTER — NON-PROVIDER VISIT (OUTPATIENT)
Dept: MEDICAL GROUP | Facility: MEDICAL CENTER | Age: 76
End: 2021-11-01
Payer: MEDICARE

## 2021-11-01 DIAGNOSIS — Z23 INFLUENZA VACCINE NEEDED: ICD-10-CM

## 2021-11-01 PROCEDURE — G0008 ADMIN INFLUENZA VIRUS VAC: HCPCS | Performed by: FAMILY MEDICINE

## 2021-11-01 PROCEDURE — 90662 IIV NO PRSV INCREASED AG IM: CPT | Performed by: FAMILY MEDICINE

## 2021-11-18 DIAGNOSIS — F33.0 MILD EPISODE OF RECURRENT MAJOR DEPRESSIVE DISORDER (HCC): ICD-10-CM

## 2021-11-18 RX ORDER — PAROXETINE 10 MG/1
TABLET, FILM COATED ORAL
Qty: 90 TABLET | Refills: 3 | Status: SHIPPED | OUTPATIENT
Start: 2021-11-18 | End: 2022-09-19

## 2021-11-18 NOTE — TELEPHONE ENCOUNTER
Received request via: Pharmacy    Was the patient seen in the last year in this department? Yes  9/8/2021  Does the patient have an active prescription (recently filled or refills available) for medication(s) requested? No

## 2021-11-19 DIAGNOSIS — I48.0 PAF (PAROXYSMAL ATRIAL FIBRILLATION) (HCC): ICD-10-CM

## 2021-11-22 ENCOUNTER — TELEPHONE (OUTPATIENT)
Dept: CARDIOLOGY | Facility: MEDICAL CENTER | Age: 76
End: 2021-11-22

## 2021-11-22 NOTE — TELEPHONE ENCOUNTER
----- Message from Clementina Caldwell, Med Ass't sent at 11/19/2021 11:48 AM PST -----  Regarding: Need Follow Appointment  Please contact patient to schedule follow up appointment. Thank you

## 2021-11-22 NOTE — TELEPHONE ENCOUNTER
Medication Refill  Lucien Tineo Ass't  You 3 days ago      Schedulers have been sent a message to follow up with patient for F/V appointment. Additional note has been placed for patient to schedule follow up visit and [employer] message has been sent. 3 month supply issued. Thank you        Routing comment      90 day courtesy refill sent.

## 2021-11-24 ENCOUNTER — OFFICE VISIT (OUTPATIENT)
Dept: MEDICAL GROUP | Facility: MEDICAL CENTER | Age: 76
End: 2021-11-24
Payer: MEDICARE

## 2021-11-24 VITALS
TEMPERATURE: 98.4 F | BODY MASS INDEX: 26.51 KG/M2 | DIASTOLIC BLOOD PRESSURE: 78 MMHG | HEART RATE: 90 BPM | SYSTOLIC BLOOD PRESSURE: 126 MMHG | OXYGEN SATURATION: 95 % | HEIGHT: 69 IN | WEIGHT: 179.01 LBS

## 2021-11-24 DIAGNOSIS — G47.09 OTHER INSOMNIA: ICD-10-CM

## 2021-11-24 DIAGNOSIS — R73.03 PREDIABETES: ICD-10-CM

## 2021-11-24 DIAGNOSIS — Z13.21 ENCOUNTER FOR VITAMIN DEFICIENCY SCREENING: ICD-10-CM

## 2021-11-24 DIAGNOSIS — Z13.0 SCREENING FOR DEFICIENCY ANEMIA: ICD-10-CM

## 2021-11-24 DIAGNOSIS — K64.9 HEMORRHOIDS, UNSPECIFIED HEMORRHOID TYPE: ICD-10-CM

## 2021-11-24 DIAGNOSIS — E78.5 DYSLIPIDEMIA: ICD-10-CM

## 2021-11-24 DIAGNOSIS — I48.4 ATYPICAL ATRIAL FLUTTER (HCC): Chronic | ICD-10-CM

## 2021-11-24 DIAGNOSIS — F33.0 MILD EPISODE OF RECURRENT MAJOR DEPRESSIVE DISORDER (HCC): ICD-10-CM

## 2021-11-24 DIAGNOSIS — I10 ESSENTIAL HYPERTENSION: ICD-10-CM

## 2021-11-24 PROBLEM — N43.3 RIGHT HYDROCELE: Status: RESOLVED | Noted: 2018-11-30 | Resolved: 2021-11-24

## 2021-11-24 PROBLEM — Z00.00 PREVENTATIVE HEALTH CARE: Status: RESOLVED | Noted: 2019-11-27 | Resolved: 2021-11-24

## 2021-11-24 PROBLEM — M19.012 ARTHRITIS OF LEFT SHOULDER REGION: Status: RESOLVED | Noted: 2018-09-14 | Resolved: 2021-11-24

## 2021-11-24 PROCEDURE — 99214 OFFICE O/P EST MOD 30 MIN: CPT | Performed by: FAMILY MEDICINE

## 2021-11-24 RX ORDER — CLINDAMYCIN HYDROCHLORIDE 300 MG/1
CAPSULE ORAL
COMMUNITY
Start: 2021-10-25 | End: 2021-11-24

## 2021-11-24 RX ORDER — HYDROCODONE BITARTRATE AND ACETAMINOPHEN 5; 325 MG/1; MG/1
TABLET ORAL
COMMUNITY
Start: 2021-11-01 | End: 2021-11-24

## 2021-11-24 RX ORDER — METOPROLOL SUCCINATE 50 MG/1
TABLET, EXTENDED RELEASE ORAL
COMMUNITY
Start: 2021-11-19 | End: 2022-06-16

## 2021-11-24 RX ORDER — DIVALPROEX SODIUM 500 MG/1
TABLET, DELAYED RELEASE ORAL
COMMUNITY
Start: 2021-09-18 | End: 2022-11-03

## 2021-11-24 RX ORDER — CEPHALEXIN 500 MG/1
CAPSULE ORAL
COMMUNITY
Start: 2021-11-01 | End: 2021-11-24

## 2021-11-24 ASSESSMENT — ENCOUNTER SYMPTOMS
SORE THROAT: 0
TINGLING: 0
WEIGHT LOSS: 0
VOMITING: 0
BLURRED VISION: 0
DIARRHEA: 0
DOUBLE VISION: 0
PALPITATIONS: 0
ABDOMINAL PAIN: 0
SHORTNESS OF BREATH: 0
CONSTIPATION: 0
DIZZINESS: 0
BRUISES/BLEEDS EASILY: 0
MYALGIAS: 0
NERVOUS/ANXIOUS: 0
DEPRESSION: 0
WEAKNESS: 0
COUGH: 0
FEVER: 0
CHILLS: 0
NAUSEA: 0

## 2021-11-24 ASSESSMENT — FIBROSIS 4 INDEX: FIB4 SCORE: 1.51

## 2021-11-24 NOTE — ASSESSMENT & PLAN NOTE
Patient is currently taking simvastatin 40 mg every evening to help lower his cholesterol levels.  Denies any myalgias.  Last cholesterol level was drawn back in August, showed elevation to his triglyceride levels

## 2021-11-24 NOTE — ASSESSMENT & PLAN NOTE
Currently taking metoprolol SR 50 mg daily to help with his elevated blood pressures.  Denies any elevated blood pressure readings at home.  No chest pain, shortness of breath, difficulty breathing, cough, dizziness, vision changes.

## 2021-11-24 NOTE — ASSESSMENT & PLAN NOTE
Reports that he was being followed by Dr. Smooth Cruz with cardiology.  Currently is taking Xarelto 20 mg with every evening meal and metoprolol SR 50 mg daily.  Has not had any difficulty with either of these medications, denies any blood in his stool.

## 2021-11-24 NOTE — ASSESSMENT & PLAN NOTE
Reports that his hemorrhoids have been much better since applying the cream to help strengthen down.

## 2021-11-24 NOTE — ASSESSMENT & PLAN NOTE
Reports that his arthritis to his left shoulder has resolved since he started to get steroid joint injections.  He has not had any difficulty with the shoulder since that time.

## 2021-11-24 NOTE — PATIENT INSTRUCTIONS
High Triglycerides Eating Plan  Triglycerides are a type of fat in the blood. High levels of triglycerides can increase your risk of heart disease and stroke. If your triglyceride levels are high, choosing the right foods can help lower your triglycerides and keep your heart healthy. Work with your health care provider or a diet and nutrition specialist (dietitian) to develop an eating plan that is right for you.  What are tips for following this plan?  General guidelines    · Lose weight, if you are overweight. For most people, losing 5-10 lbs (2-5 kg) helps lower triglyceride levels. A weight-loss plan may include.  ? 30 minutes of exercise at least 5 days a week.  ? Reducing the amount of calories, sugar, and fat you eat.  · Eat a wide variety of fresh fruits, vegetables, and whole grains. These foods are high in fiber.  · Eat foods that contain healthy fats, such as fatty fish, nuts, seeds, and olive oil.  · Avoid foods that are high in added sugar, added salt (sodium), saturated fat, and trans fat.  · Avoid low-fiber, refined carbohydrates such as white bread, crackers, noodles, and white rice.  · Avoid foods with partially hydrogenated oils (trans fats), such as fried foods or stick margarine.  · Limit alcohol intake to no more than 1 drink a day for nonpregnant women and 2 drinks a day for men. One drink equals 12 oz of beer, 5 oz of wine, or 1½ oz of hard liquor. Your health care provider may recommend that you drink less depending on your overall health.  Reading food labels  · Check food labels for the amount of saturated fat. Choose foods with no or very little saturated fat.  · Check food labels for the amount of trans fat. Choose foods with no trans fat.  · Check food labels for the amount of cholesterol. Choose foods low in cholesterol. Ask your dietitian how much cholesterol you should have each day.  · Check food labels for the amount of sodium. Choose foods with less than 140 milligrams (mg) per  serving.  Shopping  · Buy dairy products labeled as nonfat (skim) or low-fat (1%).  · Avoid buying processed or prepackaged foods. These are often high in added sugar, sodium, and fat.  Cooking  · Choose healthy fats when cooking, such as olive oil or canola oil.  · Cook foods using lower fat methods, such as baking, broiling, boiling, or grilling.  · Make your own sauces, dressings, and marinades when possible, instead of buying them. Store-bought sauces, dressings, and marinades are often high in sodium and sugar.  Meal planning  · Eat more home-cooked food and less restaurant, buffet, and fast food.  · Eat fatty fish at least 2 times each week. Examples of fatty fish include salmon, trout, mackerel, tuna, and herring.  · If you eat whole eggs, do not eat more than 3 egg yolks per week.  What foods are recommended?  The items listed may not be a complete list. Talk with your dietitian about what dietary choices are best for you.  Grains  Whole wheat or whole grain breads, crackers, cereals, and pasta. Unsweetened oatmeal. Bulgur. Barley. Quinoa. Brown rice. Whole wheat flour tortillas.  Vegetables  Fresh or frozen vegetables. Low-sodium canned vegetables.  Fruits  All fresh, canned (in natural juice), or frozen fruits.  Meats and other protein foods  Skinless chicken or turkey. Ground chicken or turkey. Lean cuts of pork, trimmed of fat. Fish and seafood, especially salmon, trout, and herring. Egg whites. Dried beans, peas, or lentils. Unsalted nuts or seeds. Unsalted canned beans. Natural peanut or almond butter.  Dairy  Low-fat dairy products. Skim or low-fat (1%) milk. Reduced fat (2%) and low-sodium cheese. Low-fat ricotta cheese. Low-fat cottage cheese. Plain, low-fat yogurt.  Fats and oils  Tub margarine without trans fats. Light or reduced-fat mayonnaise. Light or reduced-fat salad dressings. Avocado. Safflower, olive, sunflower, soybean, and canola oils.  What foods are not recommended?  The items listed  may not be a complete list. Talk with your dietitian about what dietary choices are best for you.  Grains  White bread. White (regular) pasta. White rice. Cornbread. Bagels. Pastries. Crackers that contain trans fat.  Vegetables  Creamed or fried vegetables. Vegetables in a cheese sauce.  Fruits  Sweetened dried fruit. Canned fruit in syrup. Fruit juice.  Meats and other protein foods  Fatty cuts of meat. Ribs. Chicken wings. Munoz. Sausage. Bologna. Salami. Chitterlings. Fatback. Hot dogs. Bratwurst. Packaged lunch meats.  Dairy  Whole or reduced-fat (2%) milk. Half-and-half. Cream cheese. Full-fat or sweetened yogurt. Full-fat cheese. Nondairy creamers. Whipped toppings. Processed cheese or cheese spreads. Cheese curds.  Beverages  Alcohol. Sweetened drinks, such as soda, lemonade, fruit drinks, or punches.  Fats and oils  Butter. Stick margarine. Lard. Shortening. Ghee. Munoz fat. Tropical oils, such as coconut, palm kernel, or palm oils.  Sweets and desserts  Corn syrup. Sugars. Honey. Molasses. Candy. Jam and jelly. Syrup. Sweetened cereals. Cookies. Pies. Cakes. Donuts. Muffins. Ice cream.  Condiments  Store-bought sauces, dressings, and marinades that are high in sugar, such as ketchup and barbecue sauce.  Summary  · High levels of triglycerides can increase the risk of heart disease and stroke. Choosing the right foods can help lower your triglycerides.  · Eat plenty of fresh fruits, vegetables, and whole grains. Choose low-fat dairy and lean meats. Eat fatty fish at least twice a week.  · Avoid processed and prepackaged foods with added sugar, sodium, saturated fat, and trans fat.  · If you need suggestions or have questions about what types of food are good for you, talk with your health care provider or a dietitian.  This information is not intended to replace advice given to you by your health care provider. Make sure you discuss any questions you have with your health care provider.  Document Released:  10/05/2005 Document Revised: 11/30/2018 Document Reviewed: 02/20/2018  Elsevier Patient Education © 2020 [employer]

## 2021-11-24 NOTE — PROGRESS NOTES
Papo Souza is a pleasant 76 y.o. male here to establish care.    HPI:   Other insomnia  Taking trazodone 50 mg as needed for insomnia symptoms.  Does not take this every evening.  Has not had any difficulty since taking this medication.    Arthritis of left shoulder region  Reports that his arthritis to his left shoulder has resolved since he started to get steroid joint injections.  He has not had any difficulty with the shoulder since that time.    Hemorrhoids  Reports that his hemorrhoids have been much better since applying the cream to help strengthen down.    Dyslipidemia  Patient is currently taking simvastatin 40 mg every evening to help lower his cholesterol levels.  Denies any myalgias.  Last cholesterol level was drawn back in August, showed elevation to his triglyceride levels    Atypical atrial flutter (HCC) seen 12/2019  Reports that he was being followed by Dr. Smooth Cruz with cardiology.  Currently is taking Xarelto 20 mg with every evening meal and metoprolol SR 50 mg daily.  Has not had any difficulty with either of these medications, denies any blood in his stool.    Essential hypertension  Currently taking metoprolol SR 50 mg daily to help with his elevated blood pressures.  Denies any elevated blood pressure readings at home.  No chest pain, shortness of breath, difficulty breathing, cough, dizziness, vision changes.    (HCC) Mild episode of recurrent major depressive disorder  Currently taking Paxil 10 mg daily which is helped to stabilize his depression.  Denies any depressive symptoms at this time.      Health Maintenance  Annual wellness visit: Patient is due    Current medicines (including changes today)  Current Outpatient Medications   Medication Sig Dispense Refill   • NON SPECIFIED C-NITROGLYCERIN 0.125% OINT SIG: 15, Refills: 2. Indiciation for use: Tenderness or swelling of minor hemorrhoids.  Directions for use: Apply to ravinder-rectal area twice a day, as needed. 15  Each 2   • metoprolol SR (TOPROL XL) 50 MG TABLET SR 24 HR      • divalproex (DEPAKOTE) 500 MG Tablet Delayed Response      • rivaroxaban (XARELTO) 20 MG Tab tablet Take 1 Tablet by mouth with dinner. **LAST SEEN 7/2020 .  TO ENSURE FURTHER REFILLS, CALL [phone] TO SCHEDULE FOLLOW UP VISIT.** 90 Tablet 0   • PARoxetine (PAXIL) 10 MG Tab TAKE 1 TABLET BY MOUTH DAILY 90 Tablet 3   • Icosapent Ethyl (VASCEPA) 1 g Cap Take 1 Capsule by mouth 2 times a day. 120 Capsule 1   • simvastatin (ZOCOR) 40 MG Tab TAKE 1 TABLET BY MOUTH EVERY DAY 90 Tablet 0   • chlorhexidine (PERIDEX) 0.12 % Solution RINSE 15 ML S TWICE DAILY AFTER BREAKFAST/BEFORE BEDTIME FOLLOWING BRUSHING AND FLOSSING     • ibuprofen (MOTRIN) 800 MG Tab Take 800 mg by mouth.     • traZODone (DESYREL) 50 MG Tab TAKE 1 TABLET BY MOUTH AT BEDTIME AS NEEDED FOR SLEEP 90 tablet 1     No current facility-administered medications for this visit.       Past Medical/ Surgical History  He  has a past medical history of Arrhythmia (2011), Arthritis of left shoulder region (9/14/2018), Atypical atrial flutter (HCC) seen 12/2019, Chronic anticoagulation, H/O cocaine use (10/12/2013), H/O left inguinal hernia (3/26/2013), Heart burn, High cholesterol, Hypertension, Pneumonia (2018), Renal disorder (2011), and Right hydrocele (11/30/2018).  He  has a past surgical history that includes other (2011); gastroscopy-endo (10/13/2013); colonoscopy; colonoscopy (N/A, 9/10/2018); inguinal hernia repair (3/26/2013); and pr colonoscopy,diagnostic (5/26/2020).    Social History  Social History     Tobacco Use   • Smoking status: Former Smoker     Packs/day: 0.60     Years: 50.00     Pack years: 30.00     Types: Cigarettes     Quit date: 10/1/2011     Years since quitting: 10.1   • Smokeless tobacco: Never Used   Vaping Use   • Vaping Use: Never used   Substance Use Topics   • Alcohol use: Not Currently     Comment: quit 2011 (hx heavy use 1 yr)   • Drug use: Not Currently      "Comment: History of cocaine use quit      Social History     Social History Narrative   • Not on file        Family History  Family History   Problem Relation Age of Onset   • No Known Problems Mother    • No Known Problems Father    • No Known Problems Brother    • No Known Problems Brother      Family Status   Relation Name Status   • Mo   at age 98   • Fa   at age 90   • Bro  Alive   • Bro  Alive         Review of Systems   Constitutional: Negative for chills, fever, malaise/fatigue and weight loss.   HENT: Negative for hearing loss and sore throat.    Eyes: Negative for blurred vision and double vision.   Respiratory: Negative for cough and shortness of breath.    Cardiovascular: Negative for chest pain, palpitations and leg swelling.   Gastrointestinal: Negative for abdominal pain, constipation, diarrhea, nausea and vomiting.   Musculoskeletal: Negative for myalgias.   Skin: Negative for rash.   Neurological: Negative for dizziness, tingling and weakness.   Endo/Heme/Allergies: Does not bruise/bleed easily.   Psychiatric/Behavioral: Negative for depression. The patient is not nervous/anxious.          Objective:     /78 (BP Location: Right arm, Patient Position: Sitting, BP Cuff Size: Adult)   Pulse 90   Temp 36.9 °C (98.4 °F) (Temporal)   Ht 1.753 m (5' 9\")   Wt 81.2 kg (179 lb 0.2 oz)   SpO2 95%  Body mass index is 26.44 kg/m².    Physical Exam  Constitutional:       General: He is not in acute distress.  HENT:      Head: Normocephalic and atraumatic.      Right Ear: External ear normal.      Left Ear: External ear normal.   Eyes:      General: Lids are normal.      Extraocular Movements: Extraocular movements intact.      Conjunctiva/sclera: Conjunctivae normal.      Pupils: Pupils are equal, round, and reactive to light.   Neck:      Trachea: Trachea normal.   Cardiovascular:      Rate and Rhythm: Normal rate and regular rhythm.      Heart sounds: Normal heart sounds. No " murmur heard.  No friction rub. No gallop.    Pulmonary:      Effort: Pulmonary effort is normal. No accessory muscle usage.      Breath sounds: Normal breath sounds. No wheezing or rales.   Abdominal:      General: Bowel sounds are normal.      Palpations: Abdomen is soft.      Tenderness: There is no abdominal tenderness.   Musculoskeletal:      Cervical back: Normal range of motion and neck supple.      Right lower leg: No edema.      Left lower leg: No edema.   Lymphadenopathy:      Cervical: No cervical adenopathy.   Skin:     General: Skin is warm and dry.      Findings: No rash.   Neurological:      General: No focal deficit present.      Mental Status: He is alert and oriented to person, place, and time.      GCS: GCS eye subscore is 4. GCS verbal subscore is 5. GCS motor subscore is 6.      Gait: Gait is intact.      Deep Tendon Reflexes:      Reflex Scores:       Brachioradialis reflexes are 2+ on the right side and 2+ on the left side.       Patellar reflexes are 2+ on the right side and 2+ on the left side.  Psychiatric:         Attention and Perception: Attention normal.         Mood and Affect: Affect normal.         Speech: Speech normal.        Imaging:  No recent imaging to review    Labs:  Most recent labs from 09/03/2021 reviewed with patient.  Assessment and Plan:   The following treatment plan was discussed    1. Dyslipidemia  Chronic, unstable.  I discussed with the patient about the importance of being mindful of the foods that he is eating as well as making sure he is getting plenty of fruits and vegetables and increasing physical activity.  I did provide him with information regarding a healthy diet plan to help reduce his triglyceride levels.  We will go ahead and repeat his lipid profile to ensure improvement of this.  - Lipid Profile; Future    2. Atypical atrial flutter (HCC) seen 12/2019  Chronic, stable.  I recommended that the patient call his cardiologist to continue his annual  visits as he continues to be on the Xarelto.    3. Essential hypertension  Chronic, stable.  Recommended that he continue taking his current medication regime to help control his blood pressure.    4. Other insomnia  Chronic, stable.  I recommended the patient continue taking his trazodone which is very helpful for his insomnia symptoms.    5. Hemorrhoids, unspecified hemorrhoid type  Chronic, stable.  Patient was requesting a refill of his medication, this was provided for the patient.  Was unable to send the medication electronically to the compounding pharmacy, paper prescription provided for the patient.  - NON SPECIFIED; C-NITROGLYCERIN 0.125% OINT SIG: 15, Refills: 2. Indiciation for use: Tenderness or swelling of minor hemorrhoids.  Directions for use: Apply to ravinder-rectal area twice a day, as needed.  Dispense: 15 Each; Refill: 2    6. Prediabetes  Chronic, stable.  I recommended the patient continue with his dietary changes.  We will going to repeat his chemistry, GFR, and A1c.  - Comp Metabolic Panel; Future  - ESTIMATED GFR; Future  - HEMOGLOBIN A1C; Future    7. 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  I recommend that the patient continue taking his current medication to help control his depression.    8. Screening for deficiency anemia  - CBC WITH DIFFERENTIAL; Future    9. Encounter for vitamin deficiency screening  - VITAMIN D,25 HYDROXY; Future    Records requested.  Followup: Return in about 6 months (around 5/24/2022), or if symptoms worsen or fail to improve, for annual wellness.    Please note that this dictation was created using voice recognition software. I have made every reasonable attempt to correct obvious errors, but I expect that there are errors of grammar and possibly content that I did not discover before finalizing the note.

## 2021-11-24 NOTE — ASSESSMENT & PLAN NOTE
Currently taking Paxil 10 mg daily which is helped to stabilize his depression.  Denies any depressive symptoms at this time.

## 2021-12-02 DIAGNOSIS — E78.5 HYPERLIPIDEMIA, UNSPECIFIED HYPERLIPIDEMIA TYPE: ICD-10-CM

## 2021-12-02 RX ORDER — SIMVASTATIN 40 MG
TABLET ORAL
Qty: 45 TABLET | Refills: 1 | Status: SHIPPED | OUTPATIENT
Start: 2021-12-02 | End: 2022-01-10

## 2021-12-02 NOTE — TELEPHONE ENCOUNTER
Received request via: Pharmacy    Was the patient seen in the last year in this department? Yes  11/24/2021  Does the patient have an active prescription (recently filled or refills available) for medication(s) requested? No

## 2022-01-05 DIAGNOSIS — E78.5 DYSLIPIDEMIA: Chronic | ICD-10-CM

## 2022-01-05 RX ORDER — ICOSAPENT ETHYL 1000 MG/1
CAPSULE ORAL
Qty: 120 CAPSULE | Refills: 1 | Status: SHIPPED | OUTPATIENT
Start: 2022-01-05

## 2022-01-05 NOTE — TELEPHONE ENCOUNTER
Received request via: Pharmacy    Was the patient seen in the last year in this department? Yes    Does the patient have an active prescription (recently filled or refills available) for medication(s) requested? No     Requested Prescriptions     Pending Prescriptions Disp Refills   • VASCEPA 1 g Cap [Pharmacy Med Name: VASCEPA 1 GM CAPSULE] 120 Capsule 1     Sig: TAKE 1 CAPSULE BY MOUTH TWICE A DAY

## 2022-01-08 DIAGNOSIS — E78.5 HYPERLIPIDEMIA, UNSPECIFIED HYPERLIPIDEMIA TYPE: ICD-10-CM

## 2022-01-10 RX ORDER — SIMVASTATIN 40 MG
TABLET ORAL
Qty: 90 TABLET | Refills: 3 | Status: SHIPPED | OUTPATIENT
Start: 2022-01-10 | End: 2022-07-21

## 2022-01-10 NOTE — TELEPHONE ENCOUNTER
Received request via: Pharmacy    Was the patient seen in the last year in this department? Yes    Does the patient have an active prescription (recently filled or refills available) for medication(s) requested? No     Requested Prescriptions     Pending Prescriptions Disp Refills   • simvastatin (ZOCOR) 40 MG Tab [Pharmacy Med Name: SIMVASTATIN 40 MG TABLET] 90 Tablet      Sig: TAKE 1 TABLET BY MOUTH EVERY DAY

## 2022-02-20 DIAGNOSIS — I48.0 PAF (PAROXYSMAL ATRIAL FIBRILLATION) (HCC): ICD-10-CM

## 2022-02-22 ENCOUNTER — OFFICE VISIT (OUTPATIENT)
Dept: CARDIOLOGY | Facility: MEDICAL CENTER | Age: 77
End: 2022-02-22
Payer: MEDICARE

## 2022-02-22 VITALS
BODY MASS INDEX: 25.91 KG/M2 | RESPIRATION RATE: 14 BRPM | HEART RATE: 62 BPM | SYSTOLIC BLOOD PRESSURE: 122 MMHG | DIASTOLIC BLOOD PRESSURE: 76 MMHG | WEIGHT: 181 LBS | OXYGEN SATURATION: 97 % | HEIGHT: 70 IN

## 2022-02-22 DIAGNOSIS — Z79.01 CHRONIC ANTICOAGULATION: Chronic | ICD-10-CM

## 2022-02-22 DIAGNOSIS — E78.5 DYSLIPIDEMIA: Chronic | ICD-10-CM

## 2022-02-22 DIAGNOSIS — E78.1 HYPERTRIGLYCERIDEMIA: Chronic | ICD-10-CM

## 2022-02-22 DIAGNOSIS — I10 ESSENTIAL HYPERTENSION: ICD-10-CM

## 2022-02-22 DIAGNOSIS — I48.0 PAF (PAROXYSMAL ATRIAL FIBRILLATION) (HCC): ICD-10-CM

## 2022-02-22 DIAGNOSIS — I48.4 ATYPICAL ATRIAL FLUTTER (HCC): Chronic | ICD-10-CM

## 2022-02-22 PROCEDURE — 99214 OFFICE O/P EST MOD 30 MIN: CPT | Performed by: INTERNAL MEDICINE

## 2022-02-22 RX ORDER — RIVAROXABAN 20 MG/1
TABLET, FILM COATED ORAL
Qty: 90 TABLET | Refills: 0 | OUTPATIENT
Start: 2022-02-22

## 2022-02-22 ASSESSMENT — FIBROSIS 4 INDEX: FIB4 SCORE: 1.51

## 2022-02-22 NOTE — PROGRESS NOTES
Chief Complaint   Patient presents with   • Atrial Fibrillation     F/V Dx:     PAF (paroxysmal atrial fibrillation) (HCC)     • Atrial Flutter     F/V Dx: Atypical atrial flutter (HCC) seen 12/2019       Subjective     Roland Souza is a 76 y.o. male who presents today for follow-up of his history of hypertriglyceridemia A. fib on anticoagulation    He is doing well the past year    Triglycerides are elevated compared to prior with fenofibrate but still acceptable lipids are better than ever    Past Medical History:   Diagnosis Date   • Arrhythmia 2011    on metoprolol; hx of afib   • Arthritis of left shoulder region 9/14/2018    Responded well to L glenohumeral joint steroid injection   • Atypical atrial flutter (HCC) seen 12/2019    • Chronic anticoagulation    • H/O cocaine use 10/12/2013   • H/O left inguinal hernia 3/26/2013     ICD-10 transition   • Heart burn    • High cholesterol    • Hypertension    • Pneumonia 2018   • Renal disorder 2011    had short term kidney failure, with 3 dialysis treatments   • Right hydrocele 11/30/2018     Past Surgical History:   Procedure Laterality Date   • ID COLONOSCOPY,DIAGNOSTIC  5/26/2020    Procedure: COLONOSCOPY - WITH POSSIBLE BIOPSY, DILATION, POLYPECTOMY, CONTROL OF HEMORRHAGE;  Surgeon: Migel Cordero M.D.;  Location: SURGERY [facility];  Service: Gastroenterology   • COLONOSCOPY N/A 9/10/2018    Procedure: COLONOSCOPY;  Surgeon: Migel Cordero M.D.;  Location: SURGERY SAME DAY [facility];  Service: Gastroenterology   • GASTROSCOPY-ENDO  10/13/2013    Performed by Oseas Madden M.D. at ENDOSCOPY [facility]   • INGUINAL HERNIA REPAIR  3/26/2013    Performed by Felipe Carter M.D. at SURGERY SAME DAY [facility]   • OTHER  2011    dialysis cath insertion and removal   • COLONOSCOPY       Family History   Problem Relation Age of Onset   • No Known Problems Mother    • No Known Problems Father    • No Known Problems Brother    • No Known  Problems Brother      Social History     Socioeconomic History   • Marital status:      Spouse name: Not on file   • Number of children: Not on file   • Years of education: Not on file   • Highest education level: Not on file   Occupational History   • Not on file   Tobacco Use   • Smoking status: Former Smoker     Packs/day: 0.60     Years: 50.00     Pack years: 30.00     Types: Cigarettes     Quit date: 10/1/2011     Years since quitting: 10.4   • Smokeless tobacco: Never Used   Vaping Use   • Vaping Use: Never used   Substance and Sexual Activity   • Alcohol use: Not Currently     Comment: quit 2011 (hx heavy use 1 yr)   • Drug use: Not Currently     Comment: History of cocaine use quit 2011   • Sexual activity: Yes     Partners: Female     Comment:    Other Topics Concern   • Not on file   Social History Narrative   • Not on file     Social Determinants of Health     Financial Resource Strain: Not on file   Food Insecurity: Not on file   Transportation Needs: Not on file   Physical Activity: Not on file   Stress: Not on file   Social Connections: Not on file   Intimate Partner Violence: Not on file   Housing Stability: Not on file     Allergies   Allergen Reactions   • Amoxicillin Diarrhea     Diarrhea       Outpatient Encounter Medications as of 2/22/2022   Medication Sig Dispense Refill   • traZODone (DESYREL) 50 MG Tab TAKE 1 TABLET BY MOUTH AT BEDTIME AS NEEDED FOR SLEEP 90 Tablet 3   • simvastatin (ZOCOR) 40 MG Tab TAKE 1 TABLET BY MOUTH EVERY DAY 90 Tablet 3   • VASCEPA 1 g Cap TAKE 1 CAPSULE BY MOUTH TWICE A  Capsule 1   • metoprolol SR (TOPROL XL) 50 MG TABLET SR 24 HR      • divalproex (DEPAKOTE) 500 MG Tablet Delayed Response      • NON SPECIFIED C-NITROGLYCERIN 0.125% OINT SIG: 15, Refills: 2. Indiciation for use: Tenderness or swelling of minor hemorrhoids.  Directions for use: Apply to ravinder-rectal area twice a day, as needed. 15 Each 2   • rivaroxaban (XARELTO) 20 MG Tab tablet  "Take 1 Tablet by mouth with dinner. **LAST SEEN 7/2020 .  TO ENSURE FURTHER REFILLS, CALL [phone] TO SCHEDULE FOLLOW UP VISIT.** 90 Tablet 0   • PARoxetine (PAXIL) 10 MG Tab TAKE 1 TABLET BY MOUTH DAILY 90 Tablet 3   • [DISCONTINUED] chlorhexidine (PERIDEX) 0.12 % Solution RINSE 15 ML S TWICE DAILY AFTER BREAKFAST/BEFORE BEDTIME FOLLOWING BRUSHING AND FLOSSING (Patient not taking: Reported on 2/22/2022)     • [DISCONTINUED] ibuprofen (MOTRIN) 800 MG Tab Take 800 mg by mouth. (Patient not taking: Reported on 2/22/2022)       No facility-administered encounter medications on file as of 2/22/2022.     ROS           Objective     /76 (BP Location: Left arm, Patient Position: Sitting, BP Cuff Size: Adult)   Pulse 62   Resp 14   Ht 1.778 m (5' 10\")   Wt 82.1 kg (181 lb)   SpO2 97%   BMI 25.97 kg/m²     Physical Exam  Constitutional:       General: He is not in acute distress.     Appearance: He is not diaphoretic.   Eyes:      General: No scleral icterus.  Neck:      Vascular: No JVD.   Cardiovascular:      Rate and Rhythm: Normal rate.      Heart sounds: Normal heart sounds. No murmur heard.    No friction rub. No gallop.   Pulmonary:      Effort: No respiratory distress.      Breath sounds: No wheezing or rales.   Abdominal:      General: Bowel sounds are normal.      Palpations: Abdomen is soft.   Skin:     Findings: No rash.   Neurological:      Mental Status: He is alert.            We reviewed in person the most recent labs  Recent Results (from the past 5040 hour(s))   ALKALINE PHOSPHATASE ISOENZYMES    Collection Time: 09/03/21  6:23 AM   Result Value Ref Range    Alkaline Phosphatase 94 40 - 120 U/L    Liver Fractions 56 0 - 94 U/L    Bone Fractions 38 0 - 55 U/L    Alk Phos Other Calc 0 U/L   HEPATIC FUNCTION PANEL    Collection Time: 09/03/21  6:23 AM   Result Value Ref Range    Alkaline Phosphatase 87 30 - 99 U/L    AST(SGOT) 16 12 - 45 U/L    ALT(SGPT) 13 2 - 50 U/L    Total Bilirubin 0.3 0.1 " - 1.5 mg/dL    Direct Bilirubin <0.2 0.1 - 0.5 mg/dL    Indirect Bilirubin see below 0.0 - 1.0 mg/dL    Albumin 4.1 3.2 - 4.9 g/dL    Total Protein 6.7 6.0 - 8.2 g/dL   Lipid Profile    Collection Time: 09/03/21  6:23 AM   Result Value Ref Range    Cholesterol,Tot 158 100 - 199 mg/dL    Triglycerides 348 (H) 0 - 149 mg/dL    HDL 32 (A) >=40 mg/dL    LDL 56 <100 mg/dL   FASTING STATUS    Collection Time: 09/03/21  6:23 AM   Result Value Ref Range    Fasting Status Fasting          Assessment & Plan     1. PAF (paroxysmal atrial fibrillation) (HCC)  rivaroxaban (XARELTO) 20 MG Tab tablet   2. Atypical atrial flutter (HCC) seen 12/2019     3. Essential hypertension     4. Chronic anticoagulation     5. Dyslipidemia     6. Hypertriglyceridemia         Medical Decision Making: Today's Assessment/Status/Plan:        It was my pleasure to meet with Mr. Souza.    We addressed the management of hypertension at today's visit. Blood pressure is well controlled.  We specifically assessed the labs on hypertension treatment    We addressed the management of dyslipidemia at today's visit. He is on appropriate statin.    We addressed the management of chronic anticoagulation at today's visit. He understands the risks and benefits of chronic anticoagulation.  We reviewed and verified the results of annual testing for anemia and kidney function.    Reasonable to reduce metoprolol    We addressed the management of hypertriglyceridemia at today's visit. Vascepa or Fish oil (if Vascepa too expensive) for elevated triglycerides (REDUCE IT TRIAL SHOWED reduction from 22% 5 year MACE to 17%).    I will see Mr. Souza back in 1 year time and encouraged him to follow up with us over the phone or electronically using my MyChart as issues arise.    It is my pleasure to participate in the care of Mr. Souza.  Please do not hesitate to contact me with questions or concerns.    Smooth Cruz MD PhD [facility]  Cardiologist [facility]  Heart and Vascular Health    Please note that this dictation was created using voice recognition software. There may be errors I did not discover before finalizing the note.

## 2022-02-23 ENCOUNTER — OFFICE VISIT (OUTPATIENT)
Dept: URGENT CARE | Facility: CLINIC | Age: 77
End: 2022-02-23
Payer: MEDICARE

## 2022-02-23 ENCOUNTER — APPOINTMENT (OUTPATIENT)
Dept: RADIOLOGY | Facility: IMAGING CENTER | Age: 77
End: 2022-02-23
Attending: PHYSICIAN ASSISTANT
Payer: MEDICARE

## 2022-02-23 VITALS
TEMPERATURE: 97.2 F | OXYGEN SATURATION: 97 % | BODY MASS INDEX: 25.91 KG/M2 | HEART RATE: 80 BPM | RESPIRATION RATE: 20 BRPM | WEIGHT: 181 LBS | HEIGHT: 70 IN | DIASTOLIC BLOOD PRESSURE: 86 MMHG | SYSTOLIC BLOOD PRESSURE: 128 MMHG

## 2022-02-23 DIAGNOSIS — S29.011A INTERCOSTAL MUSCLE STRAIN, INITIAL ENCOUNTER: ICD-10-CM

## 2022-02-23 DIAGNOSIS — R07.81 RIB PAIN ON LEFT SIDE: ICD-10-CM

## 2022-02-23 PROCEDURE — 99214 OFFICE O/P EST MOD 30 MIN: CPT | Performed by: PHYSICIAN ASSISTANT

## 2022-02-23 PROCEDURE — 71101 X-RAY EXAM UNILAT RIBS/CHEST: CPT | Mod: TC,FY,LT | Performed by: PHYSICIAN ASSISTANT

## 2022-02-23 RX ORDER — CYCLOBENZAPRINE HCL 10 MG
10 TABLET ORAL 2 TIMES DAILY PRN
Qty: 30 TABLET | Refills: 0 | Status: SHIPPED | OUTPATIENT
Start: 2022-02-23 | End: 2022-06-16

## 2022-02-23 ASSESSMENT — ENCOUNTER SYMPTOMS
ABDOMINAL PAIN: 0
MYALGIAS: 1
DIZZINESS: 0
BACK PAIN: 1
PALPITATIONS: 0
HEMOPTYSIS: 0
SHORTNESS OF BREATH: 0
VOMITING: 0
NAUSEA: 0
HEADACHES: 0
FEVER: 0
ROS SKIN COMMENTS: NO BRUISING
WHEEZING: 0
CHILLS: 0
FALLS: 0
COUGH: 0

## 2022-02-23 ASSESSMENT — PAIN SCALES - GENERAL: PAINLEVEL: 8=MODERATE-SEVERE PAIN

## 2022-02-23 ASSESSMENT — FIBROSIS 4 INDEX: FIB4 SCORE: 1.51

## 2022-02-23 NOTE — PROGRESS NOTES
Subjective:   Papo Souza is a 76 y.o. male who presents for Back Pain (Lt side of mid back pain, as he was reaching for something on Sunday, worsening, feels bruised)      HPI:  This is a very pleasant 76-year-old male with a past medical history significant for hypertension, A. fib and dyslipidemia.  He is presenting to clinic today with left-sided rib pain x3 to 4 days.  Patient is unaware of any specific inciting event.  Believes pain may have started when he was reaching above shoulder  level for an item.  He also describes coughing last week that also may have  aggravated it.  Pain has been constant and severe.  Pain made worse with any palpation, sneezing, deep breathing or movement.  Believes he feels overlying swelling.  Has not noticed any discoloration.  No cough or sputum production.  He does not feel ill.  No chest pain or pressure.  Has been using ice with no improvement.      Review of Systems   Constitutional: Negative for chills, fever and malaise/fatigue.   Respiratory: Negative for cough, hemoptysis, shortness of breath and wheezing.    Cardiovascular: Negative for chest pain and palpitations.   Gastrointestinal: Negative for abdominal pain, nausea and vomiting.   Musculoskeletal: Positive for back pain and myalgias. Negative for falls.   Skin:        No bruising   Neurological: Negative for dizziness and headaches.       Medications:    • divalproex Tbec  • metoprolol SR Tb24  • NON SPECIFIED  • PARoxetine Tabs  • rivaroxaban Tabs  • simvastatin Tabs  • traZODone Tabs  • Vascepa Caps    Allergies: Amoxicillin    Problem List: Papo Souza does not have any pertinent problems on file.    Surgical History:  Past Surgical History:   Procedure Laterality Date   • GA COLONOSCOPY,DIAGNOSTIC  5/26/2020    Procedure: COLONOSCOPY - WITH POSSIBLE BIOPSY, DILATION, POLYPECTOMY, CONTROL OF HEMORRHAGE;  Surgeon: Migel Cordero M.D.;  Location: SURGERY [facility];  Service:  "Gastroenterology   • COLONOSCOPY N/A 9/10/2018    Procedure: COLONOSCOPY;  Surgeon: Migel Cordero M.D.;  Location: SURGERY SAME DAY [facility];  Service: Gastroenterology   • GASTROSCOPY-ENDO  10/13/2013    Performed by Oseas Madden M.D. at ENDOSCOPY [facility]S   • INGUINAL HERNIA REPAIR  3/26/2013    Performed by Felipe Carter M.D. at SURGERY SAME DAY [facility] ORS   • OTHER  2011    dialysis cath insertion and removal   • COLONOSCOPY         Past Social Hx: Papo Souza  reports that he quit smoking about 10 years ago. His smoking use included cigarettes. He has a 30.00 pack-year smoking history. He has never used smokeless tobacco. He reports previous alcohol use. He reports previous drug use.     Past Family Hx:  Papo Souza family history includes No Known Problems in his brother, brother, father, and mother.     Problem list, medications, and allergies reviewed by myself today in Epic.     Objective:     /86 (BP Location: Right arm, Patient Position: Sitting, BP Cuff Size: Adult)   Pulse 80   Temp 36.2 °C (97.2 °F) (Temporal)   Resp 20   Ht 1.778 m (5' 10\")   Wt 82.1 kg (181 lb)   SpO2 97%   BMI 25.97 kg/m²     Physical Exam  Constitutional:       General: He is not in acute distress.     Appearance: Normal appearance. He is not ill-appearing, toxic-appearing or diaphoretic.   HENT:      Head: Normocephalic and atraumatic.      Right Ear: Tympanic membrane, ear canal and external ear normal.      Left Ear: Tympanic membrane, ear canal and external ear normal.      Nose: Nose normal. No congestion or rhinorrhea.      Mouth/Throat:      Mouth: Mucous membranes are moist.      Pharynx: No oropharyngeal exudate or posterior oropharyngeal erythema.   Eyes:      Conjunctiva/sclera: Conjunctivae normal.   Cardiovascular:      Rate and Rhythm: Normal rate and regular rhythm.      Pulses: Normal pulses.      Heart sounds: Normal heart sounds.   Pulmonary:      Effort: " Pulmonary effort is normal.      Breath sounds: Normal breath sounds. No wheezing.   Musculoskeletal:        Arms:       Cervical back: Normal range of motion. No muscular tenderness.      Comments: Left rib cage.  Patient is tender to palpation to the lateral aspect of the left rib cage predominantly over the lower ribs.  Does seem to be muscle tightness and spasm over the latissimus dorsi.  No palpable deformity.  No crepitus.  Underlying lung sounds clear to auscultation.   Lymphadenopathy:      Cervical: No cervical adenopathy.   Skin:     General: Skin is warm and dry.      Capillary Refill: Capillary refill takes less than 2 seconds.   Neurological:      Mental Status: He is alert.   Psychiatric:         Mood and Affect: Mood normal.         Thought Content: Thought content normal.       RADIOLOGY RESULTS   [id number] (WITH 1-VIEW CXR) LEFT    Result Date: 2/23/2022 2/23/2022 8:30 AM HISTORY/REASON FOR EXAM:  LEFT-sided chest wall pain, no history of trauma. TECHNIQUE/EXAM DESCRIPTION AND NUMBER OF VIEWS:  5 images of the left ribs and chest. COMPARISON: Chest radiograph 10/13/2013 FINDINGS: No fractures or acute bony changes are noted.  There is no evidence of a hemo or pneumothorax.     No displaced LEFT rib fracture or evidence of acute intrathoracic abnormality           Assessment/Plan:     Comments/MDM:     • Patient with atraumatic left rib pain x3 to 4 days.  No direct injury or trauma.  X-ray was preformed with no evidence of fracture or intrathoracic abnormality.  Underlying lung sounds clear to auscultation.  Likely muscle strain after golfing last week.  Supportive recommendations were discussed at this time.  Alternate heat/ice.  Deep breathing exercises encouraged.  Flexeril for the evening.  Discussed sedative effects of this medication.  Do not drive while taking.  Topical diclofenac and Tylenol for pain.  Please call with any questions or concerns.  Return for any persistence of  symptoms.     Diagnosis and associated orders:     1. Intercostal muscle strain, initial encounter    - cyclobenzaprine (FLEXERIL) 10 mg Tab; Take 1 Tablet by mouth 2 times a day as needed for Moderate Pain or Muscle Spasms.  Dispense: 30 Tablet; Refill: 0  - diclofenac sodium (VOLTAREN) 1 % Gel; Apply 2 g topically 4 times a day as needed.  Dispense: 100 g; Refill: 0    2. Rib pain on left side    - [id number] (WITH 1-VIEW CXR) LEFT; Future  - cyclobenzaprine (FLEXERIL) 10 mg Tab; Take 1 Tablet by mouth 2 times a day as needed for Moderate Pain or Muscle Spasms.  Dispense: 30 Tablet; Refill: 0  - diclofenac sodium (VOLTAREN) 1 % Gel; Apply 2 g topically 4 times a day as needed.  Dispense: 100 g; Refill: 0           Differential diagnosis, natural history, supportive care, and indications for immediate follow-up discussed.    I personally reviewed prior external notes and test results pertinent to today's visit.     Advised the patient to follow-up with the primary care physician for recheck, reevaluation, and consideration of further management.    Please note that this dictation was created using voice recognition software. I have made reasonable attempt to correct obvious errors, but I expect that there are errors of grammar and possibly content that I did not discover before finalizing the note.    This note was electronically signed by DARIELA Kendall PA-C

## 2022-06-14 ENCOUNTER — HOSPITAL ENCOUNTER (OUTPATIENT)
Dept: LAB | Facility: MEDICAL CENTER | Age: 77
End: 2022-06-14
Attending: FAMILY MEDICINE
Payer: MEDICARE

## 2022-06-14 DIAGNOSIS — E78.5 DYSLIPIDEMIA: ICD-10-CM

## 2022-06-14 DIAGNOSIS — Z13.0 SCREENING FOR DEFICIENCY ANEMIA: ICD-10-CM

## 2022-06-14 DIAGNOSIS — Z13.21 ENCOUNTER FOR VITAMIN DEFICIENCY SCREENING: ICD-10-CM

## 2022-06-14 DIAGNOSIS — R73.03 PREDIABETES: ICD-10-CM

## 2022-06-14 LAB
25(OH)D3 SERPL-MCNC: 42 NG/ML (ref 30–100)
ALBUMIN SERPL BCP-MCNC: 4 G/DL (ref 3.2–4.9)
ALBUMIN/GLOB SERPL: 1.6 G/DL
ALP SERPL-CCNC: 80 U/L (ref 30–99)
ALT SERPL-CCNC: 25 U/L (ref 2–50)
ANION GAP SERPL CALC-SCNC: 14 MMOL/L (ref 7–16)
AST SERPL-CCNC: 23 U/L (ref 12–45)
BASOPHILS # BLD AUTO: 0.4 % (ref 0–1.8)
BASOPHILS # BLD: 0.02 K/UL (ref 0–0.12)
BILIRUB SERPL-MCNC: 0.2 MG/DL (ref 0.1–1.5)
BUN SERPL-MCNC: 19 MG/DL (ref 8–22)
CALCIUM SERPL-MCNC: 8.8 MG/DL (ref 8.5–10.5)
CHLORIDE SERPL-SCNC: 105 MMOL/L (ref 96–112)
CHOLEST SERPL-MCNC: 219 MG/DL (ref 100–199)
CO2 SERPL-SCNC: 22 MMOL/L (ref 20–33)
CREAT SERPL-MCNC: 0.78 MG/DL (ref 0.5–1.4)
EOSINOPHIL # BLD AUTO: 0.01 K/UL (ref 0–0.51)
EOSINOPHIL NFR BLD: 0.2 % (ref 0–6.9)
ERYTHROCYTE [DISTWIDTH] IN BLOOD BY AUTOMATED COUNT: 49.5 FL (ref 35.9–50)
EST. AVERAGE GLUCOSE BLD GHB EST-MCNC: 128 MG/DL
FASTING STATUS PATIENT QL REPORTED: NORMAL
GFR SERPLBLD CREATININE-BSD FMLA CKD-EPI: 93 ML/MIN/1.73 M 2
GLOBULIN SER CALC-MCNC: 2.5 G/DL (ref 1.9–3.5)
GLUCOSE SERPL-MCNC: 96 MG/DL (ref 65–99)
HBA1C MFR BLD: 6.1 % (ref 4–5.6)
HCT VFR BLD AUTO: 45.1 % (ref 42–52)
HDLC SERPL-MCNC: 72 MG/DL
HGB BLD-MCNC: 14.7 G/DL (ref 14–18)
IMM GRANULOCYTES # BLD AUTO: 0.06 K/UL (ref 0–0.11)
IMM GRANULOCYTES NFR BLD AUTO: 1.2 % (ref 0–0.9)
LDLC SERPL CALC-MCNC: 92 MG/DL
LYMPHOCYTES # BLD AUTO: 1.35 K/UL (ref 1–4.8)
LYMPHOCYTES NFR BLD: 26.2 % (ref 22–41)
MCH RBC QN AUTO: 29.6 PG (ref 27–33)
MCHC RBC AUTO-ENTMCNC: 32.6 G/DL (ref 33.7–35.3)
MCV RBC AUTO: 90.9 FL (ref 81.4–97.8)
MONOCYTES # BLD AUTO: 0.48 K/UL (ref 0–0.85)
MONOCYTES NFR BLD AUTO: 9.3 % (ref 0–13.4)
NEUTROPHILS # BLD AUTO: 3.23 K/UL (ref 1.82–7.42)
NEUTROPHILS NFR BLD: 62.7 % (ref 44–72)
NRBC # BLD AUTO: 0 K/UL
NRBC BLD-RTO: 0 /100 WBC
PLATELET # BLD AUTO: 210 K/UL (ref 164–446)
PMV BLD AUTO: 9.2 FL (ref 9–12.9)
POTASSIUM SERPL-SCNC: 3.7 MMOL/L (ref 3.6–5.5)
PROT SERPL-MCNC: 6.5 G/DL (ref 6–8.2)
RBC # BLD AUTO: 4.96 M/UL (ref 4.7–6.1)
SODIUM SERPL-SCNC: 141 MMOL/L (ref 135–145)
TRIGL SERPL-MCNC: 273 MG/DL (ref 0–149)
WBC # BLD AUTO: 5.2 K/UL (ref 4.8–10.8)

## 2022-06-14 PROCEDURE — 83036 HEMOGLOBIN GLYCOSYLATED A1C: CPT | Mod: GA

## 2022-06-14 PROCEDURE — 80061 LIPID PANEL: CPT

## 2022-06-14 PROCEDURE — 80053 COMPREHEN METABOLIC PANEL: CPT

## 2022-06-14 PROCEDURE — 82306 VITAMIN D 25 HYDROXY: CPT | Mod: GA

## 2022-06-14 PROCEDURE — 36415 COLL VENOUS BLD VENIPUNCTURE: CPT | Mod: GA

## 2022-06-14 PROCEDURE — 85025 COMPLETE CBC W/AUTO DIFF WBC: CPT

## 2022-06-16 ENCOUNTER — OFFICE VISIT (OUTPATIENT)
Dept: MEDICAL GROUP | Facility: MEDICAL CENTER | Age: 77
End: 2022-06-16
Payer: MEDICARE

## 2022-06-16 VITALS
TEMPERATURE: 98 F | WEIGHT: 171.96 LBS | BODY MASS INDEX: 24.62 KG/M2 | RESPIRATION RATE: 17 BRPM | SYSTOLIC BLOOD PRESSURE: 150 MMHG | DIASTOLIC BLOOD PRESSURE: 70 MMHG | HEART RATE: 87 BPM | HEIGHT: 70 IN | OXYGEN SATURATION: 94 %

## 2022-06-16 DIAGNOSIS — Z13.0 SCREENING FOR DEFICIENCY ANEMIA: ICD-10-CM

## 2022-06-16 DIAGNOSIS — R73.03 PREDIABETES: ICD-10-CM

## 2022-06-16 DIAGNOSIS — F33.0 MILD EPISODE OF RECURRENT MAJOR DEPRESSIVE DISORDER (HCC): ICD-10-CM

## 2022-06-16 DIAGNOSIS — I48.0 PAF (PAROXYSMAL ATRIAL FIBRILLATION) (HCC): ICD-10-CM

## 2022-06-16 DIAGNOSIS — R91.1 LUNG NODULE: ICD-10-CM

## 2022-06-16 DIAGNOSIS — Z00.00 INITIAL MEDICARE ANNUAL WELLNESS VISIT: Primary | ICD-10-CM

## 2022-06-16 DIAGNOSIS — E78.5 DYSLIPIDEMIA: ICD-10-CM

## 2022-06-16 DIAGNOSIS — I10 ESSENTIAL HYPERTENSION: ICD-10-CM

## 2022-06-16 PROCEDURE — 99214 OFFICE O/P EST MOD 30 MIN: CPT | Performed by: FAMILY MEDICINE

## 2022-06-16 RX ORDER — TRIAMCINOLONE ACETONIDE 1 MG/G
CREAM TOPICAL
COMMUNITY
Start: 2022-05-31 | End: 2023-06-12

## 2022-06-16 RX ORDER — MELOXICAM 15 MG/1
15 TABLET ORAL
COMMUNITY
Start: 2022-05-05 | End: 2022-06-16

## 2022-06-16 RX ORDER — DESONIDE 0.5 MG/G
CREAM TOPICAL
COMMUNITY
Start: 2022-05-31 | End: 2023-06-12

## 2022-06-16 ASSESSMENT — PATIENT HEALTH QUESTIONNAIRE - PHQ9: CLINICAL INTERPRETATION OF PHQ2 SCORE: 0

## 2022-06-16 ASSESSMENT — ACTIVITIES OF DAILY LIVING (ADL): BATHING_REQUIRES_ASSISTANCE: 0

## 2022-06-16 ASSESSMENT — ENCOUNTER SYMPTOMS: GENERAL WELL-BEING: EXCELLENT

## 2022-06-16 ASSESSMENT — FIBROSIS 4 INDEX: FIB4 SCORE: 1.62

## 2022-06-16 NOTE — PROGRESS NOTES
Chief Complaint   Patient presents with   • Annual Exam          HPI:  Roland is a 76 y.o. here for Medicare Annual Wellness Visit    Problem   Lung Nodule    Seen in urgent care down in [facility] for a rib fracture.  Had x-ray completed which showed a pulmonary nodule.  Would like to have further imaging completed.  50+ smoking year history, 7 to 8 cigarettes daily.     Initial Medicare Annual Wellness Visit   Essential Hypertension    Blood pressure elevated in clinic 150s/70s.  Recently taken off of metoprolol 50 mg daily, blood pressure stable cardiology clinic as well as recent urgent care visit in [facility].     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  Denies any depressive symptoms.     Dyslipidemia    Recent completed labs which showed improvement in his triglyceride levels, stable LDL.     Paf (Paroxysmal Atrial Fibrillation) (Hcc)    Being followed by Dr. Smooth Cruz with cardiology.  Recently taken off of metoprolol, blood pressure stable at that time.         Patient Active Problem List    Diagnosis Date Noted   • Lung nodule 06/16/2022   • Hypertriglyceridemia    • Prediabetes 02/24/2021   • Other insomnia 02/24/2021   • Atypical atrial flutter (HCC) seen 12/2019    • Chronic anticoagulation    • History of tobacco use 11/27/2019   • Initial Medicare annual wellness visit 11/27/2019   • Hemorrhoids 01/13/2019   • Tubular adenoma of colon 06/01/2018   • Essential hypertension 04/19/2018   • Hepatic steatosis 03/01/2018   • Diverticulosis of large intestine without hemorrhage 02/28/2018   • (HCC) Mild episode of recurrent major depressive disorder 02/28/2018   • Dyslipidemia    • Burgess's esophagus with dysplasia 10/15/2013   • PAF (paroxysmal atrial fibrillation) (HCC) 10/12/2013       Current Outpatient Medications   Medication Sig Dispense Refill   • triamcinolone acetonide (KENALOG) 0.1 % Cream APPLY TO FOREARMS, LEGS AS DIRECTED TWICE DAILY. DO NOT  APPLY TO THE FACE.     • desonide (TRIDESILON) 0.05 % Cream APPLY TO AFFECTED AREA TWICE DAILY. OK TO APPLY TO THE FACE     • diclofenac sodium (VOLTAREN) 1 % Gel Apply 2 g topically 4 times a day as needed. 100 g 0   • rivaroxaban (XARELTO) 20 MG Tab tablet Take 1 Tablet by mouth with dinner. 90 Tablet 3   • traZODone (DESYREL) 50 MG Tab TAKE 1 TABLET BY MOUTH AT BEDTIME AS NEEDED FOR SLEEP 90 Tablet 3   • simvastatin (ZOCOR) 40 MG Tab TAKE 1 TABLET BY MOUTH EVERY DAY 90 Tablet 3   • VASCEPA 1 g Cap TAKE 1 CAPSULE BY MOUTH TWICE A  Capsule 1   • divalproex (DEPAKOTE) 500 MG Tablet Delayed Response      • NON SPECIFIED C-NITROGLYCERIN 0.125% OINT SIG: 15, Refills: 2. Indiciation for use: Tenderness or swelling of minor hemorrhoids.  Directions for use: Apply to ravinder-rectal area twice a day, as needed. 15 Each 2   • PARoxetine (PAXIL) 10 MG Tab TAKE 1 TABLET BY MOUTH DAILY 90 Tablet 3     No current facility-administered medications for this visit.        Patient is taking medications as noted in medication list.  Current supplements as per medication list.     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 No, due for 2nd COVID Booster. Patient is interested in receiving NONE today.    He  reports that he quit smoking about 10 years ago. His smoking use included cigarettes. He has a 30.00 pack-year smoking history. He has never used smokeless tobacco. He reports previous alcohol use. He reports previous drug use.  Counseling given: Not Answered        DPA/Advanced directive: Patient has Advanced Directive, but it is not on file. Instructed to bring in a copy to scan into their chart.    ROS:    Gait: Uses no assistive device   Ostomy: No   Other tubes: No   Amputations: No   Chronic oxygen use No   Last eye exam 15 months ago  Wears hearing aids: No   : Denies any urinary leakage during the  last 6 months      Screening:      Depression Screening  Little interest or pleasure in doing things?  0 - not at all  Feeling down, depressed, or hopeless? 0 - not at all  Patient Health Questionnaire Score: 0    If depressive symptoms identified deferred to follow up visit unless specifically addressed in assessment and plan.    Interpretation of PHQ-9 Total Score   Score Severity   1-4 No Depression   5-9 Mild Depression   10-14 Moderate Depression   15-19 Moderately Severe Depression   20-27 Severe Depression    Screening for Cognitive Impairment  Three Minute Recall (daughter, heaven, mountain)  3/3    Damaso clock face with all 12 numbers and set the hands to show 10 past 11.  Yes    If cognitive concerns identified, deferred for follow up unless specifically addressed in assessment and plan.    Fall Risk Assessment  Has the patient had two or more falls in the last year or any fall with injury in the last year?  No  If fall risk identified, deferred for follow up unless specifically addressed in assessment and plan.    Safety Assessment  Throw rugs on floor.  No  Handrails on all stairs.  No, single story  Good lighting in all hallways.  Yes  Difficulty hearing.  No  Patient counseled about all safety risks that were identified.    Functional Assessment ADLs  Are there any barriers preventing you from cooking for yourself or meeting nutritional needs?  No.    Are there any barriers preventing you from driving safely or obtaining transportation?  No.    Are there any barriers preventing you from using a telephone or calling for help?  No.    Are there any barriers preventing you from shopping?  No.    Are there any barriers preventing you from taking care of your own finances?  No.    Are there any barriers preventing you from managing your medications?  No.    Are there any barriers preventing you from showering, bathing or dressing yourself?  No.    Are you currently engaging in any exercise or physical  activity?  Yes.     What is your perception of your health?  Excellent.    Health Maintenance Summary          Overdue - COVID-19 Vaccine (4 - Booster for Moderna series) Overdue since 3/8/2022    11/08/2021  Imm Admin: Moderna SARS-CoV-2 Vaccine    03/25/2021  Imm Admin: Moderna SARS-CoV-2 Vaccine    02/25/2021  Imm Admin: Moderna SARS-CoV-2 Vaccine          Annual Wellness Visit (Every 366 Days) Next due on 6/17/2023 06/16/2022  Initial Annual Wellness Visit - Includes PPPS ()    07/29/2019  Visit Dx: Medicare annual wellness visit, subsequent    10/31/2018  Visit Dx: Medicare annual wellness visit, subsequent    09/11/2018  Visit Dx: Medicare annual wellness visit, subsequent          COLORECTAL CANCER SCREENING (COLONOSCOPY - Every 5 Years) Next due on 5/26/2025 05/26/2020  Surgical Procedure: PB COLONOSCOPY,DIAGNOSTIC    09/10/2018  Surgical Procedure: COLONOSCOPY    05/25/2018  REFERRAL TO GI FOR COLONOSCOPY          IMM DTaP/Tdap/Td Vaccine (2 - Td or Tdap) Next due on 11/9/2028 11/09/2018  Imm Admin: Tdap Vaccine          HEPATITIS C SCREENING  Completed    03/01/2018  HEPATITIS PANEL ACUTE(4 COMPONENTS)          IMM PNEUMOCOCCAL VACCINE: 65+ Years (Series Information) Completed    11/27/2019  Imm Admin: Pneumococcal polysaccharide vaccine (PPSV-23)    02/28/2018  Imm Admin: Pneumococcal Conjugate Vaccine (Prevnar/PCV-13)    09/27/2013  Imm Admin: Pneumococcal Vaccine (UF) - HISTORICAL DATA          IMM ZOSTER VACCINES (Series Information) Completed    05/24/2021  Imm Admin: Zoster Vaccine Recombinant (RZV) (SHINGRIX)    09/29/2020  Imm Admin: Zoster Vaccine Recombinant (RZV) (SHINGRIX)          IMM INFLUENZA (Series Information) Completed    11/01/2021  Imm Admin: Influenza Vaccine Adult HD    10/22/2019  Imm Admin: Influenza Vaccine Adult HD    09/11/2018  Imm Admin: Influenza Vaccine Adult HD    11/07/2017  Imm Admin: Influenza Vaccine Pediatric Split - Historical Data    11/07/2017  Imm  Admin: Influenza Seasonal Injectable - Historical Data    Only the first 5 history entries have been loaded, but more history exists.          IMM HEP B VACCINE (Series Information) Aged Out    No completion history exists for this topic.          IMM MENINGOCOCCAL VACCINE (MCV4) (Series Information) Aged Out    No completion history exists for this topic.                Patient Care Team:  MICHAEL Freeman as PCP - General (Family Medicine)  Migel Cordero M.D. as Consulting Physician (Gastroenterology)    Social History     Tobacco Use   • Smoking status: Former Smoker     Packs/day: 0.60     Years: 50.00     Pack years: 30.00     Types: Cigarettes     Quit date: 10/1/2011     Years since quitting: 10.7   • Smokeless tobacco: Never Used   Vaping Use   • Vaping Use: Never used   Substance Use Topics   • Alcohol use: Not Currently     Comment: quit 2011 (hx heavy use 1 yr)   • Drug use: Not Currently     Comment: History of cocaine use quit 2011     Family History   Problem Relation Age of Onset   • No Known Problems Mother    • No Known Problems Father    • No Known Problems Brother    • No Known Problems Brother      He  has a past medical history of Arrhythmia (2011), Arthritis of left shoulder region (9/14/2018), Atypical atrial flutter (HCC) seen 12/2019, Chronic anticoagulation, H/O cocaine use (10/12/2013), H/O left inguinal hernia (3/26/2013), Heart burn, High cholesterol, Hypertension, Hypertriglyceridemia, Pneumonia (2018), Renal disorder (2011), and Right hydrocele (11/30/2018).   Past Surgical History:   Procedure Laterality Date   • ND COLONOSCOPY,DIAGNOSTIC  5/26/2020    Procedure: COLONOSCOPY - WITH POSSIBLE BIOPSY, DILATION, POLYPECTOMY, CONTROL OF HEMORRHAGE;  Surgeon: Migel Cordero M.D.;  Location: SURGERY [facility];  Service: Gastroenterology   • COLONOSCOPY N/A 9/10/2018    Procedure: COLONOSCOPY;  Surgeon: Migel Cordero M.D.;  Location: SURGERY SAME DAY [facility];  Service:  "Gastroenterology   • GASTROSCOPY-ENDO  10/13/2013    Performed by Oseas Madden M.D. at ENDOSCOPY [facility] ORS   • INGUINAL HERNIA REPAIR  3/26/2013    Performed by Felipe Carter M.D. at SURGERY SAME DAY [facility] ORS   • OTHER  2011    dialysis cath insertion and removal   • COLONOSCOPY             Exam:     BP (!) 150/70 (BP Location: Right arm, Patient Position: Sitting, BP Cuff Size: Adult)   Pulse 87   Temp 36.7 °C (98 °F)   Resp 17   Ht 1.778 m (5' 10\")   Wt 78 kg (171 lb 15.3 oz)   SpO2 94%  Body mass index is 24.67 kg/m².    Hearing excellent.    Dentition fair, cleaning 3 times a year  Alert, oriented in no acute distress.  Eye contact is good, speech goal directed, affect calm    Physical Exam  Constitutional:       General: He is not in acute distress.  HENT:      Head: Normocephalic and atraumatic.      Right Ear: External ear normal.      Left Ear: External ear normal.   Eyes:      General: Lids are normal.      Extraocular Movements: Extraocular movements intact.      Conjunctiva/sclera: Conjunctivae normal.      Pupils: Pupils are equal, round, and reactive to light.   Neck:      Trachea: Trachea normal.   Cardiovascular:      Rate and Rhythm: Normal rate and regular rhythm.      Heart sounds: Normal heart sounds. No murmur heard.    No friction rub. No gallop.   Pulmonary:      Effort: Pulmonary effort is normal. No accessory muscle usage.      Breath sounds: Normal breath sounds. No wheezing or rales.   Abdominal:      General: Bowel sounds are normal.      Palpations: Abdomen is soft.      Tenderness: There is no abdominal tenderness.   Musculoskeletal:      Cervical back: Normal range of motion and neck supple.      Right lower leg: No edema.      Left lower leg: No edema.   Lymphadenopathy:      Cervical: No cervical adenopathy.   Skin:     General: Skin is warm and dry.      Findings: No rash.   Neurological:      General: No focal deficit present.      Mental Status: He is " alert and oriented to person, place, and time.      GCS: GCS eye subscore is 4. GCS verbal subscore is 5. GCS motor subscore is 6.      Gait: Gait is intact.      Deep Tendon Reflexes:      Reflex Scores:       Brachioradialis reflexes are 2+ on the right side and 2+ on the left side.       Patellar reflexes are 2+ on the right side and 2+ on the left side.  Psychiatric:         Attention and Perception: Attention normal.         Mood and Affect: Affect normal.         Speech: Speech normal.           Assessment and Plan. The following treatment and monitoring plan is recommended:    1. Initial Medicare annual wellness visit  Initial Annual Wellness Visit - Includes PPPS ()   2. Mild episode of recurrent major depressive disorder (HCC)     3. Lung nodule  CT-CHEST (THORAX) W/O   4. Dyslipidemia  Lipid Profile   5. Prediabetes  Comp Metabolic Panel    ESTIMATED GFR    HEMOGLOBIN A1C   6. PAF (paroxysmal atrial fibrillation) ([facility])     7. Essential hypertension     8. Screening for deficiency anemia  CBC WITH DIFFERENTIAL       Problem   Lung Nodule    Seen in urgent care down in [place] [facility] for a rib fracture.  Had x-ray completed which showed a pulmonary nodule.  Would like to have further imaging completed.  50+ smoking year history, 7 to 8 cigarettes daily.     Initial Medicare Annual Wellness Visit   Essential Hypertension    Blood pressure elevated in clinic 150s/70s.  Recently taken off of metoprolol 50 mg daily, blood pressure stable cardiology clinic as well as recent urgent care visit in [place].     ([facility]) Mild episode of recurrent major depressive disorder    Stable on Paxil 10 mg daily.  Denies any depressive symptoms.     Dyslipidemia    Recent completed labs which showed improvement in his triglyceride levels, stable LDL.     Paf (Paroxysmal Atrial Fibrillation) ([facility])    Being followed by Dr. Smooth Cruz with cardiology.  Recently taken off of metoprolol, blood pressure stable at that time.              Follow-up: Return in about 1 year (around 6/16/2023) for Annual wellness visit.

## 2022-06-16 NOTE — ASSESSMENT & PLAN NOTE
Services suggested: No services needed at this time  Health Care Screening recommendations as per orders if indicated.  Referrals offered: PT/OT/Nutrition counseling/Behavioral Health/Smoking cessation as per orders if indicated.    Discussion today about general wellness and lifestyle habits:    · Prevent falls and reduce trip hazards; Cautioned about securing or removing rugs.  · Have a working fire alarm and carbon monoxide detector;   · Engage in regular physical activity and social activities

## 2022-06-21 NOTE — PROGRESS NOTES
Chief Complaint   Patient presents with   • Annual Exam          HPI:  Roland is a 76 y.o. here for Medicare Annual Wellness Visit    Problem   Lung Nodule    Seen in urgent care down in [facility] for a rib fracture.  Had x-ray completed which showed a pulmonary nodule.  Would like to have further imaging completed.  50+ smoking year history, 7 to 8 cigarettes daily.     Initial Medicare Annual Wellness Visit   Essential Hypertension    Blood pressure elevated in clinic 150s/70s.  Recently taken off of metoprolol 50 mg daily, blood pressure stable cardiology clinic as well as recent urgent care visit in [facility].     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  Denies any depressive symptoms.     Dyslipidemia    Recent completed labs which showed improvement in his triglyceride levels, stable LDL.     Paf (Paroxysmal Atrial Fibrillation) (Hcc)    Being followed by Dr. Smooth Cruz with cardiology.  Recently taken off of metoprolol, blood pressure stable at that time.         Patient Active Problem List    Diagnosis Date Noted   • Lung nodule 06/16/2022   • Hypertriglyceridemia    • Prediabetes 02/24/2021   • Other insomnia 02/24/2021   • Atypical atrial flutter (HCC) seen 12/2019    • Chronic anticoagulation    • History of tobacco use 11/27/2019   • Initial Medicare annual wellness visit 11/27/2019   • Hemorrhoids 01/13/2019   • Tubular adenoma of colon 06/01/2018   • Essential hypertension 04/19/2018   • Hepatic steatosis 03/01/2018   • Diverticulosis of large intestine without hemorrhage 02/28/2018   • (HCC) Mild episode of recurrent major depressive disorder 02/28/2018   • Dyslipidemia    • Burgess's esophagus with dysplasia 10/15/2013   • PAF (paroxysmal atrial fibrillation) (HCC) 10/12/2013       Current Outpatient Medications   Medication Sig Dispense Refill   • triamcinolone acetonide (KENALOG) 0.1 % Cream APPLY TO FOREARMS, LEGS AS DIRECTED TWICE DAILY. DO NOT  APPLY TO THE FACE.     • desonide (TRIDESILON) 0.05 % Cream APPLY TO AFFECTED AREA TWICE DAILY. OK TO APPLY TO THE FACE     • diclofenac sodium (VOLTAREN) 1 % Gel Apply 2 g topically 4 times a day as needed. 100 g 0   • rivaroxaban (XARELTO) 20 MG Tab tablet Take 1 Tablet by mouth with dinner. 90 Tablet 3   • traZODone (DESYREL) 50 MG Tab TAKE 1 TABLET BY MOUTH AT BEDTIME AS NEEDED FOR SLEEP 90 Tablet 3   • simvastatin (ZOCOR) 40 MG Tab TAKE 1 TABLET BY MOUTH EVERY DAY 90 Tablet 3   • VASCEPA 1 g Cap TAKE 1 CAPSULE BY MOUTH TWICE A  Capsule 1   • divalproex (DEPAKOTE) 500 MG Tablet Delayed Response      • NON SPECIFIED C-NITROGLYCERIN 0.125% OINT SIG: 15, Refills: 2. Indiciation for use: Tenderness or swelling of minor hemorrhoids.  Directions for use: Apply to ravinder-rectal area twice a day, as needed. 15 Each 2   • PARoxetine (PAXIL) 10 MG Tab TAKE 1 TABLET BY MOUTH DAILY 90 Tablet 3     No current facility-administered medications for this visit.        Patient is taking medications as noted in medication list.  Current supplements as per medication list.     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 No, due for 2nd COVID Booster. Patient is interested in receiving NONE today.    He  reports that he quit smoking about 10 years ago. His smoking use included cigarettes. He has a 30.00 pack-year smoking history. He has never used smokeless tobacco. He reports previous alcohol use. He reports previous drug use.  Counseling given: Not Answered        DPA/Advanced directive: Patient has Advanced Directive, but it is not on file. Instructed to bring in a copy to scan into their chart.    ROS:    Gait: Uses no assistive device   Ostomy: No   Other tubes: No   Amputations: No   Chronic oxygen use No   Last eye exam 15 months ago  Wears hearing aids: No   : Denies any urinary leakage during the  last 6 months      Screening:      Depression Screening  Little interest or pleasure in doing things?  0 - not at all  Feeling down, depressed, or hopeless? 0 - not at all  Patient Health Questionnaire Score: 0    If depressive symptoms identified deferred to follow up visit unless specifically addressed in assessment and plan.    Interpretation of PHQ-9 Total Score   Score Severity   1-4 No Depression   5-9 Mild Depression   10-14 Moderate Depression   15-19 Moderately Severe Depression   20-27 Severe Depression    Screening for Cognitive Impairment  Three Minute Recall (daughter, heaven, mountain)  3/3    Damaso clock face with all 12 numbers and set the hands to show 10 past 11.  Yes    If cognitive concerns identified, deferred for follow up unless specifically addressed in assessment and plan.    Fall Risk Assessment  Has the patient had two or more falls in the last year or any fall with injury in the last year?  No  If fall risk identified, deferred for follow up unless specifically addressed in assessment and plan.    Safety Assessment  Throw rugs on floor.  No  Handrails on all stairs.  No, single story  Good lighting in all hallways.  Yes  Difficulty hearing.  No  Patient counseled about all safety risks that were identified.    Functional Assessment ADLs  Are there any barriers preventing you from cooking for yourself or meeting nutritional needs?  No.    Are there any barriers preventing you from driving safely or obtaining transportation?  No.    Are there any barriers preventing you from using a telephone or calling for help?  No.    Are there any barriers preventing you from shopping?  No.    Are there any barriers preventing you from taking care of your own finances?  No.    Are there any barriers preventing you from managing your medications?  No.    Are there any barriers preventing you from showering, bathing or dressing yourself?  No.    Are you currently engaging in any exercise or physical  activity?  Yes.     What is your perception of your health?  Excellent.    Health Maintenance Summary          Overdue - COVID-19 Vaccine (4 - Booster for Moderna series) Overdue since 3/8/2022    11/08/2021  Imm Admin: Moderna SARS-CoV-2 Vaccine    03/25/2021  Imm Admin: Moderna SARS-CoV-2 Vaccine    02/25/2021  Imm Admin: Moderna SARS-CoV-2 Vaccine          Annual Wellness Visit (Every 366 Days) Next due on 6/17/2023 06/16/2022  Initial Annual Wellness Visit - Includes PPPS ()    07/29/2019  Visit Dx: Medicare annual wellness visit, subsequent    10/31/2018  Visit Dx: Medicare annual wellness visit, subsequent    09/11/2018  Visit Dx: Medicare annual wellness visit, subsequent          COLORECTAL CANCER SCREENING (COLONOSCOPY - Every 5 Years) Next due on 5/26/2025 05/26/2020  Surgical Procedure: PB COLONOSCOPY,DIAGNOSTIC    09/10/2018  Surgical Procedure: COLONOSCOPY    05/25/2018  REFERRAL TO GI FOR COLONOSCOPY          IMM DTaP/Tdap/Td Vaccine (2 - Td or Tdap) Next due on 11/9/2028 11/09/2018  Imm Admin: Tdap Vaccine          HEPATITIS C SCREENING  Completed    03/01/2018  HEPATITIS PANEL ACUTE(4 COMPONENTS)          IMM PNEUMOCOCCAL VACCINE: 65+ Years (Series Information) Completed    11/27/2019  Imm Admin: Pneumococcal polysaccharide vaccine (PPSV-23)    02/28/2018  Imm Admin: Pneumococcal Conjugate Vaccine (Prevnar/PCV-13)    09/27/2013  Imm Admin: Pneumococcal Vaccine (UF) - HISTORICAL DATA          IMM ZOSTER VACCINES (Series Information) Completed    05/24/2021  Imm Admin: Zoster Vaccine Recombinant (RZV) (SHINGRIX)    09/29/2020  Imm Admin: Zoster Vaccine Recombinant (RZV) (SHINGRIX)          IMM INFLUENZA (Series Information) Completed    11/01/2021  Imm Admin: Influenza Vaccine Adult HD    10/22/2019  Imm Admin: Influenza Vaccine Adult HD    09/11/2018  Imm Admin: Influenza Vaccine Adult HD    11/07/2017  Imm Admin: Influenza Vaccine Pediatric Split - Historical Data    11/07/2017  Imm  Admin: Influenza Seasonal Injectable - Historical Data    Only the first 5 history entries have been loaded, but more history exists.          IMM HEP B VACCINE (Series Information) Aged Out    No completion history exists for this topic.          IMM MENINGOCOCCAL VACCINE (MCV4) (Series Information) Aged Out    No completion history exists for this topic.                Patient Care Team:  MICHAEL Freeman as PCP - General (Family Medicine)  Migel Cordero M.D. as Consulting Physician (Gastroenterology)    Social History     Tobacco Use   • Smoking status: Former Smoker     Packs/day: 0.60     Years: 50.00     Pack years: 30.00     Types: Cigarettes     Quit date: 10/1/2011     Years since quitting: 10.7   • Smokeless tobacco: Never Used   Vaping Use   • Vaping Use: Never used   Substance Use Topics   • Alcohol use: Not Currently     Comment: quit 2011 (hx heavy use 1 yr)   • Drug use: Not Currently     Comment: History of cocaine use quit 2011     Family History   Problem Relation Age of Onset   • No Known Problems Mother    • No Known Problems Father    • No Known Problems Brother    • No Known Problems Brother      He  has a past medical history of Arrhythmia (2011), Arthritis of left shoulder region (9/14/2018), Atypical atrial flutter (HCC) seen 12/2019, Chronic anticoagulation, H/O cocaine use (10/12/2013), H/O left inguinal hernia (3/26/2013), Heart burn, High cholesterol, Hypertension, Hypertriglyceridemia, Pneumonia (2018), Renal disorder (2011), and Right hydrocele (11/30/2018).   Past Surgical History:   Procedure Laterality Date   • GA COLONOSCOPY,DIAGNOSTIC  5/26/2020    Procedure: COLONOSCOPY - WITH POSSIBLE BIOPSY, DILATION, POLYPECTOMY, CONTROL OF HEMORRHAGE;  Surgeon: Migel Cordero M.D.;  Location: SURGERY [facility];  Service: Gastroenterology   • COLONOSCOPY N/A 9/10/2018    Procedure: COLONOSCOPY;  Surgeon: Migel Cordero M.D.;  Location: SURGERY SAME DAY [facility];  Service:  "Gastroenterology   • GASTROSCOPY-ENDO  10/13/2013    Performed by Oseas Madden M.D. at ENDOSCOPY [facility]S   • INGUINAL HERNIA REPAIR  3/26/2013    Performed by Felipe Carter M.D. at SURGERY SAME DAY [facility] ORS   • OTHER  2011    dialysis cath insertion and removal   • COLONOSCOPY             Exam:     BP (!) 150/70 (BP Location: Right arm, Patient Position: Sitting, BP Cuff Size: Adult)   Pulse 87   Temp 36.7 °C (98 °F)   Resp 17   Ht 1.778 m (5' 10\")   Wt 78 kg (171 lb 15.3 oz)   SpO2 94%  Body mass index is 24.67 kg/m².    Hearing excellent.    Dentition fair, cleaning 3 times a year  Alert, oriented in no acute distress.  Eye contact is good, speech goal directed, affect calm    Physical Exam  Constitutional:       General: He is not in acute distress.  HENT:      Head: Normocephalic and atraumatic.      Right Ear: External ear normal.      Left Ear: External ear normal.   Eyes:      General: Lids are normal.      Extraocular Movements: Extraocular movements intact.      Conjunctiva/sclera: Conjunctivae normal.      Pupils: Pupils are equal, round, and reactive to light.   Neck:      Trachea: Trachea normal.   Cardiovascular:      Rate and Rhythm: Normal rate and regular rhythm.      Heart sounds: Normal heart sounds. No murmur heard.    No friction rub. No gallop.   Pulmonary:      Effort: Pulmonary effort is normal. No accessory muscle usage.      Breath sounds: Normal breath sounds. No wheezing or rales.   Abdominal:      General: Bowel sounds are normal.      Palpations: Abdomen is soft.      Tenderness: There is no abdominal tenderness.   Musculoskeletal:      Cervical back: Normal range of motion and neck supple.      Right lower leg: No edema.      Left lower leg: No edema.   Lymphadenopathy:      Cervical: No cervical adenopathy.   Skin:     General: Skin is warm and dry.      Findings: No rash.   Neurological:      General: No focal deficit present.      Mental Status: He is " alert and oriented to person, place, and time.      GCS: GCS eye subscore is 4. GCS verbal subscore is 5. GCS motor subscore is 6.      Gait: Gait is intact.      Deep Tendon Reflexes:      Reflex Scores:       Brachioradialis reflexes are 2+ on the right side and 2+ on the left side.       Patellar reflexes are 2+ on the right side and 2+ on the left side.  Psychiatric:         Attention and Perception: Attention normal.         Mood and Affect: Affect normal.         Speech: Speech normal.           Assessment and Plan. The following treatment and monitoring plan is recommended:    1. Initial Medicare annual wellness visit  Initial Annual Wellness Visit - Includes PPPS ()   2. Mild episode of recurrent major depressive disorder (HCC)     3. Lung nodule  CT-CHEST (THORAX) W/O   4. Dyslipidemia  Lipid Profile   5. Prediabetes  Comp Metabolic Panel    ESTIMATED GFR    HEMOGLOBIN A1C   6. PAF (paroxysmal atrial fibrillation) ([facility])     7. Essential hypertension     8. Screening for deficiency anemia  CBC WITH DIFFERENTIAL       Problem   Lung Nodule    Seen in urgent care down in [facility] for a rib fracture.  Had x-ray completed which showed a pulmonary nodule.  Would like to have further imaging completed.  50+ smoking year history, 7 to 8 cigarettes daily.     Initial Medicare Annual Wellness Visit   Essential Hypertension    Blood pressure elevated in clinic 150s/70s.  Recently taken off of metoprolol 50 mg daily, blood pressure stable cardiology clinic as well as recent urgent care visit in [facility].     ([facility]) Mild episode of recurrent major depressive disorder    Stable on Paxil 10 mg daily.  Denies any depressive symptoms.     Dyslipidemia    Recent completed labs which showed improvement in his triglyceride levels, stable LDL.     Paf (Paroxysmal Atrial Fibrillation) ([facility])    Being followed by Dr. Smooth Cruz with cardiology.  Recently taken off of metoprolol, blood pressure stable at that time.              Follow-up: Return in about 1 year (around 6/16/2023) for Annual wellness visit.

## 2022-06-27 ENCOUNTER — HOSPITAL ENCOUNTER (OUTPATIENT)
Dept: RADIOLOGY | Facility: MEDICAL CENTER | Age: 77
End: 2022-06-27
Attending: FAMILY MEDICINE
Payer: MEDICARE

## 2022-06-27 DIAGNOSIS — R91.1 LUNG NODULE: ICD-10-CM

## 2022-06-27 DIAGNOSIS — Z87.891 HISTORY OF TOBACCO USE: ICD-10-CM

## 2022-06-27 PROCEDURE — 71250 CT THORAX DX C-: CPT | Mod: ME

## 2022-06-27 NOTE — PROGRESS NOTES
Referral to lung cancer screening program placed for the patient, present pulmonary nodule and long history of smoking

## 2022-06-28 ENCOUNTER — TELEPHONE (OUTPATIENT)
Dept: HEMATOLOGY ONCOLOGY | Facility: MEDICAL CENTER | Age: 77
End: 2022-06-28
Payer: MEDICARE

## 2022-06-28 DIAGNOSIS — Z87.891 HISTORY OF TOBACCO USE: ICD-10-CM

## 2022-06-28 DIAGNOSIS — R91.1 LUNG NODULE: ICD-10-CM

## 2022-06-28 NOTE — TELEPHONE ENCOUNTER
SDM appt completed 1/7/2020-    Our Lung Cancer Screening program APRN each year. Please place an order for the LDCT by selecting the LUNG CANCER SCREENING CT, when asked if the patient has completed a Shared Decision Making Appointment select yes. You will need to verify eligibility each year to ensure your patient meets criteria.    Eligibility for lung cancer screening program is based on best practice guidelines approved through the [facility]:  Age 55-77 yrs of age   30 pack year hx of smoking, or greater   Current smoker or if quit, must have quit within last 15 yrs   Asymptomatic (no signs or symptoms of lung cancer)    No previous history of lung cancer   No Chest CT within past 12 mos.

## 2022-07-21 DIAGNOSIS — E78.5 HYPERLIPIDEMIA, UNSPECIFIED HYPERLIPIDEMIA TYPE: ICD-10-CM

## 2022-07-21 RX ORDER — SIMVASTATIN 40 MG
TABLET ORAL
Qty: 45 TABLET | Refills: 1 | Status: SHIPPED | OUTPATIENT
Start: 2022-07-21 | End: 2023-04-16 | Stop reason: SDUPTHER

## 2022-07-22 ENCOUNTER — OFFICE VISIT (OUTPATIENT)
Dept: MEDICAL GROUP | Facility: MEDICAL CENTER | Age: 77
End: 2022-07-22
Payer: MEDICARE

## 2022-07-22 VITALS
HEIGHT: 70 IN | OXYGEN SATURATION: 99 % | DIASTOLIC BLOOD PRESSURE: 78 MMHG | BODY MASS INDEX: 24.25 KG/M2 | TEMPERATURE: 97.5 F | SYSTOLIC BLOOD PRESSURE: 138 MMHG | HEART RATE: 69 BPM | WEIGHT: 169.4 LBS

## 2022-07-22 DIAGNOSIS — R91.1 LUNG NODULE: ICD-10-CM

## 2022-07-22 DIAGNOSIS — K64.9 HEMORRHOIDS, UNSPECIFIED HEMORRHOID TYPE: ICD-10-CM

## 2022-07-22 PROCEDURE — 99214 OFFICE O/P EST MOD 30 MIN: CPT | Performed by: FAMILY MEDICINE

## 2022-07-22 RX ORDER — IBUPROFEN 800 MG/1
800 TABLET ORAL EVERY 8 HOURS PRN
Qty: 30 TABLET | Refills: 0 | Status: SHIPPED | OUTPATIENT
Start: 2022-07-22

## 2022-07-22 RX ORDER — HYDROCORTISONE ACETATE 25 MG/1
25 SUPPOSITORY RECTAL EVERY 12 HOURS
Qty: 12 SUPPOSITORY | Refills: 3 | Status: SHIPPED | OUTPATIENT
Start: 2022-07-22 | End: 2022-08-15

## 2022-07-22 RX ORDER — HYDROCORTISONE ACETATE 25 MG/1
25 SUPPOSITORY RECTAL EVERY 12 HOURS
Qty: 12 SUPPOSITORY | Refills: 5 | Status: CANCELLED | OUTPATIENT
Start: 2022-07-22 | End: 2022-08-27

## 2022-07-22 ASSESSMENT — ENCOUNTER SYMPTOMS
FEVER: 0
COUGH: 0
SPUTUM PRODUCTION: 0
DEPRESSION: 0
HEMOPTYSIS: 0
SHORTNESS OF BREATH: 0
WEIGHT LOSS: 0
PALPITATIONS: 0
CHILLS: 0
ABDOMINAL PAIN: 0
MYALGIAS: 0
DIZZINESS: 0
WEAKNESS: 0

## 2022-07-22 ASSESSMENT — FIBROSIS 4 INDEX: FIB4 SCORE: 1.62

## 2022-07-22 NOTE — ASSESSMENT & PLAN NOTE
Chronic, unstable.  Refill of his nitroglycerin cream sent to the compounding pharmacy.  Initiate hydrocortisone suppositories.  Sent in a prescription for ibuprofen 800 mg every 8 hours.  Did recommend using 1 g Tylenol along with ibuprofen to help with severe pain relief.  Educated patient on safety of Tylenol and ibuprofen usage.  Continue to use stool softeners such as Metamucil, making sure that he is getting any of fiber in his diet.

## 2022-07-22 NOTE — ASSESSMENT & PLAN NOTE
New diagnosis for the patient.  Discussed the lung cancer CT program, recommending low-dose CT once a year to monitor his lung nodule.

## 2022-07-22 NOTE — PROGRESS NOTES
Papo Souza is a pleasant 76 y.o. male here for   Chief Complaint   Patient presents with   • Hemorrhoids     X 1 wk,       HPI:   Problem   Lung Nodule    Seen in urgent care down in [facility] for a rib fracture.  Had x-ray completed which showed a pulmonary nodule.  06/27/2022 chest CT showed: 2.4 mm nodule left lower lobe and punctate nodules right major fissure  Radiology recommends follow-up in 1 year, high risk due to smoking history.  30-pack-year history, quit in 2011  Patient has been enrolled in the lung cancer CT program.     Hemorrhoids    Has been dealing a worsening of his hemorrhoids lately.  Using his nitroglycerin cream without much pain relief.  Will use ibuprofen as needed.  Has an appointment with his gastroenterologist in September, currently on the cancellation list.  Feels that his hemorrhoids are bad enough that he may need to consider surgery.  Trying to eat lots of fresh fruits and vegetables, taking a stool softener to help with this discomfort but nothing is providing him with relief            Current Medicines (including changes today)  Current Outpatient Medications   Medication Sig Dispense Refill   • NON SPECIFIED C-NITROGLYCERIN 0.125% OINT SIG: 15, Refills: 2. Indiciation for use: Tenderness or swelling of minor hemorrhoids.  Directions for use: Apply to ravinder-rectal area twice a day, as needed. 15 Each 5   • hydrocortisone (ANUSOL-HC) 25 MG Suppos Insert 1 Suppository into the rectum every 12 hours for 24 days. 12 Suppository 3   • ibuprofen (MOTRIN) 800 MG Tab Take 1 Tablet by mouth every 8 hours as needed for Moderate Pain or Inflammation. 30 Tablet 0   • simvastatin (ZOCOR) 40 MG Tab TAKE 1/2 TABLET BY MOUTH EVERY DAY 45 Tablet 1   • triamcinolone acetonide (KENALOG) 0.1 % Cream APPLY TO FOREARMS, LEGS AS DIRECTED TWICE DAILY. DO NOT APPLY TO THE FACE.     • desonide (TRIDESILON) 0.05 % Cream APPLY TO AFFECTED AREA TWICE DAILY. OK TO APPLY TO THE FACE     • diclofenac  "sodium (VOLTAREN) 1 % Gel Apply 2 g topically 4 times a day as needed. 100 g 0   • rivaroxaban (XARELTO) 20 MG Tab tablet Take 1 Tablet by mouth with dinner. 90 Tablet 3   • traZODone (DESYREL) 50 MG Tab TAKE 1 TABLET BY MOUTH AT BEDTIME AS NEEDED FOR SLEEP 90 Tablet 3   • VASCEPA 1 g Cap TAKE 1 CAPSULE BY MOUTH TWICE A  Capsule 1   • divalproex (DEPAKOTE) 500 MG Tablet Delayed Response      • PARoxetine (PAXIL) 10 MG Tab TAKE 1 TABLET BY MOUTH DAILY 90 Tablet 3     No current facility-administered medications for this visit.     Past Medical/ Surgical History  He  has a past medical history of Arrhythmia (2011), Arthritis of left shoulder region (9/14/2018), Atypical atrial flutter (HCC) seen 12/2019, Chronic anticoagulation, H/O cocaine use (10/12/2013), H/O left inguinal hernia (3/26/2013), Heart burn, High cholesterol, Hypertension, Hypertriglyceridemia, Pneumonia (2018), Renal disorder (2011), and Right hydrocele (11/30/2018).  He  has a past surgical history that includes other (2011); gastroscopy-endo (10/13/2013); colonoscopy; colonoscopy (N/A, 9/10/2018); inguinal hernia repair (3/26/2013); and pr colonoscopy,diagnostic (5/26/2020).    Review of Systems   Constitutional: Negative for chills, fever, malaise/fatigue and weight loss.   Respiratory: Negative for cough, hemoptysis, sputum production and shortness of breath.    Cardiovascular: Negative for chest pain and palpitations.   Gastrointestinal: Negative for abdominal pain.        Hemorrhoid discomfort   Genitourinary: Negative.    Musculoskeletal: Negative for myalgias.   Skin: Negative for rash.   Neurological: Negative for dizziness and weakness.   Psychiatric/Behavioral: Negative for depression.         Objective:     /78 (BP Location: Left arm, Patient Position: Sitting, BP Cuff Size: Adult)   Pulse 69   Temp 36.4 °C (97.5 °F) (Temporal)   Ht 1.778 m (5' 10\")   Wt 76.8 kg (169 lb 6.4 oz)   SpO2 99%  Body mass index is 24.31 " kg/m².    Physical Exam  Constitutional:       General: He is not in acute distress.  HENT:      Head: Normocephalic and atraumatic.   Eyes:      Conjunctiva/sclera: Conjunctivae normal.      Pupils: Pupils are equal, round, and reactive to light.   Pulmonary:      Effort: Pulmonary effort is normal. No respiratory distress.   Abdominal:      General: There is no distension.   Musculoskeletal:      Cervical back: Normal range of motion and neck supple.   Skin:     General: Skin is warm and dry.      Findings: No rash.   Neurological:      Mental Status: He is alert and oriented to person, place, and time.      Gait: Gait is intact.   Psychiatric:         Mood and Affect: Affect normal.        Imagin2022 CT lun.4 mm nodule left lower lobe and punctate nodules right major fissure    Labs  No pertinent labs to review  Assessment and Plan:   The following treatment plan was discussed    Problem List Items Addressed This Visit     Hemorrhoids     Chronic, unstable.  Refill of his nitroglycerin cream sent to the compounding pharmacy.  Initiate hydrocortisone suppositories.  Sent in a prescription for ibuprofen 800 mg every 8 hours.  Did recommend using 1 g Tylenol along with ibuprofen to help with severe pain relief.  Educated patient on safety of Tylenol and ibuprofen usage.  Continue to use stool softeners such as Metamucil, making sure that he is getting any of fiber in his diet.           Relevant Medications    NON SPECIFIED    hydrocortisone (ANUSOL-HC) 25 MG Suppos    ibuprofen (MOTRIN) 800 MG Tab    Lung nodule     New diagnosis for the patient.  Discussed the lung cancer CT program, recommending low-dose CT once a year to monitor his lung nodule.                  Followup: Return if symptoms worsen or fail to improve.      Please note that this dictation was created using voice recognition software. I have made every reasonable attempt to correct obvious errors, but I expect that there are errors of  grammar and possibly content that I did not discover before finalizing the note.

## 2022-09-17 DIAGNOSIS — F33.0 MILD EPISODE OF RECURRENT MAJOR DEPRESSIVE DISORDER (HCC): ICD-10-CM

## 2022-09-19 ENCOUNTER — APPOINTMENT (OUTPATIENT)
Dept: MEDICAL GROUP | Facility: MEDICAL CENTER | Age: 77
End: 2022-09-19
Payer: MEDICARE

## 2022-09-19 ENCOUNTER — OFFICE VISIT (OUTPATIENT)
Dept: MEDICAL GROUP | Facility: MEDICAL CENTER | Age: 77
End: 2022-09-19
Payer: MEDICARE

## 2022-09-19 VITALS
SYSTOLIC BLOOD PRESSURE: 148 MMHG | OXYGEN SATURATION: 98 % | WEIGHT: 165.6 LBS | DIASTOLIC BLOOD PRESSURE: 80 MMHG | BODY MASS INDEX: 24.53 KG/M2 | HEART RATE: 89 BPM | TEMPERATURE: 98 F | HEIGHT: 69 IN

## 2022-09-19 DIAGNOSIS — R63.4 WEIGHT LOSS, UNINTENTIONAL: ICD-10-CM

## 2022-09-19 DIAGNOSIS — L30.9 DERMATITIS: ICD-10-CM

## 2022-09-19 DIAGNOSIS — Z23 IMMUNIZATION DUE: ICD-10-CM

## 2022-09-19 PROCEDURE — G0008 ADMIN INFLUENZA VIRUS VAC: HCPCS | Performed by: FAMILY MEDICINE

## 2022-09-19 PROCEDURE — 99214 OFFICE O/P EST MOD 30 MIN: CPT | Mod: 25 | Performed by: FAMILY MEDICINE

## 2022-09-19 PROCEDURE — 90662 IIV NO PRSV INCREASED AG IM: CPT | Performed by: FAMILY MEDICINE

## 2022-09-19 RX ORDER — PREDNISONE 20 MG/1
TABLET ORAL
Qty: 30 TABLET | Refills: 0 | Status: SHIPPED | OUTPATIENT
Start: 2022-09-19 | End: 2022-09-19

## 2022-09-19 RX ORDER — PAROXETINE 10 MG/1
TABLET, FILM COATED ORAL
Qty: 90 TABLET | Refills: 1 | Status: SHIPPED | OUTPATIENT
Start: 2022-09-19 | End: 2022-11-10 | Stop reason: SDUPTHER

## 2022-09-19 RX ORDER — PREDNISONE 20 MG/1
TABLET ORAL
Qty: 8 TABLET | Refills: 0 | Status: SHIPPED | OUTPATIENT
Start: 2022-09-19 | End: 2022-09-24

## 2022-09-19 ASSESSMENT — FIBROSIS 4 INDEX: FIB4 SCORE: 1.642857142857142857

## 2022-09-19 NOTE — ASSESSMENT & PLAN NOTE
Acute.  Suspect contact dermatitis to no blisters are noted.  Due to the cracking and the constant itching, concerned for or bacterial infection.  Oral prednisone 40 mg x 3 days followed by to 20 mg for 2 days sent to his preferred pharmacy.  Prescription for Bactroban ordered to be applied to prevent possible infection.  Recommended thick emollient to help with moisturizing.

## 2022-09-19 NOTE — ASSESSMENT & PLAN NOTE
New to the patient.  Suspect increase in activity as a source of his weight loss.  Recommended to continue activity.  Will obtain labs to rule out other possible weight loss such as thyroid dysfunction, electrolyte imbalance.

## 2022-09-19 NOTE — PROGRESS NOTES
Papo Souza is a pleasant 77 y.o. male here for   Chief Complaint   Patient presents with    Weight Loss    Hand Problem      HPI:   Problem   Dermatitis    1 week has noticed increase in dryness, skin cracking to bilateral hands and in between fingers.  Reached in to [employer] out a some stagnant water, concerned that he may have gotten some dermatitis associated with that.  He has had it before in the past which was treated with oral steroids and improved.  The dermatitis has spread up to his wrist and upper arms  Increase in puritis, neck pain  Has applied topical lotions, no improvement in the dryness.     Weight Loss, Unintentional    Intentional weight loss of 6 pounds over the course of a few months.  Has not changed anything that he does in terms of diet.  Has noted some increase in activity, 20,000 steps a day.  Feels pretty good, denies any night sweats, bloating.  Concerned as he does have a lung nodule, wants to make sure that he does not have lung cancer.  Denies any shortness of breath, blood in his sputum, or new cough, no night sweats or insomnia            Current Medicines (including changes today)  Current Outpatient Medications   Medication Sig Dispense Refill    mupirocin (BACTROBAN) 2 % Ointment Apply 1 Application topically 2 times a day. 22 g 0    predniSONE (DELTASONE) 20 MG Tab Take 2 Tablets by mouth every day for 3 days, THEN 1 Tablet every day for 2 days. 8 Tablet 0    PARoxetine (PAXIL) 10 MG Tab TAKE 1 TABLET BY MOUTH EVERY DAY 90 Tablet 1    NON SPECIFIED C-NITROGLYCERIN 0.125% OINT SIG: 15, Refills: 2. Indiciation for use: Tenderness or swelling of minor hemorrhoids.  Directions for use: Apply to ravinder-rectal area twice a day, as needed. 15 Each 5    ibuprofen (MOTRIN) 800 MG Tab Take 1 Tablet by mouth every 8 hours as needed for Moderate Pain or Inflammation. 30 Tablet 0    simvastatin (ZOCOR) 40 MG Tab TAKE 1/2 TABLET BY MOUTH EVERY DAY 45 Tablet 1    triamcinolone  "acetonide (KENALOG) 0.1 % Cream APPLY TO FOREARMS, LEGS AS DIRECTED TWICE DAILY. DO NOT APPLY TO THE FACE.      desonide (TRIDESILON) 0.05 % Cream APPLY TO AFFECTED AREA TWICE DAILY. OK TO APPLY TO THE FACE      diclofenac sodium (VOLTAREN) 1 % Gel Apply 2 g topically 4 times a day as needed. 100 g 0    rivaroxaban (XARELTO) 20 MG Tab tablet Take 1 Tablet by mouth with dinner. 90 Tablet 3    traZODone (DESYREL) 50 MG Tab TAKE 1 TABLET BY MOUTH AT BEDTIME AS NEEDED FOR SLEEP 90 Tablet 3    VASCEPA 1 g Cap TAKE 1 CAPSULE BY MOUTH TWICE A  Capsule 1    divalproex (DEPAKOTE) 500 MG Tablet Delayed Response        No current facility-administered medications for this visit.     Past Medical/ Surgical History  He  has a past medical history of Arrhythmia (2011), Arthritis of left shoulder region (9/14/2018), Atypical atrial flutter (HCC) seen 12/2019, Chronic anticoagulation, H/O cocaine use (10/12/2013), H/O left inguinal hernia (3/26/2013), Heart burn, High cholesterol, Hypertension, Hypertriglyceridemia, Pneumonia (2018), Renal disorder (2011), and Right hydrocele (11/30/2018).  He  has a past surgical history that includes other (2011); gastroscopy-endo (10/13/2013); colonoscopy; colonoscopy (N/A, 9/10/2018); inguinal hernia repair (3/26/2013); and pr colonoscopy,diagnostic (5/26/2020).       Objective:     BP (!) 148/80 (BP Location: Left arm, Patient Position: Sitting, BP Cuff Size: Adult)   Pulse 89   Temp 36.7 °C (98 °F) (Temporal)   Ht 1.753 m (5' 9\")   Wt 75.1 kg (165 lb 9.6 oz)   SpO2 98%  Body mass index is 24.45 kg/m².    Physical Exam  Constitutional:       General: He is not in acute distress.  HENT:      Head: Normocephalic and atraumatic.   Eyes:      Conjunctiva/sclera: Conjunctivae normal.      Pupils: Pupils are equal, round, and reactive to light.   Pulmonary:      Effort: Pulmonary effort is normal. No respiratory distress.   Abdominal:      General: There is no distension. "   Musculoskeletal:      Cervical back: Normal range of motion and neck supple.   Skin:     General: Skin is warm and dry.      Findings: No rash.   Neurological:      Mental Status: He is alert and oriented to person, place, and time.      Gait: Gait is intact.   Psychiatric:         Mood and Affect: Affect normal.        Imaging:  No recent imaging to review    Labs  No recent labs to review  Assessment and Plan:   The following treatment plan was discussed     Problem List Items Addressed This Visit       Dermatitis     Acute.  Suspect contact dermatitis to no blisters are noted.  Due to the cracking and the constant itching, concerned for or bacterial infection.  Oral prednisone 40 mg x 3 days followed by to 20 mg for 2 days sent to his preferred pharmacy.  Prescription for Bactroban ordered to be applied to prevent possible infection.  Recommended thick emollient to help with moisturizing.         Relevant Medications    mupirocin (BACTROBAN) 2 % Ointment    predniSONE (DELTASONE) 20 MG Tab    Weight loss, unintentional     New to the patient.  Suspect increase in activity as a source of his weight loss.  Recommended to continue activity.  Will obtain labs to rule out other possible weight loss such as thyroid dysfunction, electrolyte imbalance.         Relevant Orders    CBC WITH DIFFERENTIAL    Comp Metabolic Panel    ESTIMATED GFR    TSH WITH REFLEX TO FT4     Other Visit Diagnoses       Immunization due        Relevant Orders    Influenza Vaccine, High Dose (65+ Only) (Completed)             Followup: Return if symptoms worsen or fail to improve.      Please note that this dictation was created using voice recognition software. I have made every reasonable attempt to correct obvious errors, but I expect that there are errors of grammar and possibly content that I did not discover before finalizing the note.

## 2022-09-20 DIAGNOSIS — G47.09 OTHER INSOMNIA: ICD-10-CM

## 2022-09-20 RX ORDER — TRAZODONE HYDROCHLORIDE 50 MG/1
TABLET ORAL
Qty: 90 TABLET | Refills: 3 | Status: SHIPPED | OUTPATIENT
Start: 2022-09-20 | End: 2023-09-01

## 2022-10-05 ENCOUNTER — TELEPHONE (OUTPATIENT)
Dept: CARDIOLOGY | Facility: MEDICAL CENTER | Age: 77
End: 2022-10-05
Payer: MEDICARE

## 2022-10-05 NOTE — TELEPHONE ENCOUNTER
"To CW, please advise on GI procedure, thank you    Last OV: 2/2022  Blood thinner: Xarelto  No Hx stent placement or heart valve replacement    =================    Received fax from [facility] (P: [phone] F: [phone]) requesting:    - cardiac clearance for upcoming \"colon w bx\" scheduled TBD.  - Xarelto hold instructions prior to procedure.    Fax sent to scanning for reference via [employer] completed status received.    "

## 2022-10-11 NOTE — TELEPHONE ENCOUNTER
He can proceed with the proposed procedure or surgery from a cardiac stanpoint, no modifiable cardiovascular risk, no further cardiac testing required, hold anticoagulation as necessary.    It is my pleasure to participate in the care of Mr. Souza.  Please do not hesitate to contact me with questions or concerns. [facility] is available 24/7 for consultative services at [phone] in the perioperative period.    Electronically Signed    Smooth Cruz MD PhD [facility]  Cardiologist [facility]    Please note that this dictation was created using voice recognition software. There may be errors I did not discover before finalizing the note.

## 2022-10-12 NOTE — TELEPHONE ENCOUNTER
Letter drafted with MD recommendations, electronically sent to pt via [employer], and electronically faxed to [phone] completed status see below.

## 2022-10-17 ENCOUNTER — OFFICE VISIT (OUTPATIENT)
Dept: MEDICAL GROUP | Facility: MEDICAL CENTER | Age: 77
End: 2022-10-17
Payer: MEDICARE

## 2022-10-17 VITALS
TEMPERATURE: 97.4 F | BODY MASS INDEX: 25.53 KG/M2 | DIASTOLIC BLOOD PRESSURE: 82 MMHG | HEART RATE: 94 BPM | SYSTOLIC BLOOD PRESSURE: 140 MMHG | HEIGHT: 69 IN | WEIGHT: 172.4 LBS | OXYGEN SATURATION: 97 %

## 2022-10-17 DIAGNOSIS — L30.9 DERMATITIS: ICD-10-CM

## 2022-10-17 PROBLEM — R21 RASH: Status: ACTIVE | Noted: 2022-10-17

## 2022-10-17 PROBLEM — R21 RASH: Status: RESOLVED | Noted: 2022-10-17 | Resolved: 2022-10-17

## 2022-10-17 PROCEDURE — 99213 OFFICE O/P EST LOW 20 MIN: CPT | Performed by: FAMILY MEDICINE

## 2022-10-17 RX ORDER — KETOCONAZOLE 20 MG/G
1 CREAM TOPICAL 2 TIMES DAILY
Qty: 30 G | Refills: 0 | Status: SHIPPED | OUTPATIENT
Start: 2022-10-17

## 2022-10-17 RX ORDER — BETAMETHASONE DIPROPIONATE 0.5 MG/G
CREAM TOPICAL
COMMUNITY
Start: 2022-09-20 | End: 2023-06-12

## 2022-10-17 RX ORDER — METHYLPREDNISOLONE 4 MG/1
TABLET ORAL
Qty: 21 TABLET | Refills: 0 | Status: SHIPPED | OUTPATIENT
Start: 2022-10-17 | End: 2023-06-12

## 2022-10-17 ASSESSMENT — FIBROSIS 4 INDEX: FIB4 SCORE: 1.642857142857142857

## 2022-10-17 NOTE — ASSESSMENT & PLAN NOTE
Subacute.  Exam reveals dry cracking bilateral hands, and circular pruritic lesions with dry and flaky skin noted to bilateral arms and legs.  Suspicious for possible fungal infection.  Description for ketoconazole cream sent to his preferred pharmacy  Medrol Dosepak sent to the with the itching.  Referral placed to dermatology if no improvement in his symptoms.

## 2022-10-17 NOTE — PROGRESS NOTES
Papo Souza is a pleasant 77 y.o. male here for   Chief Complaint   Patient presents with    Rash     X 5 days, itchy       HPI:    Problem   Dermatitis    Rash that started the beginning of September, at that time he noticed increase in dryness, skin cracking to bilateral hands and in between fingers.  Has had a rash similar to this in the past which steroid treatments were effective.  Rash then spread up his hands.  Was initiated on 5-day course of oral prednisone, rash improved but returned now spreading up his arms and moving over to his legs.  Does note increase in pruritus to see with rash.  Scratches himself pretty profusely at nights, currently taking Xarelto.  Scratching is causing a lot of bruising.     Rash (Resolved)          Current Medicines (including changes today)  Current Outpatient Medications   Medication Sig Dispense Refill    methylPREDNISolone (MEDROL DOSEPAK) 4 MG Tablet Therapy Pack As directed on the packaging label. 21 Tablet 0    ketoconazole (NIZORAL) 2 % Cream Apply 1 Application topically 2 times a day. 30 g 0    Aug Betamethasone Dipropionate (DIPROLENE-AF) 0.05 % Cream PLEASE SEE ATTACHED FOR DETAILED DIRECTIONS      traZODone (DESYREL) 50 MG Tab TAKE 1 TABLET BY MOUTH EVERY DAY AT BEDTIME AS NEEDED FOR SLEEP 90 Tablet 3    PARoxetine (PAXIL) 10 MG Tab TAKE 1 TABLET BY MOUTH EVERY DAY 90 Tablet 1    mupirocin (BACTROBAN) 2 % Ointment Apply 1 Application topically 2 times a day. 22 g 0    NON SPECIFIED C-NITROGLYCERIN 0.125% OINT SIG: 15, Refills: 2. Indiciation for use: Tenderness or swelling of minor hemorrhoids.  Directions for use: Apply to ravinder-rectal area twice a day, as needed. 15 Each 5    ibuprofen (MOTRIN) 800 MG Tab Take 1 Tablet by mouth every 8 hours as needed for Moderate Pain or Inflammation. 30 Tablet 0    simvastatin (ZOCOR) 40 MG Tab TAKE 1/2 TABLET BY MOUTH EVERY DAY 45 Tablet 1    triamcinolone acetonide (KENALOG) 0.1 % Cream APPLY TO FOREARMS, LEGS AS  "DIRECTED TWICE DAILY. DO NOT APPLY TO THE FACE.      desonide (TRIDESILON) 0.05 % Cream APPLY TO AFFECTED AREA TWICE DAILY. OK TO APPLY TO THE FACE      diclofenac sodium (VOLTAREN) 1 % Gel Apply 2 g topically 4 times a day as needed. 100 g 0    rivaroxaban (XARELTO) 20 MG Tab tablet Take 1 Tablet by mouth with dinner. 90 Tablet 3    VASCEPA 1 g Cap TAKE 1 CAPSULE BY MOUTH TWICE A  Capsule 1    divalproex (DEPAKOTE) 500 MG Tablet Delayed Response        No current facility-administered medications for this visit.     Past Medical/ Surgical History  He  has a past medical history of Arrhythmia (2011), Arthritis of left shoulder region (9/14/2018), Atypical atrial flutter (HCC) seen 12/2019, Chronic anticoagulation, H/O cocaine use (10/12/2013), H/O left inguinal hernia (3/26/2013), Heart burn, High cholesterol, Hypertension, Hypertriglyceridemia, Pneumonia (2018), Renal disorder (2011), and Right hydrocele (11/30/2018).  He  has a past surgical history that includes other (2011); gastroscopy-endo (10/13/2013); colonoscopy; colonoscopy (N/A, 9/10/2018); inguinal hernia repair (3/26/2013); and pr colonoscopy,diagnostic (5/26/2020).       Objective:     BP (!) 140/82 (BP Location: Left arm, Patient Position: Sitting, BP Cuff Size: Adult)   Pulse 94   Temp 36.3 °C (97.4 °F) (Temporal)   Ht 1.753 m (5' 9\")   Wt 78.2 kg (172 lb 6.4 oz)   SpO2 97%  Body mass index is 25.46 kg/m².    Physical Exam  Constitutional:       General: He is not in acute distress.  HENT:      Head: Normocephalic and atraumatic.   Eyes:      Conjunctiva/sclera: Conjunctivae normal.      Pupils: Pupils are equal, round, and reactive to light.   Pulmonary:      Effort: Pulmonary effort is normal. No respiratory distress.   Abdominal:      General: There is no distension.   Musculoskeletal:      Cervical back: Normal range of motion and neck supple.   Skin:     General: Skin is warm and dry.      Findings: Rash present. Rash is macular and " scaling.      Comments: Drying, scaling, cracking to bilateral hands.  Raised dry macular circular lesions with central clearing scattered to bilateral upper thigh, upper arms and forearms   Neurological:      Mental Status: He is alert and oriented to person, place, and time.      Gait: Gait is intact.   Psychiatric:         Mood and Affect: Affect normal.        Imaging:  No imaging to review    Labs  No labs to review  Assessment and Plan:   The following treatment plan was discussed     Problem List Items Addressed This Visit       Dermatitis     Subacute.  Exam reveals dry cracking bilateral hands, and circular pruritic lesions with dry and flaky skin noted to bilateral arms and legs.  Suspicious for possible fungal infection.  Description for ketoconazole cream sent to his preferred pharmacy  Medrol Dosepak sent to the with the itching.  Referral placed to dermatology if no improvement in his symptoms.         Relevant Medications    methylPREDNISolone (MEDROL DOSEPAK) 4 MG Tablet Therapy Pack    ketoconazole (NIZORAL) 2 % Cream    Other Relevant Orders    Referral to Dermatology        Followup: Return if symptoms worsen or fail to improve.        Please note that this dictation was created using voice recognition software. I have made every reasonable attempt to correct obvious errors, but I expect that there are errors of grammar and possibly content that I did not discover before finalizing the note.

## 2022-10-21 ENCOUNTER — HOSPITAL ENCOUNTER (OUTPATIENT)
Dept: LAB | Facility: MEDICAL CENTER | Age: 77
End: 2022-10-21
Attending: FAMILY MEDICINE
Payer: MEDICARE

## 2022-10-21 DIAGNOSIS — R63.4 WEIGHT LOSS, UNINTENTIONAL: ICD-10-CM

## 2022-10-21 LAB
ALBUMIN SERPL BCP-MCNC: 3.9 G/DL (ref 3.2–4.9)
ALBUMIN/GLOB SERPL: 1.8 G/DL
ALP SERPL-CCNC: 82 U/L (ref 30–99)
ALT SERPL-CCNC: 12 U/L (ref 2–50)
ANION GAP SERPL CALC-SCNC: 11 MMOL/L (ref 7–16)
AST SERPL-CCNC: 8 U/L (ref 12–45)
BASOPHILS # BLD AUTO: 0.7 % (ref 0–1.8)
BASOPHILS # BLD: 0.04 K/UL (ref 0–0.12)
BILIRUB SERPL-MCNC: 0.4 MG/DL (ref 0.1–1.5)
BUN SERPL-MCNC: 16 MG/DL (ref 8–22)
CALCIUM SERPL-MCNC: 9.1 MG/DL (ref 8.5–10.5)
CHLORIDE SERPL-SCNC: 105 MMOL/L (ref 96–112)
CO2 SERPL-SCNC: 26 MMOL/L (ref 20–33)
CREAT SERPL-MCNC: 0.79 MG/DL (ref 0.5–1.4)
EOSINOPHIL # BLD AUTO: 0.02 K/UL (ref 0–0.51)
EOSINOPHIL NFR BLD: 0.4 % (ref 0–6.9)
ERYTHROCYTE [DISTWIDTH] IN BLOOD BY AUTOMATED COUNT: 47.7 FL (ref 35.9–50)
FASTING STATUS PATIENT QL REPORTED: NORMAL
GFR SERPLBLD CREATININE-BSD FMLA CKD-EPI: 93 ML/MIN/1.73 M 2
GLOBULIN SER CALC-MCNC: 2.2 G/DL (ref 1.9–3.5)
GLUCOSE SERPL-MCNC: 75 MG/DL (ref 65–99)
HCT VFR BLD AUTO: 42.6 % (ref 42–52)
HGB BLD-MCNC: 13.5 G/DL (ref 14–18)
IMM GRANULOCYTES # BLD AUTO: 0.07 K/UL (ref 0–0.11)
IMM GRANULOCYTES NFR BLD AUTO: 1.2 % (ref 0–0.9)
LYMPHOCYTES # BLD AUTO: 1.44 K/UL (ref 1–4.8)
LYMPHOCYTES NFR BLD: 25.3 % (ref 22–41)
MCH RBC QN AUTO: 29.2 PG (ref 27–33)
MCHC RBC AUTO-ENTMCNC: 31.7 G/DL (ref 33.7–35.3)
MCV RBC AUTO: 92.2 FL (ref 81.4–97.8)
MONOCYTES # BLD AUTO: 0.6 K/UL (ref 0–0.85)
MONOCYTES NFR BLD AUTO: 10.5 % (ref 0–13.4)
NEUTROPHILS # BLD AUTO: 3.52 K/UL (ref 1.82–7.42)
NEUTROPHILS NFR BLD: 61.9 % (ref 44–72)
NRBC # BLD AUTO: 0 K/UL
NRBC BLD-RTO: 0 /100 WBC
PLATELET # BLD AUTO: 218 K/UL (ref 164–446)
PMV BLD AUTO: 9 FL (ref 9–12.9)
POTASSIUM SERPL-SCNC: 3.8 MMOL/L (ref 3.6–5.5)
PROT SERPL-MCNC: 6.1 G/DL (ref 6–8.2)
RBC # BLD AUTO: 4.62 M/UL (ref 4.7–6.1)
SODIUM SERPL-SCNC: 142 MMOL/L (ref 135–145)
TSH SERPL DL<=0.005 MIU/L-ACNC: 3.81 UIU/ML (ref 0.38–5.33)
WBC # BLD AUTO: 5.7 K/UL (ref 4.8–10.8)

## 2022-10-21 PROCEDURE — 36415 COLL VENOUS BLD VENIPUNCTURE: CPT

## 2022-10-21 PROCEDURE — 80053 COMPREHEN METABOLIC PANEL: CPT

## 2022-10-21 PROCEDURE — 84443 ASSAY THYROID STIM HORMONE: CPT

## 2022-10-21 PROCEDURE — 85025 COMPLETE CBC W/AUTO DIFF WBC: CPT

## 2022-11-03 ENCOUNTER — PRE-ADMISSION TESTING (OUTPATIENT)
Dept: ADMISSIONS | Facility: MEDICAL CENTER | Age: 77
End: 2022-11-03
Attending: INTERNAL MEDICINE
Payer: MEDICARE

## 2022-11-03 DIAGNOSIS — Z01.810 PRE-OPERATIVE CARDIOVASCULAR EXAMINATION: ICD-10-CM

## 2022-11-03 LAB — EKG IMPRESSION: NORMAL

## 2022-11-03 PROCEDURE — 93005 ELECTROCARDIOGRAM TRACING: CPT

## 2022-11-03 PROCEDURE — 93010 ELECTROCARDIOGRAM REPORT: CPT | Performed by: INTERNAL MEDICINE

## 2022-11-03 ASSESSMENT — FIBROSIS 4 INDEX: FIB4 SCORE: 0.79

## 2022-11-03 NOTE — PREPROCEDURE INSTRUCTIONS
Pre-admit appointment completed. Pt states all instructions given are understood. Medications the patient will take the morning of surgery per anesthesia protocol: None    Denies anesthesia complications  Pt states he will follow the Colonoscopy prep instructions given by the 's office

## 2022-11-10 ENCOUNTER — PATIENT MESSAGE (OUTPATIENT)
Dept: HEALTH INFORMATION MANAGEMENT | Facility: OTHER | Age: 77
End: 2022-11-10

## 2022-11-16 ENCOUNTER — ANESTHESIA (OUTPATIENT)
Dept: SURGERY | Facility: MEDICAL CENTER | Age: 77
End: 2022-11-16
Payer: MEDICARE

## 2022-11-16 ENCOUNTER — ANESTHESIA EVENT (OUTPATIENT)
Dept: SURGERY | Facility: MEDICAL CENTER | Age: 77
End: 2022-11-16
Payer: MEDICARE

## 2022-11-16 ENCOUNTER — HOSPITAL ENCOUNTER (OUTPATIENT)
Facility: MEDICAL CENTER | Age: 77
End: 2022-11-16
Attending: INTERNAL MEDICINE | Admitting: INTERNAL MEDICINE
Payer: MEDICARE

## 2022-11-16 VITALS
OXYGEN SATURATION: 96 % | HEART RATE: 60 BPM | RESPIRATION RATE: 16 BRPM | WEIGHT: 165.57 LBS | HEIGHT: 70 IN | TEMPERATURE: 97.2 F | BODY MASS INDEX: 23.7 KG/M2 | SYSTOLIC BLOOD PRESSURE: 154 MMHG | DIASTOLIC BLOOD PRESSURE: 83 MMHG

## 2022-11-16 LAB — PATHOLOGY CONSULT NOTE: NORMAL

## 2022-11-16 PROCEDURE — 160046 HCHG PACU - 1ST 60 MINS PHASE II: Performed by: INTERNAL MEDICINE

## 2022-11-16 PROCEDURE — 160035 HCHG PACU - 1ST 60 MINS PHASE I: Performed by: INTERNAL MEDICINE

## 2022-11-16 PROCEDURE — 99100 ANES PT EXTEME AGE<1 YR&>70: CPT | Performed by: ANESTHESIOLOGY

## 2022-11-16 PROCEDURE — 160208 HCHG ENDO MINUTES - EA ADDL 1 MIN LEVEL 4: Performed by: INTERNAL MEDICINE

## 2022-11-16 PROCEDURE — 160025 RECOVERY II MINUTES (STATS): Performed by: INTERNAL MEDICINE

## 2022-11-16 PROCEDURE — 700101 HCHG RX REV CODE 250: Performed by: ANESTHESIOLOGY

## 2022-11-16 PROCEDURE — 160009 HCHG ANES TIME/MIN: Performed by: INTERNAL MEDICINE

## 2022-11-16 PROCEDURE — 88305 TISSUE EXAM BY PATHOLOGIST: CPT

## 2022-11-16 PROCEDURE — 700105 HCHG RX REV CODE 258: Performed by: INTERNAL MEDICINE

## 2022-11-16 PROCEDURE — 160048 HCHG OR STATISTICAL LEVEL 1-5: Performed by: INTERNAL MEDICINE

## 2022-11-16 PROCEDURE — 00812 ANES LWR INTST SCR COLSC: CPT | Performed by: ANESTHESIOLOGY

## 2022-11-16 PROCEDURE — 700111 HCHG RX REV CODE 636 W/ 250 OVERRIDE (IP): Performed by: ANESTHESIOLOGY

## 2022-11-16 PROCEDURE — 160203 HCHG ENDO MINUTES - 1ST 30 MINS LEVEL 4: Performed by: INTERNAL MEDICINE

## 2022-11-16 PROCEDURE — 160002 HCHG RECOVERY MINUTES (STAT): Performed by: INTERNAL MEDICINE

## 2022-11-16 RX ORDER — LIDOCAINE HYDROCHLORIDE 20 MG/ML
INJECTION, SOLUTION EPIDURAL; INFILTRATION; INTRACAUDAL; PERINEURAL PRN
Status: DISCONTINUED | OUTPATIENT
Start: 2022-11-16 | End: 2022-11-16 | Stop reason: HOSPADM

## 2022-11-16 RX ORDER — HALOPERIDOL 5 MG/ML
1 INJECTION INTRAMUSCULAR
Status: DISCONTINUED | OUTPATIENT
Start: 2022-11-16 | End: 2022-11-16 | Stop reason: HOSPADM

## 2022-11-16 RX ORDER — ONDANSETRON 2 MG/ML
4 INJECTION INTRAMUSCULAR; INTRAVENOUS
Status: DISCONTINUED | OUTPATIENT
Start: 2022-11-16 | End: 2022-11-16 | Stop reason: HOSPADM

## 2022-11-16 RX ORDER — DIPHENHYDRAMINE HYDROCHLORIDE 50 MG/ML
12.5 INJECTION INTRAMUSCULAR; INTRAVENOUS
Status: DISCONTINUED | OUTPATIENT
Start: 2022-11-16 | End: 2022-11-16 | Stop reason: HOSPADM

## 2022-11-16 RX ORDER — SODIUM CHLORIDE, SODIUM LACTATE, POTASSIUM CHLORIDE, CALCIUM CHLORIDE 600; 310; 30; 20 MG/100ML; MG/100ML; MG/100ML; MG/100ML
INJECTION, SOLUTION INTRAVENOUS CONTINUOUS
Status: DISCONTINUED | OUTPATIENT
Start: 2022-11-16 | End: 2022-11-16 | Stop reason: HOSPADM

## 2022-11-16 RX ADMIN — PROPOFOL 150 MG: 10 INJECTION, EMULSION INTRAVENOUS at 07:53

## 2022-11-16 RX ADMIN — LIDOCAINE HYDROCHLORIDE 100 MG: 20 INJECTION, SOLUTION EPIDURAL; INFILTRATION; INTRACAUDAL at 07:53

## 2022-11-16 RX ADMIN — SODIUM CHLORIDE, POTASSIUM CHLORIDE, SODIUM LACTATE AND CALCIUM CHLORIDE: 600; 310; 30; 20 INJECTION, SOLUTION INTRAVENOUS at 06:13

## 2022-11-16 ASSESSMENT — PAIN DESCRIPTION - PAIN TYPE: TYPE: ACUTE PAIN

## 2022-11-16 ASSESSMENT — PAIN SCALES - GENERAL: PAIN_LEVEL: 0

## 2022-11-16 NOTE — ANESTHESIA PROCEDURE NOTES
Airway    Date/Time: 11/16/2022 7:53 AM  Performed by: Cornelio Roberts M.D.  Authorized by: Cornelio Roberts M.D.     Location:  OR  Urgency:  Elective  Indications for Airway Management:  Anesthesia      Spontaneous Ventilation: absent    Sedation Level:  Deep  Preoxygenated: Yes    Final Airway Type:  Supraglottic airway  Final Supraglottic Airway:  Standard LMA    SGA Size:  4  Number of Attempts at Approach:  1

## 2022-11-16 NOTE — ANESTHESIA POSTPROCEDURE EVALUATION
Patient: Papo Souza    Procedure Summary     Date: 11/16/22 Room / Location:  ENDOSCOPIC ULTRASOUND ROOM / SURGERY [facility]    Anesthesia Start: 0744 Anesthesia Stop: 0817    Procedure: COLONOSCOPY Diagnosis:       Colon polyp      (Colon polyp [[id number]])    Surgeons: Evelio Mackenzie M.D. Responsible Provider: Cornelio Roberts M.D.    Anesthesia Type: general ASA Status: 2          Final Anesthesia Type: general  Last vitals  BP   Blood Pressure : (!) 143/80    Temp   36.6 °C (97.9 °F)    Pulse   70   Resp        SpO2   96 %      Anesthesia Post Evaluation    Patient location during evaluation: PACU  Patient participation: complete - patient participated  Level of consciousness: awake and alert  Pain score: 0    Airway patency: patent  Anesthetic complications: no  Cardiovascular status: hemodynamically stable  Respiratory status: acceptable  Hydration status: euvolemic    PONV: none          There were no known notable events for this encounter.     Nurse Pain Score: 0 (NPRS)

## 2022-11-16 NOTE — ANESTHESIA PREPROCEDURE EVALUATION
Case: [id number] Date/Time: 11/16/22 0715    Procedure: COLONOSCOPY    Pre-op diagnosis: HISTORY OF COLON POLYPS    Location:  ENDOSCOPIC ULTRASOUND ROOM / SURGERY [facility]    Surgeons: Evelio Mackenzie M.D.          Relevant Problems   CARDIAC   (positive) Atypical atrial flutter (HCC) seen 12/2019   (positive) Essential hypertension   (positive) Hemorrhoids   (positive) PAF (paroxysmal atrial fibrillation) (HCC)         (positive) Hepatic steatosis       Physical Exam    Airway   Mallampati: II  TM distance: >3 FB  Neck ROM: full       Cardiovascular - normal exam  Rhythm: regular  Rate: normal  (-) murmur     Dental - normal exam           Pulmonary - normal exam  Breath sounds clear to auscultation     Abdominal    Neurological - normal exam                 Anesthesia Plan    ASA 2       Plan - general       Airway plan will be LMA          Induction: intravenous    Postoperative Plan: Postoperative administration of opioids is intended.    Pertinent diagnostic labs and testing reviewed    Informed Consent:    Anesthetic plan and risks discussed with patient.    Use of blood products discussed with: patient whom consented to blood products.

## 2022-11-16 NOTE — ANESTHESIA TIME REPORT
Anesthesia Start and Stop Event Times     Date Time Event    11/16/2022 0630 Ready for Procedure     0744 Anesthesia Start     0817 Anesthesia Stop        Responsible Staff  11/16/22    Name Role Begin End    Cornelio Roberts M.D. Anesth 0744 0817        Overtime Reason:  no overtime (within assigned shift)    Comments:

## 2022-11-16 NOTE — OR NURSING
0815: pt to PACU from [facility] via [facility]. Report received from anesthesia and RN. OPA in place, breathing spontaneous and nonlabored on 6 L O2 via mask. Abdomen soft and nontender.     0825: rouses to voice, OPA removed.     0830: denies pain or nausea. Tolerating sips of water. Pt's friend/ride called and updated.     0845: meets criteria for stage 2    0850: report called to Cortney CARLTON    0853: discharged to stage 2 via [facility] by CNA

## 2022-11-16 NOTE — OR SURGEON
Post OP Note    PreOp Diagnosis: History of colon polyps      PostOp Diagnosis:    1/small 4 mm sessile polyp appreciated in the ascending colon, polypectomy with biopsy forceps cold cautery removed in entirety and retrieved.   2/6 mm sessile polyps snared with cold cautery removed and retrieved in entirety   3/diverticulosis throughout   4/prep quality is fair   5/internal hemorrhoids grade 2-3   6/normal ileum   7/otherwise normal colonoscopy      Procedure(s):  COLONOSCOPY - Wound Class: Clean Contaminated    Surgeon(s):  Evelio Mackenzie M.D.    Anesthesiologist/Type of Anesthesia:  Anesthesiologist: Cornelio Roberts M.D./LISA    Surgical Staff:  Circulator: Dallin Mary R.N.  Endoscopy Technician: Eloina Street; Rashard Dallas; Yandy Paz; Melany Garcia    Specimens removed if any:  ID Type Source Tests Collected by Time Destination   A : polyp bx Tissue Colon - Ascending PATHOLOGY SPECIMEN Evelio Mackenzie M.D. 11/16/2022  8:00 AM    B : polyp bx Tissue Rectal PATHOLOGY SPECIMEN Evelio Mackenzie M.D. 11/16/2022  8:08 AM        Estimated Blood Loss: None    Findings:   Prior to the procedure the patient was informed the risk and benefits of the procedure and freely signed the consent form.  He was monitored in standard monitoring blood pressure oxygen heart monitor no appreciable abnormalities noted during the procedure.  Once adequate sedation was achieved he was placed in left lateral decubitus position.  Digital rectal exam is nontender no internal or external hemorrhoids were appreciated.  Using the standard Olympus variable stiffness adult colonoscope was introduced under manual insertion passed the cecum identified by appendiceal orifice ileocecal valve and cecal cap.  Preparation was fair.  Ileocecal valve was identified intubated ileum appeared normal.  Extubation ileocecal valve and appreciation of the ascending colon where there is a small 4 mm sessile polyp which was  removed using cold cautery biopsy forceps and retrieved in entirety.  Copious amounts of irrigation was employed throughout the procedure to try to irrigate as much of the colonic mucosa as possible for visualization.  This allowed for visualization of polyps perhaps greater than 6 mm in size.  Gradual withdrawal colonoscope without appreciation of the transverse descending and sigmoid colons all which appeared normal there is diverticulosis appreciated throughout the colon.  Within the rectum there is a 6 mm sessile polyp which was snared removed and retrieved using cold cautery snare and retrieved.  Rectum otherwise appeared normal.  Retroflexion was performed the rectum to evaluate the rectal anal verge and there is some internal hemorrhoids grade 2-3 appreciated.  Colonoscope was straightened excess air was removed patient tolerated the procedure well.    Complications: None    Recommendations    Await pathology findings, if consistent with tubular adenomatous polyps recommend repeat colonoscopy in 2 years  Restart Xarelto tomorrow  Follow-up with primary care physician further healthcare needs  Increase fruit and fiber and exercise  Yearly Hemoccult    If there are any further questions or concerns please feel free to give us call at [phone].      11/16/2022 8:13 AM Evelio Mackenzie M.D.

## 2022-11-16 NOTE — OR NURSING
Patient allergies and NPO status verified, home medication reconciliation completed and belongings secured. Pt verbalizes understanding of pain scale, expected course of stay, and plan of care. Scheduled procedure verified w/ pt. IV access established.

## 2022-11-16 NOTE — OR NURSING
0853- Patient arrived to phase 2. Patient changed out of surgical gown into clothes. Patient is A&Ox4. Patient reports no pain and no nausea. Vital signs taken and patient assessed. Asked the patient if they had to use the bathroom. The patient was assisted to the bathroom to void.    0905- IV removed and discharge instructions read. All questions answered.     0985- CNA transported patient via wheelchair to ride. Discharged to care of responsible adult.

## 2022-11-16 NOTE — DISCHARGE INSTRUCTIONS
If any questions arise, call your provider.  If your provider is not available, please feel free to call the Surgical Center at [phone].    MEDICATIONS: Resume taking daily medication.  Take prescribed pain medication with food.  If no medication is prescribed, you may take non-aspirin pain medication if needed.  PAIN MEDICATION CAN BE VERY CONSTIPATING.  Take a stool softener or laxative such as senokot, pericolace, or milk of magnesia if needed.    Last pain medication given at     What to Expect Post Anesthesia    Rest and take it easy for the first 24 hours.  A responsible adult is recommended to remain with you during that time.  It is normal to feel sleepy.  We encourage you to not do anything that requires balance, judgment or coordination.    FOR 24 HOURS DO NOT:  Drive, operate machinery or run household appliances.  Drink beer or alcoholic beverages.  Make important decisions or sign legal documents.    To avoid nausea, slowly advance diet as tolerated, avoiding spicy or greasy foods for the first day.  Add more substantial food to your diet according to your provider's instructions.  Babies can be fed formula or breast milk as soon as they are hungry.  INCREASE FLUIDS AND FIBER TO AVOID CONSTIPATION.    MILD FLU-LIKE SYMPTOMS ARE NORMAL.  YOU MAY EXPERIENCE GENERALIZED MUSCLE ACHES, THROAT IRRITATION, HEADACHE AND/OR SOME NAUSEA.      Await pathology findings, if consistent with tubular adenomatous polyps recommend repeat colonoscopy in 2 years  Restart Xarelto tomorrow  Follow-up with primary care physician further healthcare needs  Increase fruit and fiber and exercise  Yearly Hemoccult    ENDOSCOPY HOME CARE INSTRUCTIONS        COLONOSCOPY OR FLEXIBLE SIGMOIDOSCOPY  1. If you received a barium enema, take a mild laxative such as dulcolax, Deyanira's M.O., or Milk of Magnesia to clean out the barium.  2. Drink plenty of fluids. Eat a diet high in fiber (whole-grain breads, fresh fruit and  vegetables).  3. You may notice a few drops of blood with your first bowel movement. If you develop any large amount of bleeding, black stools, a fever, or abdominal pain, call your doctor right away.  4. Call your doctor for test results in  .  5. Don't drive or drink alcohol for 24 hours. The medication you received will make you too drowsy.  6. No heavy lifting, no Aspirin products or Aspirin for 5 days   7. Additional instructions:   8. Prescriptions: Restart Xarelto tomorrow 11/17    You should call 911 if you develop problems with breathing or chest pain.  If any questions arise, call your doctor. If your doctor is not available, please feel free to call [phone]. You can also call the [employer] [facility]OTLINE open 24 hours/day, 7 days/week and speak to a nurse at [phone], or toll free [phone].

## 2023-03-10 DIAGNOSIS — K64.9 HEMORRHOIDS, UNSPECIFIED HEMORRHOID TYPE: ICD-10-CM

## 2023-03-10 NOTE — TELEPHONE ENCOUNTER
Received request via: Pharmacy    Was the patient seen in the last year in this department? Yes  10/17/2022  Does the patient have an active prescription (recently filled or refills available) for medication(s) requested? No    Does the patient have alf Plus and need 100 day supply (blood pressure, diabetes and cholesterol meds only)? Patient does not have SCP

## 2023-03-31 RX ORDER — NITROGLYCERIN 20 MG/G
OINTMENT TOPICAL
Qty: 15 G | Refills: 2 | Status: SHIPPED | OUTPATIENT
Start: 2023-03-31

## 2023-06-09 SDOH — HEALTH STABILITY: PHYSICAL HEALTH: ON AVERAGE, HOW MANY DAYS PER WEEK DO YOU ENGAGE IN MODERATE TO STRENUOUS EXERCISE (LIKE A BRISK WALK)?: 3 DAYS

## 2023-06-09 SDOH — ECONOMIC STABILITY: HOUSING INSECURITY
IN THE LAST 12 MONTHS, WAS THERE A TIME WHEN YOU DID NOT HAVE A STEADY PLACE TO SLEEP OR SLEPT IN A SHELTER (INCLUDING NOW)?: NO

## 2023-06-09 SDOH — HEALTH STABILITY: PHYSICAL HEALTH: ON AVERAGE, HOW MANY MINUTES DO YOU ENGAGE IN EXERCISE AT THIS LEVEL?: 70 MIN

## 2023-06-09 SDOH — ECONOMIC STABILITY: HOUSING INSECURITY: IN THE LAST 12 MONTHS, HOW MANY PLACES HAVE YOU LIVED?: 1

## 2023-06-09 SDOH — ECONOMIC STABILITY: TRANSPORTATION INSECURITY
IN THE PAST 12 MONTHS, HAS THE LACK OF TRANSPORTATION KEPT YOU FROM MEDICAL APPOINTMENTS OR FROM GETTING MEDICATIONS?: NO

## 2023-06-09 SDOH — ECONOMIC STABILITY: FOOD INSECURITY: WITHIN THE PAST 12 MONTHS, YOU WORRIED THAT YOUR FOOD WOULD RUN OUT BEFORE YOU GOT MONEY TO BUY MORE.: NEVER TRUE

## 2023-06-09 SDOH — ECONOMIC STABILITY: INCOME INSECURITY: IN THE LAST 12 MONTHS, WAS THERE A TIME WHEN YOU WERE NOT ABLE TO PAY THE MORTGAGE OR RENT ON TIME?: NO

## 2023-06-09 SDOH — ECONOMIC STABILITY: INCOME INSECURITY: HOW HARD IS IT FOR YOU TO PAY FOR THE VERY BASICS LIKE FOOD, HOUSING, MEDICAL CARE, AND HEATING?: NOT HARD AT ALL

## 2023-06-09 SDOH — ECONOMIC STABILITY: TRANSPORTATION INSECURITY
IN THE PAST 12 MONTHS, HAS LACK OF TRANSPORTATION KEPT YOU FROM MEETINGS, WORK, OR FROM GETTING THINGS NEEDED FOR DAILY LIVING?: NO

## 2023-06-09 SDOH — ECONOMIC STABILITY: FOOD INSECURITY: WITHIN THE PAST 12 MONTHS, THE FOOD YOU BOUGHT JUST DIDN'T LAST AND YOU DIDN'T HAVE MONEY TO GET MORE.: NEVER TRUE

## 2023-06-09 SDOH — HEALTH STABILITY: MENTAL HEALTH
STRESS IS WHEN SOMEONE FEELS TENSE, NERVOUS, ANXIOUS, OR CAN'T SLEEP AT NIGHT BECAUSE THEIR MIND IS TROUBLED. HOW STRESSED ARE YOU?: NOT AT ALL

## 2023-06-09 SDOH — ECONOMIC STABILITY: TRANSPORTATION INSECURITY
IN THE PAST 12 MONTHS, HAS LACK OF RELIABLE TRANSPORTATION KEPT YOU FROM MEDICAL APPOINTMENTS, MEETINGS, WORK OR FROM GETTING THINGS NEEDED FOR DAILY LIVING?: NO

## 2023-06-09 ASSESSMENT — SOCIAL DETERMINANTS OF HEALTH (SDOH)
HOW OFTEN DO YOU ATTENT MEETINGS OF THE CLUB OR ORGANIZATION YOU BELONG TO?: MORE THAN 4 TIMES PER YEAR
HOW OFTEN DO YOU ATTEND CHURCH OR RELIGIOUS SERVICES?: MORE THAN 4 TIMES PER YEAR
HOW OFTEN DO YOU ATTENT MEETINGS OF THE CLUB OR ORGANIZATION YOU BELONG TO?: MORE THAN 4 TIMES PER YEAR
DO YOU BELONG TO ANY CLUBS OR ORGANIZATIONS SUCH AS CHURCH GROUPS UNIONS, FRATERNAL OR ATHLETIC GROUPS, OR SCHOOL GROUPS?: YES
HOW HARD IS IT FOR YOU TO PAY FOR THE VERY BASICS LIKE FOOD, HOUSING, MEDICAL CARE, AND HEATING?: NOT HARD AT ALL
HOW OFTEN DO YOU ATTEND CHURCH OR RELIGIOUS SERVICES?: MORE THAN 4 TIMES PER YEAR
DO YOU BELONG TO ANY CLUBS OR ORGANIZATIONS SUCH AS CHURCH GROUPS UNIONS, FRATERNAL OR ATHLETIC GROUPS, OR SCHOOL GROUPS?: YES
HOW MANY DRINKS CONTAINING ALCOHOL DO YOU HAVE ON A TYPICAL DAY WHEN YOU ARE DRINKING: 1 OR 2
WITHIN THE PAST 12 MONTHS, YOU WORRIED THAT YOUR FOOD WOULD RUN OUT BEFORE YOU GOT THE MONEY TO BUY MORE: NEVER TRUE
HOW OFTEN DO YOU GET TOGETHER WITH FRIENDS OR RELATIVES?: TWICE A WEEK
HOW OFTEN DO YOU GET TOGETHER WITH FRIENDS OR RELATIVES?: TWICE A WEEK
HOW OFTEN DO YOU HAVE A DRINK CONTAINING ALCOHOL: 4 OR MORE TIMES A WEEK
IN A TYPICAL WEEK, HOW MANY TIMES DO YOU TALK ON THE PHONE WITH FAMILY, FRIENDS, OR NEIGHBORS?: MORE THAN THREE TIMES A WEEK
IN A TYPICAL WEEK, HOW MANY TIMES DO YOU TALK ON THE PHONE WITH FAMILY, FRIENDS, OR NEIGHBORS?: MORE THAN THREE TIMES A WEEK
HOW OFTEN DO YOU HAVE SIX OR MORE DRINKS ON ONE OCCASION: NEVER

## 2023-06-09 ASSESSMENT — LIFESTYLE VARIABLES
AUDIT-C TOTAL SCORE: 4
HOW OFTEN DO YOU HAVE A DRINK CONTAINING ALCOHOL: 4 OR MORE TIMES A WEEK
HOW MANY STANDARD DRINKS CONTAINING ALCOHOL DO YOU HAVE ON A TYPICAL DAY: 1 OR 2
HOW OFTEN DO YOU HAVE SIX OR MORE DRINKS ON ONE OCCASION: NEVER
SKIP TO QUESTIONS 9-10: 1

## 2023-06-12 ENCOUNTER — OFFICE VISIT (OUTPATIENT)
Dept: MEDICAL GROUP | Facility: MEDICAL CENTER | Age: 78
End: 2023-06-12
Payer: MEDICARE

## 2023-06-12 VITALS
BODY MASS INDEX: 24.93 KG/M2 | HEIGHT: 70 IN | HEART RATE: 86 BPM | SYSTOLIC BLOOD PRESSURE: 108 MMHG | RESPIRATION RATE: 16 BRPM | TEMPERATURE: 97.4 F | OXYGEN SATURATION: 97 % | WEIGHT: 174.16 LBS | DIASTOLIC BLOOD PRESSURE: 62 MMHG

## 2023-06-12 DIAGNOSIS — R05.1 ACUTE COUGH: ICD-10-CM

## 2023-06-12 DIAGNOSIS — I48.0 PAF (PAROXYSMAL ATRIAL FIBRILLATION) (HCC): ICD-10-CM

## 2023-06-12 DIAGNOSIS — F33.0 MILD EPISODE OF RECURRENT MAJOR DEPRESSIVE DISORDER (HCC): ICD-10-CM

## 2023-06-12 DIAGNOSIS — I10 ESSENTIAL HYPERTENSION: ICD-10-CM

## 2023-06-12 DIAGNOSIS — R73.03 PREDIABETES: ICD-10-CM

## 2023-06-12 DIAGNOSIS — J30.2 SEASONAL ALLERGIES: ICD-10-CM

## 2023-06-12 DIAGNOSIS — E78.5 DYSLIPIDEMIA: Chronic | ICD-10-CM

## 2023-06-12 DIAGNOSIS — Z00.00 ENCOUNTER FOR ANNUAL WELLNESS VISIT (AWV) IN MEDICARE PATIENT: Primary | ICD-10-CM

## 2023-06-12 DIAGNOSIS — G47.09 OTHER INSOMNIA: ICD-10-CM

## 2023-06-12 DIAGNOSIS — I48.4 ATYPICAL ATRIAL FLUTTER (HCC): ICD-10-CM

## 2023-06-12 DIAGNOSIS — Z13.29 THYROID DISORDER SCREENING: ICD-10-CM

## 2023-06-12 PROCEDURE — 99214 OFFICE O/P EST MOD 30 MIN: CPT | Mod: 25 | Performed by: FAMILY MEDICINE

## 2023-06-12 PROCEDURE — 3074F SYST BP LT 130 MM HG: CPT | Performed by: FAMILY MEDICINE

## 2023-06-12 PROCEDURE — G0439 PPPS, SUBSEQ VISIT: HCPCS | Performed by: FAMILY MEDICINE

## 2023-06-12 PROCEDURE — 3078F DIAST BP <80 MM HG: CPT | Performed by: FAMILY MEDICINE

## 2023-06-12 RX ORDER — MONTELUKAST SODIUM 10 MG/1
10 TABLET ORAL DAILY
Qty: 90 TABLET | Refills: 3 | Status: SHIPPED | OUTPATIENT
Start: 2023-06-12

## 2023-06-12 RX ORDER — AZELASTINE 1 MG/ML
1 SPRAY, METERED NASAL 2 TIMES DAILY
Qty: 30 ML | Refills: 5 | Status: SHIPPED | OUTPATIENT
Start: 2023-06-12 | End: 2023-11-03

## 2023-06-12 RX ORDER — DUPILUMAB 300 MG/2ML
INJECTION, SOLUTION SUBCUTANEOUS
COMMUNITY
Start: 2023-05-08

## 2023-06-12 ASSESSMENT — PATIENT HEALTH QUESTIONNAIRE - PHQ9
5. POOR APPETITE OR OVEREATING: NOT AT ALL
8. MOVING OR SPEAKING SO SLOWLY THAT OTHER PEOPLE COULD HAVE NOTICED. OR THE OPPOSITE, BEING SO FIGETY OR RESTLESS THAT YOU HAVE BEEN MOVING AROUND A LOT MORE THAN USUAL: NOT AT ALL
6. FEELING BAD ABOUT YOURSELF - OR THAT YOU ARE A FAILURE OR HAVE LET YOURSELF OR YOUR FAMILY DOWN: NOT AL ALL
CLINICAL INTERPRETATION OF PHQ2 SCORE: 0
SUM OF ALL RESPONSES TO PHQ9 QUESTIONS 1 AND 2: 0
2. FEELING DOWN, DEPRESSED, IRRITABLE, OR HOPELESS: NOT AT ALL
7. TROUBLE CONCENTRATING ON THINGS, SUCH AS READING THE NEWSPAPER OR WATCHING TELEVISION: NOT AT ALL
SUM OF ALL RESPONSES TO PHQ QUESTIONS 1-9: 0
4. FEELING TIRED OR HAVING LITTLE ENERGY: NOT AT ALL
1. LITTLE INTEREST OR PLEASURE IN DOING THINGS: NOT AT ALL
3. TROUBLE FALLING OR STAYING ASLEEP OR SLEEPING TOO MUCH: NOT AT ALL
9. THOUGHTS THAT YOU WOULD BE BETTER OFF DEAD, OR OF HURTING YOURSELF: NOT AT ALL

## 2023-06-12 ASSESSMENT — FIBROSIS 4 INDEX: FIB4 SCORE: 0.79

## 2023-06-12 ASSESSMENT — ENCOUNTER SYMPTOMS: GENERAL WELL-BEING: EXCELLENT

## 2023-06-12 ASSESSMENT — ACTIVITIES OF DAILY LIVING (ADL): BATHING_REQUIRES_ASSISTANCE: 0

## 2023-06-12 NOTE — PROGRESS NOTES
Chief Complaint   Patient presents with    Annual Exam    Cough     X1  month    Requesting Labs     Needs new orders going to  before appt end of        HPI:  Roland is a 77 y.o. here for Medicare Annual Wellness Visit    Problem   Acute Cough    Try to make started about 3 to 4 weeks ago.  + allergies.  Feels like this is related to coming back in [place] with increase in pollen.  Coughing clear mucus  Its occurring frequently.  Tried an antihistamine such as Benadryl but did not provide relief of the symptoms.  Does follow with an allergist.     Prediabetes   Other Insomnia     continues to take trazodone 50 mg as needed, not nightly.     Atypical atrial flutter (HCC) seen 2019    Followed by Dr. Phil cuevas  Currently taking Xarelto 20 mg with evening meal.     Encounter for Annual Wellness Visit (Awv) in Medicare Patient   Essential Hypertension    Please follow with cardiology.  No longer taking his metoprolol 50 mg daily.  BP in clinic 108/62.  BP is well controlled at home, checked once weekly by his wife.     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  Denies any depressive symptoms.     Dyslipidemia    Currently taking simvastatin 20 mg every evening and Vascepa 1 g daily.  Continues to follow with cardiology.  Has not completed lipid profile in over a year.     Paf (Paroxysmal Atrial Fibrillation) ([facility])    Being followed by Dr. Smooth Cuevas with cardiology.  Recently taken off of metoprolol, blood pressure stable at that time.  Continues with Xarelto 20 mg every evening.          Patient Active Problem List    Diagnosis Date Noted    Acute cough 2023    Dermatitis 2022    Weight loss, unintentional 2022    Lung nodule 2022    Hypertriglyceridemia     Prediabetes 2021    Other insomnia 2021    Atypical atrial flutter (HCC) seen 2019     Chronic anticoagulation     History of tobacco use 2019     Encounter for annual wellness visit (AWV) in Medicare patient 11/27/2019    Hemorrhoids 01/13/2019    Tubular adenoma of colon 06/01/2018    Essential hypertension 04/19/2018    Hepatic steatosis 03/01/2018    Diverticulosis of large intestine without hemorrhage 02/28/2018    (HCC) Mild episode of recurrent major depressive disorder 02/28/2018    Dyslipidemia     Burgess's esophagus with dysplasia 10/15/2013    PAF (paroxysmal atrial fibrillation) (HCC) 10/12/2013       Current Outpatient Medications   Medication Sig Dispense Refill    DUPIXENT 300 MG/2ML injection       montelukast (SINGULAIR) 10 MG Tab Take 1 Tablet by mouth every day. 90 Tablet 3    azelastine (ASTELIN) 137 MCG/SPRAY nasal spray Administer 1 Spray into affected nostril(S) 2 times a day. 30 mL 5    simvastatin (ZOCOR) 40 MG Tab Take 0.5 Tablets by mouth every day. 45 Tablet 3    nitroglycerin (NITRO-BID) 2 % Ointment Apply to ravinder rectal area twice daily as needed for tenderness or swelling of minor hemorrhoids 15 g 2    NON SPECIFIED C-NITROGLYCERIN 0.125% OINT SIG: 15, Refills: 2. Indiciation for use: Tenderness or swelling of minor hemorrhoids.  Directions for use: Apply to ravinder-rectal area twice a day, as needed. 15 Each 2    PARoxetine (PAXIL) 10 MG Tab Take 1 Tablet by mouth every day. 90 Tablet 3    ketoconazole (NIZORAL) 2 % Cream Apply 1 Application topically 2 times a day. 30 g 0    traZODone (DESYREL) 50 MG Tab TAKE 1 TABLET BY MOUTH EVERY DAY AT BEDTIME AS NEEDED FOR SLEEP 90 Tablet 3    ibuprofen (MOTRIN) 800 MG Tab Take 1 Tablet by mouth every 8 hours as needed for Moderate Pain or Inflammation. 30 Tablet 0    diclofenac sodium (VOLTAREN) 1 % Gel Apply 2 g topically 4 times a day as needed. 100 g 0    rivaroxaban (XARELTO) 20 MG Tab tablet Take 1 Tablet by mouth with dinner. 90 Tablet 3    VASCEPA 1 g Cap TAKE 1 CAPSULE BY MOUTH TWICE A  Capsule 1     No current facility-administered medications for this visit.        Patient  is taking medications as noted in medication list.  Current supplements as per medication list.     Allergies: Amoxicillin    Current social contact/activities: Golf 2x a week, Walking     Is patient current with immunizations? Yes.    He  reports that he quit smoking about 11 years ago. His smoking use included cigarettes. He has a 30.00 pack-year smoking history. He has never used smokeless tobacco. He reports current alcohol use of about 2.4 oz of alcohol per week. He reports that he does not use drugs.  Counseling given: Not Answered      ROS:    Gait: Uses no assistive device   Ostomy: No   Other tubes: No   Amputations: No   Chronic oxygen use No   Last eye exam 2021   Wears hearing aids: No   : Denies any urinary leakage during the last 6 months    Screening:    Depression Screening  Little interest or pleasure in doing things?  0 - not at all  Feeling down, depressed, or hopeless? 0 - not at all  Patient Health Questionnaire Score: 0    If depressive symptoms identified deferred to follow up visit unless specifically addressed in assessment and plan.    Interpretation of PHQ-9 Total Score   Score Severity   1-4 No Depression   5-9 Mild Depression   10-14 Moderate Depression   15-19 Moderately Severe Depression   20-27 Severe Depression    Screening for Cognitive Impairment  Three Minute Recall (daughter, heaven, mountain)  2/3    Damaso clock face with all 12 numbers and set the hands to show 10 past 11.  Yes    If cognitive concerns identified, deferred for follow up unless specifically addressed in assessment and plan.    Fall Risk Assessment  Has the patient had two or more falls in the last year or any fall with injury in the last year?  No  If fall risk identified, deferred for follow up unless specifically addressed in assessment and plan.    Safety Assessment  Throw rugs on floor.  Yes, rubber backing  Handrails on all stairs.  Yes  Good lighting in all hallways.  Yes  Difficulty hearing.  No  Patient  counseled about all safety risks that were identified.    Functional Assessment ADLs  Are there any barriers preventing you from cooking for yourself or meeting nutritional needs?  No.    Are there any barriers preventing you from driving safely or obtaining transportation?  No.    Are there any barriers preventing you from using a telephone or calling for help?  No.    Are there any barriers preventing you from shopping?  No.    Are there any barriers preventing you from taking care of your own finances?  No.    Are there any barriers preventing you from managing your medications?  No.    Are there any barriers preventing you from showering, bathing or dressing yourself?  No.    Are you currently engaging in any exercise or physical activity?  Yes.     What is your perception of your health?  Excellent.    Advance Care Planning  Do you have an Advance Directive, Living Will, Durable Power of , or POLST? No               Health Maintenance Summary            Overdue - COVID-19 Vaccine (5 - Moderna series) Overdue since 8/22/2022 06/27/2022  Imm Admin: MODERNA SARS-COV-2 VACCINE (12+)    11/08/2021  Imm Admin: MODERNA SARS-COV-2 VACCINE (12+)    03/25/2021  Imm Admin: MODERNA SARS-COV-2 VACCINE (12+)    02/25/2021  Imm Admin: MODERNA SARS-COV-2 VACCINE (12+)              Annual Wellness Visit (Every 366 Days) Next due on 6/12/2024 06/12/2023  Subsequent Annual Wellness Visit - Includes PPPS ()    06/16/2022  Level of Service: [facility] INITIAL ANNUAL WELLNESS VISIT-INCLUDES PPPS    06/16/2022  Initial Annual Wellness Visit - Includes PPPS ()    07/29/2019  Visit Dx: Medicare annual wellness visit, subsequent    10/31/2018  Visit Dx: Medicare annual wellness visit, subsequent    Only the first 5 history entries have been loaded, but more history exists.              COLORECTAL CANCER SCREENING (COLONOSCOPY - Every 5 Years) Next due on 11/16/2027 11/16/2022  Surgical Procedure: [facility]  COLONOSCOPY,DIAGNOSTIC    05/26/2020  Surgical Procedure: PB COLONOSCOPY,DIAGNOSTIC    09/10/2018  Surgical Procedure: COLONOSCOPY    05/25/2018  REFERRAL TO GI FOR COLONOSCOPY              IMM DTaP/Tdap/Td Vaccine (2 - Td or Tdap) Next due on 11/9/2028 11/09/2018  Imm Admin: Tdap Vaccine              ABDOMINAL AORTIC ANEURYSM (AAA) SCREEN  Completed      02/28/2018  CT-RENAL COLIC EVALUATION(A/P W/O)              HEPATITIS C SCREENING  Completed      03/01/2018  HEPATITIS PANEL ACUTE(4 COMPONENTS)              IMM PNEUMOCOCCAL VACCINE: 65+ Years (Series Information) Completed      11/27/2019  Imm Admin: Pneumococcal polysaccharide vaccine (PPSV-23)    02/28/2018  Imm Admin: Pneumococcal Conjugate Vaccine (Prevnar/PCV-13)    09/27/2013  Imm Admin: Pneumococcal Vaccine (UF) - HISTORICAL DATA              IMM ZOSTER VACCINES (Series Information) Completed      05/24/2021  Imm Admin: Zoster Vaccine Recombinant (RZV) (SHINGRIX)    09/29/2020  Imm Admin: Zoster Vaccine Recombinant (RZV) (SHINGRIX)              IMM INFLUENZA (Series Information) Completed      09/19/2022  Imm Admin: Influenza Vaccine Adult HD    11/01/2021  Imm Admin: Influenza Vaccine Adult HD    10/22/2019  Imm Admin: Influenza Vaccine Adult HD    09/11/2018  Imm Admin: Influenza Vaccine Adult HD    11/07/2017  Imm Admin: Influenza Vaccine Pediatric Split - Historical Data    Only the first 5 history entries have been loaded, but more history exists.              IMM HEP B VACCINE (Series Information) Aged Out      No completion history exists for this topic.              HPV Vaccines (Series Information) Aged Out      No completion history exists for this topic.              IMM MENINGOCOCCAL ACWY VACCINE (Series Information) Aged Out      No completion history exists for this topic.                    Patient Care Team:  MICHAEL Freeman as PCP - General (Family Medicine)  Migel Cordero M.D. as Consulting Physician  (Gastroenterology)    Social History     Tobacco Use    Smoking status: Former     Packs/day: 0.60     Years: 50.00     Pack years: 30.00     Types: Cigarettes     Quit date: 10/1/2011     Years since quittin.7    Smokeless tobacco: Never   Vaping Use    Vaping Use: Never used   Substance Use Topics    Alcohol use: Yes     Alcohol/week: 2.4 oz     Types: 4 Standard drinks or equivalent per week     Comment: 4 drinks a week    Drug use: Never     Family History   Problem Relation Age of Onset    No Known Problems Mother     No Known Problems Father     No Known Problems Brother     No Known Problems Brother      He  has a past medical history of Arrhythmia (), Arthritis of left shoulder region (2018), Atypical atrial flutter (HCC) seen 2019, Chronic anticoagulation, H/O cocaine use (10/12/2013), H/O left inguinal hernia (3/26/2013), Heart burn, High cholesterol, Hypertension, Hypertriglyceridemia, Pneumonia (), Renal disorder (), and Right hydrocele (2018).   Past Surgical History:   Procedure Laterality Date    VT COLONOSCOPY,DIAGNOSTIC N/A 2022    Procedure: COLONOSCOPY;  Surgeon: Evelio Mackenzie M.D.;  Location: SURGERY [facility];  Service: Gastroenterology    VT COLONOSCOPY,DIAGNOSTIC  2020    Procedure: COLONOSCOPY - WITH POSSIBLE BIOPSY, DILATION, POLYPECTOMY, CONTROL OF HEMORRHAGE;  Surgeon: Migel Cordero M.D.;  Location: [facility];  Service: Gastroenterology    COLONOSCOPY N/A 9/10/2018    Procedure: COLONOSCOPY;  Surgeon: Migel Cordero M.D.;  Location: SURGERY [facility] DAY [facility];  Service: Gastroenterology    GASTROSCOPY-ENDO  10/13/2013    Performed by Oseas Madden M.D. at ENDOSCOPY [facility]    INGUINAL HERNIA REPAIR  3/26/2013    Performed by Felipe Carter M.D. at SURGERY SAME DAY [facility] ORS    OTHER      dialysis cath insertion and removal    COLONOSCOPY         Exam:   /62 (BP Location: Right arm, Patient  "Position: Sitting, BP Cuff Size: Adult)   Pulse 86   Temp 36.3 °C (97.4 °F) (Temporal)   Resp 16   Ht 1.778 m (5' 10\")   Wt 79 kg (174 lb 2.6 oz)   SpO2 97%  Body mass index is 24.99 kg/m².    Hearing excellent.    Dentition fair  Alert, oriented in no acute distress.  Eye contact is good, speech goal directed, affect calm    Physical Exam  Constitutional:       General: He is not in acute distress.  HENT:      Head: Normocephalic and atraumatic.      Right Ear: Tympanic membrane and external ear normal.      Left Ear: Tympanic membrane and external ear normal.      Nose: No nasal deformity.      Mouth/Throat:      Lips: Pink.      Mouth: Mucous membranes are moist.      Pharynx: Oropharynx is clear. Uvula midline. No posterior oropharyngeal erythema.   Eyes:      General: Lids are normal.      Extraocular Movements: Extraocular movements intact.      Conjunctiva/sclera: Conjunctivae normal.      Pupils: Pupils are equal, round, and reactive to light.   Neck:      Trachea: Trachea normal.   Cardiovascular:      Rate and Rhythm: Normal rate and regular rhythm.      Heart sounds: Normal heart sounds. No murmur heard.     No friction rub. No gallop.   Pulmonary:      Effort: Pulmonary effort is normal. No accessory muscle usage.      Breath sounds: Examination of the right-lower field reveals rhonchi. Examination of the left-lower field reveals rhonchi. Rhonchi (clears with cough) present. No wheezing or rales.   Abdominal:      General: Bowel sounds are normal.      Palpations: Abdomen is soft.      Tenderness: There is no abdominal tenderness.   Musculoskeletal:      Cervical back: Normal range of motion and neck supple.      Right lower leg: No edema.      Left lower leg: No edema.   Lymphadenopathy:      Cervical: No cervical adenopathy.   Skin:     General: Skin is warm and dry.      Findings: No rash.   Neurological:      General: No focal deficit present.      Mental Status: He is alert and oriented to " person, place, and time. Mental status is at baseline.      GCS: GCS eye subscore is 4. GCS verbal subscore is 5. GCS motor subscore is 6.      Motor: No weakness.      Gait: Gait is intact.   Psychiatric:         Attention and Perception: Attention normal.         Mood and Affect: Mood and affect normal.         Speech: Speech normal.           Assessment and Plan. The following treatment and monitoring plan is recommended:    Problem List Items Addressed This Visit       Dyslipidemia (Chronic)     Chronic, stable.  Continue simvastatin 20 mg every evening, Vascepa 1 g daily.  Check lipid profile.         Relevant Orders    Lipid Profile    Atypical atrial flutter (HCC) seen 12/2019 (Chronic)     Chronic, stable.  Continue Xarelto 20 mg every evening.  Continue to follow-up with cardiology and the recommendations.         PAF (paroxysmal atrial fibrillation) (HCC)     Chronic, stable.  Recommended to follow-up with cardiology.  Continue Xarelto 20 mg every evening.         (HCC) Mild episode of recurrent major depressive disorder     Chronic, stable.  Continue Paxil 10 mg daily.  Continue to monitor symptoms.         Essential hypertension     Chronic, stable.  Continue to follow with cardiology.  Continue to monitor BP at home.         Relevant Orders    CBC WITH DIFFERENTIAL    Encounter for annual wellness visit (AWV) in Medicare patient - Primary     Services suggested: No services needed at this time  Health Care Screening: Age-appropriate preventive services recommended by USPTF and ACIP covered by Medicare were discussed today. Services ordered if indicated and agreed upon by the patient.  Referrals offered: Community-based lifestyle interventions to reduce health risks and promote self-management and wellness, fall prevention, nutrition, physical activity, tobacco-use cessation, weight loss, and mental health services as per orders if indicated.    Discussion today about general wellness and lifestyle  habits:    Prevent falls and reduce trip hazards; Cautioned about securing or removing rugs.  Have a working fire alarm and carbon monoxide detector;   Engage in regular physical activity and social activities            Relevant Orders    Subsequent Annual Wellness Visit - Includes PPPS () (Completed)    Prediabetes     Chronic, stable.  We will check CMP, GFR, and A1c.         Relevant Orders    Comp Metabolic Panel    ESTIMATED GFR    HEMOGLOBIN A1C    Other insomnia     Chronic, stable.  Continue trazodone 50 mg as needed but not nightly.         Acute cough     Acute.  Physical exam revealed clear lung sounds with some rhonchi, cleared with coughing.  I do feel that this is more related to postnasal drip and increased mucus production secondary to allergies.  Recommended a daily antihistamine such as Zyrtec, Claritin, Allegra.  We will going get patient started on montelukast 10 mg daily.  I also prescribed him azelastine nasal spray to help with postnasal drip.  He is okay to also use Nasacort or Flonase.  Recommended follow-up with allergy.  I do not feel a chest x-ray is needed at this time I do not have concerns for pneumonia.         Relevant Medications    montelukast (SINGULAIR) 10 MG Tab     Other Visit Diagnoses       Seasonal allergies        Relevant Medications    azelastine (ASTELIN) 137 MCG/SPRAY nasal spray    Thyroid disorder screening        Relevant Orders    TSH+FREE T4             Follow-up: Return in about 4 weeks (around 7/10/2023), or if symptoms worsen or fail to improve, for labs.    Please note that this dictation was created using voice recognition software. I have made every reasonable attempt to correct obvious errors, but I expect that there are errors of grammar and possibly content that I did not discover before finalizing the note.

## 2023-06-12 NOTE — ASSESSMENT & PLAN NOTE
Chronic, stable.  Continue Xarelto 20 mg every evening.  Continue to follow-up with cardiology and the recommendations.

## 2023-06-12 NOTE — ASSESSMENT & PLAN NOTE
Acute.  Physical exam revealed clear lung sounds with some rhonchi, cleared with coughing.  I do feel that this is more related to postnasal drip and increased mucus production secondary to allergies.  Recommended a daily antihistamine such as Zyrtec, Claritin, Allegra.  We will going get patient started on montelukast 10 mg daily.  I also prescribed him azelastine nasal spray to help with postnasal drip.  He is okay to also use Nasacort or Flonase.  Recommended follow-up with allergy.  I do not feel a chest x-ray is needed at this time I do not have concerns for pneumonia.

## 2023-06-12 NOTE — ASSESSMENT & PLAN NOTE
Chronic, stable.  Continue simvastatin 20 mg every evening, Vascepa 1 g daily.  Check lipid profile.

## 2023-06-12 NOTE — ASSESSMENT & PLAN NOTE
Services suggested: No services needed at this time  Health Care Screening: Age-appropriate preventive services recommended by USPTF and ACIP covered by Medicare were discussed today. Services ordered if indicated and agreed upon by the patient.  Referrals offered: Community-based lifestyle interventions to reduce health risks and promote self-management and wellness, fall prevention, nutrition, physical activity, tobacco-use cessation, weight loss, and mental health services as per orders if indicated.    Discussion today about general wellness and lifestyle habits:    · Prevent falls and reduce trip hazards; Cautioned about securing or removing rugs.  · Have a working fire alarm and carbon monoxide detector;   · Engage in regular physical activity and social activities

## 2023-06-12 NOTE — ASSESSMENT & PLAN NOTE
Chronic, stable.  Recommended to follow-up with cardiology.  Continue Xarelto 20 mg every evening.

## 2023-06-12 NOTE — PATIENT INSTRUCTIONS
Start a daily antihistamine along with montelukast.  Trial flonase or nasocort to help with runny nose.  If no improvement follow with allergy

## 2023-06-27 ENCOUNTER — HOSPITAL ENCOUNTER (OUTPATIENT)
Dept: LAB | Facility: MEDICAL CENTER | Age: 78
End: 2023-06-27
Attending: FAMILY MEDICINE
Payer: MEDICARE

## 2023-06-27 DIAGNOSIS — I10 ESSENTIAL HYPERTENSION: ICD-10-CM

## 2023-06-27 DIAGNOSIS — R73.03 PREDIABETES: ICD-10-CM

## 2023-06-27 DIAGNOSIS — E78.5 DYSLIPIDEMIA: Chronic | ICD-10-CM

## 2023-06-27 LAB
ALBUMIN SERPL BCP-MCNC: 3.9 G/DL (ref 3.2–4.9)
ALBUMIN/GLOB SERPL: 1.4 G/DL
ALP SERPL-CCNC: 112 U/L (ref 30–99)
ALT SERPL-CCNC: 22 U/L (ref 2–50)
ANION GAP SERPL CALC-SCNC: 12 MMOL/L (ref 7–16)
AST SERPL-CCNC: 21 U/L (ref 12–45)
BASOPHILS # BLD AUTO: 1.3 % (ref 0–1.8)
BASOPHILS # BLD: 0.07 K/UL (ref 0–0.12)
BILIRUB SERPL-MCNC: 0.4 MG/DL (ref 0.1–1.5)
BUN SERPL-MCNC: 11 MG/DL (ref 8–22)
CALCIUM ALBUM COR SERPL-MCNC: 9.4 MG/DL (ref 8.5–10.5)
CALCIUM SERPL-MCNC: 9.3 MG/DL (ref 8.5–10.5)
CHLORIDE SERPL-SCNC: 104 MMOL/L (ref 96–112)
CHOLEST SERPL-MCNC: 152 MG/DL (ref 100–199)
CO2 SERPL-SCNC: 26 MMOL/L (ref 20–33)
CREAT SERPL-MCNC: 0.84 MG/DL (ref 0.5–1.4)
EOSINOPHIL # BLD AUTO: 0.1 K/UL (ref 0–0.51)
EOSINOPHIL NFR BLD: 1.9 % (ref 0–6.9)
ERYTHROCYTE [DISTWIDTH] IN BLOOD BY AUTOMATED COUNT: 44.4 FL (ref 35.9–50)
EST. AVERAGE GLUCOSE BLD GHB EST-MCNC: 114 MG/DL
FASTING STATUS PATIENT QL REPORTED: NORMAL
GFR SERPLBLD CREATININE-BSD FMLA CKD-EPI: 90 ML/MIN/1.73 M 2
GLOBULIN SER CALC-MCNC: 2.7 G/DL (ref 1.9–3.5)
GLUCOSE SERPL-MCNC: 95 MG/DL (ref 65–99)
HBA1C MFR BLD: 5.6 % (ref 4–5.6)
HCT VFR BLD AUTO: 43 % (ref 42–52)
HDLC SERPL-MCNC: 45 MG/DL
HGB BLD-MCNC: 13.5 G/DL (ref 14–18)
IMM GRANULOCYTES # BLD AUTO: 0.02 K/UL (ref 0–0.11)
IMM GRANULOCYTES NFR BLD AUTO: 0.4 % (ref 0–0.9)
LDLC SERPL CALC-MCNC: 76 MG/DL
LYMPHOCYTES # BLD AUTO: 1.35 K/UL (ref 1–4.8)
LYMPHOCYTES NFR BLD: 25.5 % (ref 22–41)
MCH RBC QN AUTO: 27.8 PG (ref 27–33)
MCHC RBC AUTO-ENTMCNC: 31.4 G/DL (ref 32.3–36.5)
MCV RBC AUTO: 88.7 FL (ref 81.4–97.8)
MONOCYTES # BLD AUTO: 0.63 K/UL (ref 0–0.85)
MONOCYTES NFR BLD AUTO: 11.9 % (ref 0–13.4)
NEUTROPHILS # BLD AUTO: 3.12 K/UL (ref 1.82–7.42)
NEUTROPHILS NFR BLD: 59 % (ref 44–72)
NRBC # BLD AUTO: 0 K/UL
NRBC BLD-RTO: 0 /100 WBC (ref 0–0.2)
PLATELET # BLD AUTO: 271 K/UL (ref 164–446)
PMV BLD AUTO: 9.5 FL (ref 9–12.9)
POTASSIUM SERPL-SCNC: 4 MMOL/L (ref 3.6–5.5)
PROT SERPL-MCNC: 6.6 G/DL (ref 6–8.2)
RBC # BLD AUTO: 4.85 M/UL (ref 4.7–6.1)
SODIUM SERPL-SCNC: 142 MMOL/L (ref 135–145)
T4 FREE SERPL-MCNC: 1.27 NG/DL (ref 0.93–1.7)
TRIGL SERPL-MCNC: 153 MG/DL (ref 0–149)
TSH SERPL DL<=0.005 MIU/L-ACNC: 2.86 UIU/ML (ref 0.38–5.33)
WBC # BLD AUTO: 5.3 K/UL (ref 4.8–10.8)

## 2023-06-27 PROCEDURE — 80053 COMPREHEN METABOLIC PANEL: CPT

## 2023-06-27 PROCEDURE — 83036 HEMOGLOBIN GLYCOSYLATED A1C: CPT | Mod: GA

## 2023-06-27 PROCEDURE — 85025 COMPLETE CBC W/AUTO DIFF WBC: CPT

## 2023-06-27 PROCEDURE — 84439 ASSAY OF FREE THYROXINE: CPT

## 2023-06-27 PROCEDURE — 36415 COLL VENOUS BLD VENIPUNCTURE: CPT

## 2023-06-27 PROCEDURE — 84443 ASSAY THYROID STIM HORMONE: CPT

## 2023-06-27 PROCEDURE — 80061 LIPID PANEL: CPT

## 2023-07-13 ENCOUNTER — OFFICE VISIT (OUTPATIENT)
Dept: MEDICAL GROUP | Facility: MEDICAL CENTER | Age: 78
End: 2023-07-13
Payer: MEDICARE

## 2023-07-13 VITALS
DIASTOLIC BLOOD PRESSURE: 60 MMHG | RESPIRATION RATE: 16 BRPM | SYSTOLIC BLOOD PRESSURE: 120 MMHG | BODY MASS INDEX: 24.3 KG/M2 | WEIGHT: 169.75 LBS | HEIGHT: 70 IN

## 2023-07-13 DIAGNOSIS — M54.50 CHRONIC MIDLINE LOW BACK PAIN WITHOUT SCIATICA: ICD-10-CM

## 2023-07-13 DIAGNOSIS — I70.0 ATHEROSCLEROSIS OF AORTA (HCC): ICD-10-CM

## 2023-07-13 DIAGNOSIS — E78.5 DYSLIPIDEMIA: Chronic | ICD-10-CM

## 2023-07-13 DIAGNOSIS — R73.03 PREDIABETES: ICD-10-CM

## 2023-07-13 DIAGNOSIS — G89.29 CHRONIC MIDLINE LOW BACK PAIN WITHOUT SCIATICA: ICD-10-CM

## 2023-07-13 DIAGNOSIS — I10 ESSENTIAL HYPERTENSION: ICD-10-CM

## 2023-07-13 DIAGNOSIS — Z13.29 SCREENING FOR THYROID DISORDER: ICD-10-CM

## 2023-07-13 DIAGNOSIS — D68.69 OTHER THROMBOPHILIA (HCC): ICD-10-CM

## 2023-07-13 PROCEDURE — 99214 OFFICE O/P EST MOD 30 MIN: CPT | Performed by: FAMILY MEDICINE

## 2023-07-13 PROCEDURE — 3074F SYST BP LT 130 MM HG: CPT | Performed by: FAMILY MEDICINE

## 2023-07-13 PROCEDURE — 3078F DIAST BP <80 MM HG: CPT | Performed by: FAMILY MEDICINE

## 2023-07-13 RX ORDER — METHOCARBAMOL 500 MG/1
500-1000 TABLET, FILM COATED ORAL 4 TIMES DAILY PRN
Qty: 30 TABLET | Refills: 5 | Status: SHIPPED | OUTPATIENT
Start: 2023-07-13

## 2023-07-13 ASSESSMENT — FIBROSIS 4 INDEX: FIB4 SCORE: 1.24

## 2023-07-13 NOTE — ASSESSMENT & PLAN NOTE
Chronic, stable.  Continue to follow with cardiology.  Continue to monitor blood pressure at home.

## 2023-07-13 NOTE — ASSESSMENT & PLAN NOTE
Chronic, Unstable.  Physical exam shows tenderness and muscle spasm to the right mid low back.  Recommended Robaxin 500 to 1000 mg as needed for muscle spasms.  Due to the high risk nature of this medication, informed the patient that this may cause drowsiness.  Avoid any alcoholic beverages, driving while on this medication.  Okay to use ibuprofen 600 mg as needed for back discomfort.  X-ray of his lumbar spine ordered, if no improvement in his symptoms, considered MRI and physical therapy.

## 2023-07-13 NOTE — ASSESSMENT & PLAN NOTE
Chronic, stable.  Continue simvastatin 20 mg every evening and Vascepa 1 g daily.  Continue to monitor diet make sure getting plenty of exercise.

## 2023-07-13 NOTE — PROGRESS NOTES
Papo Souza is a pleasant 77 y.o. male here for   Chief Complaint   Patient presents with    Follow-Up    Lab Results      HPI:   Problem   Chronic Midline Low Back Pain Without Sciatica    Reports chronic low back midline/left discomfort.  Frequently getting back spasms which makes it difficult for him to stay active.  He likes to golf but has not been able to do it this summer due to low back pain.  Has not had a back x-ray in some time.  Wondering if he can get an MRI.  Denies any sciatica, numbness, tingling.  He will do lots of stretches and finds that this has been helpful.  He started chiropractor in the past, worked temporarily but never longstanding relief.     Atherosclerosis of Aorta (Hcc)    Taking simvastatin 20 mg daily, denies any myalgias or difficulty with taking this medication.  No chest pain or shortness of breath.     Other Thrombophilia (Hcc)    Currently taking Xarelto 20 mg every evening.  Platelets are stable, following with cardiology.     Prediabetes    History of prediabetes, currently not taking any diabetic medications.  Sugars are controlled through diet and physical exercise.  Most recent A1c is under good control at 5.6.     Essential Hypertension    Please follow with cardiology.  No longer taking his metoprolol 50 mg daily.  BP in clinic 120/60  BP is well controlled at home, checked once weekly by his wife.     Dyslipidemia    Currently taking simvastatin 20 mg every evening and Vascepa 1 g daily.  Continues to follow with cardiology.     Latest Reference Range & Units 06/27/23 07:13   Cholesterol,Tot 100 - 199 mg/dL 152   Triglycerides 0 - 149 mg/dL 153 (H)   HDL >=40 mg/dL 45   LDL <100 mg/dL 76   (H): Data is abnormally high          Current Medicines (including changes today)  Current Outpatient Medications   Medication Sig Dispense Refill    methocarbamol (ROBAXIN) 500 MG Tab Take 1-2 Tablets by mouth 4 times a day as needed (low back spasm). 30 Tablet 5    DUPIXENT  300 MG/2ML injection       montelukast (SINGULAIR) 10 MG Tab Take 1 Tablet by mouth every day. 90 Tablet 3    azelastine (ASTELIN) 137 MCG/SPRAY nasal spray Administer 1 Spray into affected nostril(S) 2 times a day. 30 mL 5    simvastatin (ZOCOR) 40 MG Tab Take 0.5 Tablets by mouth every day. 45 Tablet 3    nitroglycerin (NITRO-BID) 2 % Ointment Apply to ravinder rectal area twice daily as needed for tenderness or swelling of minor hemorrhoids 15 g 2    NON SPECIFIED C-NITROGLYCERIN 0.125% OINT SIG: 15, Refills: 2. Indiciation for use: Tenderness or swelling of minor hemorrhoids.  Directions for use: Apply to ravinder-rectal area twice a day, as needed. 15 Each 2    PARoxetine (PAXIL) 10 MG Tab Take 1 Tablet by mouth every day. 90 Tablet 3    ketoconazole (NIZORAL) 2 % Cream Apply 1 Application topically 2 times a day. 30 g 0    traZODone (DESYREL) 50 MG Tab TAKE 1 TABLET BY MOUTH EVERY DAY AT BEDTIME AS NEEDED FOR SLEEP 90 Tablet 3    ibuprofen (MOTRIN) 800 MG Tab Take 1 Tablet by mouth every 8 hours as needed for Moderate Pain or Inflammation. 30 Tablet 0    diclofenac sodium (VOLTAREN) 1 % Gel Apply 2 g topically 4 times a day as needed. 100 g 0    rivaroxaban (XARELTO) 20 MG Tab tablet Take 1 Tablet by mouth with dinner. 90 Tablet 3    VASCEPA 1 g Cap TAKE 1 CAPSULE BY MOUTH TWICE A  Capsule 1     No current facility-administered medications for this visit.     Past Medical/ Surgical History  He  has a past medical history of Arrhythmia (2011), Arthritis of left shoulder region (9/14/2018), Atypical atrial flutter (HCC) seen 12/2019, Chronic anticoagulation, H/O cocaine use (10/12/2013), H/O left inguinal hernia (3/26/2013), Heart burn, High cholesterol, Hypertension, Hypertriglyceridemia, Pneumonia (2018), Renal disorder (2011), and Right hydrocele (11/30/2018).  He  has a past surgical history that includes other (2011); gastroscopy-endo (10/13/2013); colonoscopy; colonoscopy (N/A, 9/10/2018); inguinal hernia  "repair (3/26/2013); pr colonoscopy,diagnostic (5/26/2020); and pr colonoscopy,diagnostic (N/A, 11/16/2022).     Objective:     /60 (Patient Position: Sitting)   Resp 16   Ht 1.778 m (5' 10\")   Wt 77 kg (169 lb 12.1 oz)  Body mass index is 24.36 kg/m².    Physical exam was made by observation   Physical Exam  Constitutional:       General: He is not in acute distress.  HENT:      Head: Normocephalic and atraumatic.   Eyes:      Conjunctiva/sclera: Conjunctivae normal.      Pupils: Pupils are equal, round, and reactive to light.   Pulmonary:      Effort: Pulmonary effort is normal. No respiratory distress.   Abdominal:      General: There is no distension.   Musculoskeletal:      Cervical back: Normal range of motion and neck supple.   Skin:     General: Skin is warm and dry.      Findings: No rash.   Neurological:      Mental Status: He is alert and oriented to person, place, and time.      Gait: Gait is intact.   Psychiatric:         Mood and Affect: Affect normal.          Imaging:  No imaging    Labs  Recent labs from 06/27/2023 reviewed with the patient.    Assessment and Plan:   The following treatment plan was discussed  HCC Gap Form    Diagnosis to address: I70.0 - Atherosclerosis of aorta (HCC)  Assessment and plan: Chronic, stable. Continue with current defined treatment plan: Continue taking simvastatin 20 mg daily, watch diet.  Follow-up at least annually.  Diagnosis: D68.69 - Other thrombophilia (HCC)  Assessment and plan: Chronic, stable, as based on today's assessment and impact on other conditions evaluated today. Continue with current treatment plan: With Xarelto 20 mg every evening.  Follow-up with specialist as directed, but at least annually.  Last edited 07/13/23 09:00 PDT by MICHAEL Freeman        Problem List Items Addressed This Visit       Dyslipidemia (Chronic)     Chronic, stable.  Continue simvastatin 20 mg every evening and Vascepa 1 g daily.  Continue to monitor diet " make sure getting plenty of exercise.         Relevant Orders    Lipid Profile    Essential hypertension     Chronic, stable.  Continue to follow with cardiology.  Continue to monitor blood pressure at home.         Relevant Orders    CBC WITH DIFFERENTIAL    Prediabetes     Chronic, stable.  Continue with lifestyle modifications.         Relevant Orders    Comp Metabolic Panel    ESTIMATED GFR    Chronic midline low back pain without sciatica     Chronic, Unstable.  Physical exam shows tenderness and muscle spasm to the right mid low back.  Recommended Robaxin 500 to 1000 mg as needed for muscle spasms.  Due to the high risk nature of this medication, informed the patient that this may cause drowsiness.  Avoid any alcoholic beverages, driving while on this medication.  Okay to use ibuprofen 600 mg as needed for back discomfort.  X-ray of his lumbar spine ordered, if no improvement in his symptoms, considered MRI and physical therapy.         Relevant Medications    methocarbamol (ROBAXIN) 500 MG Tab    Other Relevant Orders    DX-LUMBAR SPINE-2 OR 3 VIEWS    Atherosclerosis of aorta (HCC)     Chronic, stable. Continue with current defined treatment plan: Continue taking simvastatin 20 mg daily, watch diet.  Follow-up at least annually.         Other thrombophilia (HCC)     Chronic, Stable.  Continue to take Xarelto 20 mg every evening.  Continue to follow with cardiology.          Other Visit Diagnoses       Screening for thyroid disorder        Relevant Orders    TSH+FREE T4             Followup: Return in about 1 year (around 7/13/2024) for annual and labs.    Please note that this dictation was created using voice recognition software. I have made every reasonable attempt to correct obvious errors, but I expect that there are errors of grammar and possibly content that I did not discover before finalizing the note.

## 2023-07-13 NOTE — ASSESSMENT & PLAN NOTE
Chronic, stable. Continue with current defined treatment plan: Continue taking simvastatin 20 mg daily, watch diet.  Follow-up at least annually.

## 2023-07-13 NOTE — ASSESSMENT & PLAN NOTE
Chronic, Stable.  Continue to take Xarelto 20 mg every evening.  Continue to follow with cardiology.

## 2023-07-26 ENCOUNTER — APPOINTMENT (OUTPATIENT)
Dept: RADIOLOGY | Facility: MEDICAL CENTER | Age: 78
End: 2023-07-26
Attending: FAMILY MEDICINE
Payer: MEDICARE

## 2023-07-26 DIAGNOSIS — G89.29 CHRONIC MIDLINE LOW BACK PAIN WITHOUT SCIATICA: ICD-10-CM

## 2023-07-26 DIAGNOSIS — M54.50 CHRONIC MIDLINE LOW BACK PAIN WITHOUT SCIATICA: ICD-10-CM

## 2023-07-26 PROCEDURE — 72100 X-RAY EXAM L-S SPINE 2/3 VWS: CPT

## 2023-09-01 DIAGNOSIS — E78.5 HYPERLIPIDEMIA, UNSPECIFIED HYPERLIPIDEMIA TYPE: ICD-10-CM

## 2023-09-01 RX ORDER — SIMVASTATIN 40 MG
20 TABLET ORAL
Qty: 45 TABLET | Refills: 3 | Status: SHIPPED | OUTPATIENT
Start: 2023-09-01 | End: 2024-01-29 | Stop reason: SDUPTHER

## 2023-11-02 ENCOUNTER — HOSPITAL ENCOUNTER (OUTPATIENT)
Dept: BEHAVIORAL HEALTH | Facility: MEDICAL CENTER | Age: 78
End: 2023-11-02
Attending: FAMILY MEDICINE
Payer: MEDICARE

## 2023-11-02 VITALS
RESPIRATION RATE: 16 BRPM | HEART RATE: 85 BPM | SYSTOLIC BLOOD PRESSURE: 128 MMHG | OXYGEN SATURATION: 98 % | DIASTOLIC BLOOD PRESSURE: 82 MMHG

## 2023-11-02 DIAGNOSIS — Z79.01 CHRONIC ANTICOAGULATION: Chronic | ICD-10-CM

## 2023-11-02 DIAGNOSIS — E78.2 MIXED HYPERLIPIDEMIA: ICD-10-CM

## 2023-11-02 DIAGNOSIS — F10.90 ALCOHOL USE DISORDER: ICD-10-CM

## 2023-11-02 PROCEDURE — 99204 OFFICE O/P NEW MOD 45 MIN: CPT | Performed by: FAMILY MEDICINE

## 2023-11-02 PROCEDURE — 99214 OFFICE O/P EST MOD 30 MIN: CPT | Performed by: FAMILY MEDICINE

## 2023-11-02 RX ORDER — NALTREXONE HYDROCHLORIDE 50 MG/1
50 TABLET, FILM COATED ORAL DAILY
Qty: 30 TABLET | Refills: 3 | Status: SHIPPED | OUTPATIENT
Start: 2023-11-02

## 2023-11-02 ASSESSMENT — ENCOUNTER SYMPTOMS
MYALGIAS: 0
BLURRED VISION: 0
RESPIRATORY NEGATIVE: 1
HEADACHES: 0
HEMOPTYSIS: 0
CARDIOVASCULAR NEGATIVE: 1
MUSCULOSKELETAL NEGATIVE: 1
CONSTITUTIONAL NEGATIVE: 1
GASTROINTESTINAL NEGATIVE: 1
TINGLING: 0
DEPRESSION: 0
DIZZINESS: 0
BRUISES/BLEEDS EASILY: 0
HEARTBURN: 0
PALPITATIONS: 0
NEUROLOGICAL NEGATIVE: 1
DOUBLE VISION: 0
EYES NEGATIVE: 1
NAUSEA: 0
FEVER: 0
COUGH: 0
CHILLS: 0

## 2023-11-02 ASSESSMENT — LIFESTYLE VARIABLES: SUBSTANCE_ABUSE: 1

## 2023-11-02 NOTE — PROGRESS NOTES
Psychiatric Progress Note               Author: Eben Bautista M.D. Date & Time created: 11/2/2023  9:27 AM     Interval History:  77 y/o male with pattern of drinking heavier over past few months. Would drink vodka up to 8 shots per day in the evening and then rant and rave on social media. Has now been clean for 7 days. Wife very supportive of sobriety. Some cravings still for etoh  No history of sz or dt's  Will try depade and f/u in 4 weeks for efficacy  Today the patient was counseled on avoidance of people, places and things that could be triggers for substance use  Encouraged on finding AA program that will work for him    Review of Systems:  Review of Systems   Constitutional: Negative.  Negative for chills and fever.   HENT: Negative.  Negative for hearing loss.    Eyes: Negative.  Negative for blurred vision and double vision.   Respiratory: Negative.  Negative for cough and hemoptysis.    Cardiovascular: Negative.  Negative for chest pain and palpitations.   Gastrointestinal: Negative.  Negative for heartburn and nausea.   Genitourinary: Negative.  Negative for dysuria.   Musculoskeletal: Negative.  Negative for myalgias.   Skin: Negative.  Negative for rash.   Neurological: Negative.  Negative for dizziness, tingling and headaches.   Endo/Heme/Allergies: Negative.  Does not bruise/bleed easily.   Psychiatric/Behavioral:  Positive for substance abuse. Negative for depression and suicidal ideas.    All other systems reviewed and are negative.      Physical Exam:  Physical Exam  Vitals and nursing note reviewed.   Constitutional:       General: He is not in acute distress.     Appearance: He is well-developed. He is not diaphoretic.   HENT:      Head: Normocephalic and atraumatic.      Mouth/Throat:      Pharynx: No oropharyngeal exudate.   Eyes:      Pupils: Pupils are equal, round, and reactive to light.   Cardiovascular:      Rate and Rhythm: Normal rate. Rhythm irregularly irregular.      Heart  sounds: Normal heart sounds. No murmur heard.     No friction rub. No gallop.   Pulmonary:      Effort: Pulmonary effort is normal. No respiratory distress.      Breath sounds: Normal breath sounds. No wheezing or rales.   Chest:      Chest wall: No tenderness.   Neurological:      Mental Status: He is alert and oriented to person, place, and time.   Psychiatric:         Behavior: Behavior normal.         Thought Content: Thought content normal.         Judgment: Judgment normal.         Labs:  No results found for this or any previous visit (from the past 24 hour(s)).    Hemodynamics:  No data recorded.     Pulse  Av  Min: 85  Max: 85  Blood Pressure : 128/82     Respiratory:    Respiration: 16, Pulse Oximetry: 98 %           Fluids:  No intake or output data in the 24 hours ending 23     GI/Nutrition:  No orders of the defined types were placed in this encounter.    Medications:  Current Outpatient Medications   Medication    simvastatin (ZOCOR) 40 MG Tab    naltrexone (DEPADE) 50 MG Tab    traZODone (DESYREL) 50 MG Tab    methocarbamol (ROBAXIN) 500 MG Tab    DUPIXENT 300 MG/2ML injection    montelukast (SINGULAIR) 10 MG Tab    azelastine (ASTELIN) 137 MCG/SPRAY nasal spray    nitroglycerin (NITRO-BID) 2 % Ointment    NON SPECIFIED    PARoxetine (PAXIL) 10 MG Tab    ketoconazole (NIZORAL) 2 % Cream    ibuprofen (MOTRIN) 800 MG Tab    diclofenac sodium (VOLTAREN) 1 % Gel    rivaroxaban (XARELTO) 20 MG Tab tablet    VASCEPA 1 g Cap     No current facility-administered medications for this encounter.     Medical Decision Making, by Problem:  There are no active hospital problems to display for this patient.    Plan:    1. Alcohol use disorder  naltrexone (DEPADE) 50 MG Tab      2. Chronic anticoagulation        3. Mixed hyperlipidemia

## 2023-11-20 DIAGNOSIS — F33.0 MILD EPISODE OF RECURRENT MAJOR DEPRESSIVE DISORDER (HCC): ICD-10-CM

## 2023-11-20 RX ORDER — PAROXETINE 10 MG/1
10 TABLET, FILM COATED ORAL
Qty: 90 TABLET | Refills: 3 | Status: SHIPPED | OUTPATIENT
Start: 2023-11-20

## 2023-11-29 ENCOUNTER — PATIENT MESSAGE (OUTPATIENT)
Dept: HEALTH INFORMATION MANAGEMENT | Facility: OTHER | Age: 78
End: 2023-11-29

## 2024-01-29 DIAGNOSIS — E78.5 HYPERLIPIDEMIA, UNSPECIFIED HYPERLIPIDEMIA TYPE: ICD-10-CM

## 2024-01-29 DIAGNOSIS — G47.09 OTHER INSOMNIA: ICD-10-CM

## 2024-01-29 RX ORDER — TRAZODONE HYDROCHLORIDE 50 MG/1
TABLET ORAL
Qty: 100 TABLET | Refills: 3 | Status: SHIPPED | OUTPATIENT
Start: 2024-01-29

## 2024-01-30 RX ORDER — SIMVASTATIN 40 MG
20 TABLET ORAL
Qty: 45 TABLET | Refills: 3 | Status: SHIPPED | OUTPATIENT
Start: 2024-01-30

## 2024-01-30 NOTE — TELEPHONE ENCOUNTER
Received request via: Pharmacy    Was the patient seen in the last year in this department? Yes    Does the patient have an active prescription (recently filled or refills available) for medication(s) requested? No    [facility]    Does the patient have longterm Plus and need 100 day supply (blood pressure, diabetes and cholesterol meds only)? Patient does not have SCP

## 2024-03-26 ENCOUNTER — HOSPITAL ENCOUNTER (INPATIENT)
Age: 79
LOS: 2 days | Discharge: HOME | DRG: 897 | End: 2024-03-28
Attending: EMERGENCY MEDICINE | Admitting: FAMILY MEDICINE

## 2024-03-26 ENCOUNTER — APPOINTMENT (OUTPATIENT)
Dept: CT IMAGING | Age: 79
DRG: 897 | End: 2024-03-26
Attending: EMERGENCY MEDICINE

## 2024-03-26 ENCOUNTER — APPOINTMENT (OUTPATIENT)
Dept: GENERAL RADIOLOGY | Age: 79
DRG: 897 | End: 2024-03-26
Attending: EMERGENCY MEDICINE

## 2024-03-26 DIAGNOSIS — F10.930 ALCOHOL WITHDRAWAL SYNDROME WITHOUT COMPLICATION (CMD): Primary | ICD-10-CM

## 2024-03-26 PROBLEM — F10.10 ALCOHOL ABUSE: Status: ACTIVE | Noted: 2024-03-26

## 2024-03-26 PROBLEM — E78.00 HIGH CHOLESTEROL: Status: ACTIVE | Noted: 2024-03-26

## 2024-03-26 PROBLEM — I48.91 AF (ATRIAL FIBRILLATION)  (CMD): Status: ACTIVE | Noted: 2024-03-26

## 2024-03-26 PROBLEM — I10 ESSENTIAL (PRIMARY) HYPERTENSION: Status: ACTIVE | Noted: 2024-03-26

## 2024-03-26 LAB
ABO + RH BLD: NORMAL
ALBUMIN SERPL-MCNC: 4.2 G/DL (ref 3.6–5.1)
ALBUMIN/GLOB SERPL: 1.2 {RATIO} (ref 1–2.4)
ALP SERPL-CCNC: 108 UNITS/L (ref 45–117)
ALT SERPL-CCNC: 17 UNITS/L
AMPHETAMINES UR QL SCN>500 NG/ML: NEGATIVE
ANION GAP SERPL CALC-SCNC: 21 MMOL/L (ref 7–19)
APTT PPP: 26 SEC (ref 22–32)
AST SERPL-CCNC: 15 UNITS/L
ATRIAL RATE (BPM): 127
BARBITURATES UR QL SCN>200 NG/ML: NEGATIVE
BASOPHILS # BLD: 0.1 K/MCL (ref 0–0.3)
BASOPHILS NFR BLD: 1 %
BENZODIAZ UR QL SCN>200 NG/ML: NEGATIVE
BILIRUB SERPL-MCNC: 0.3 MG/DL (ref 0.2–1)
BLD GP AB SCN SERPL QL GEL: NEGATIVE
BUN SERPL-MCNC: 11 MG/DL (ref 6–20)
BUN/CREAT SERPL: 12 (ref 7–25)
BZE UR QL SCN>150 NG/ML: NEGATIVE
CALCIUM SERPL-MCNC: 9.4 MG/DL (ref 8.4–10.2)
CANNABINOIDS UR QL SCN>50 NG/ML: NEGATIVE
CHLORIDE SERPL-SCNC: 110 MMOL/L (ref 97–110)
CO2 SERPL-SCNC: 13 MMOL/L (ref 21–32)
CREAT SERPL-MCNC: 0.91 MG/DL (ref 0.67–1.17)
DEPRECATED RDW RBC: 45.8 FL (ref 39–50)
EGFRCR SERPLBLD CKD-EPI 2021: 87 ML/MIN/{1.73_M2}
EOSINOPHIL # BLD: 0 K/MCL (ref 0–0.5)
EOSINOPHIL NFR BLD: 1 %
ERYTHROCYTE [DISTWIDTH] IN BLOOD: 14 % (ref 11–15)
ETHANOL SERPL-MCNC: NORMAL MG/DL
FASTING DURATION TIME PATIENT: ABNORMAL H
FENTANYL UR QL SCN: NEGATIVE
GLOBULIN SER-MCNC: 3.4 G/DL (ref 2–4)
GLUCOSE SERPL-MCNC: 126 MG/DL (ref 70–99)
HCT VFR BLD CALC: 43.5 % (ref 39–51)
HGB BLD-MCNC: 14 G/DL (ref 13–17)
IMM GRANULOCYTES # BLD AUTO: 0.1 K/MCL (ref 0–0.2)
IMM GRANULOCYTES # BLD: 1 %
INR PPP: 1
LACTATE BLDV-SCNC: 2 MMOL/L (ref 0–2)
LACTATE BLDV-SCNC: 9.8 MMOL/L (ref 0–2)
LYMPHOCYTES # BLD: 1.8 K/MCL (ref 1–4)
LYMPHOCYTES NFR BLD: 34 %
MAGNESIUM SERPL-MCNC: 2.3 MG/DL (ref 1.7–2.4)
MCH RBC QN AUTO: 28.7 PG (ref 26–34)
MCHC RBC AUTO-ENTMCNC: 32.2 G/DL (ref 32–36.5)
MCV RBC AUTO: 89.3 FL (ref 78–100)
MONOCYTES # BLD: 0.6 K/MCL (ref 0.3–0.9)
MONOCYTES NFR BLD: 11 %
NEUTROPHILS # BLD: 2.7 K/MCL (ref 1.8–7.7)
NEUTROPHILS NFR BLD: 52 %
NRBC BLD MANUAL-RTO: 0 /100 WBC
OPIATES UR QL SCN>300 NG/ML: NEGATIVE
PCP UR QL SCN>25 NG/ML: NEGATIVE
PLATELET # BLD AUTO: 286 K/MCL (ref 140–450)
POTASSIUM SERPL-SCNC: 3.4 MMOL/L (ref 3.4–5.1)
PROT SERPL-MCNC: 7.6 G/DL (ref 6.4–8.2)
PROTHROMBIN TIME: 10.9 SEC (ref 9.7–11.8)
QRS-INTERVAL (MSEC): 90
QT-INTERVAL (MSEC): 338
QTC: 495
R AXIS (DEGREES): 63
RAINBOW EXTRA TUBES HOLD SPECIMEN: NORMAL
RAINBOW EXTRA TUBES HOLD SPECIMEN: NORMAL
RBC # BLD: 4.87 MIL/MCL (ref 4.5–5.9)
REPORT TEXT: NORMAL
SODIUM SERPL-SCNC: 141 MMOL/L (ref 135–145)
T AXIS (DEGREES): 68
TYPE AND SCREEN EXPIRATION DATE: NORMAL
VENTRICULAR RATE EKG/MIN (BPM): 129
WBC # BLD: 5.2 K/MCL (ref 4.2–11)

## 2024-03-26 PROCEDURE — 83735 ASSAY OF MAGNESIUM: CPT | Performed by: EMERGENCY MEDICINE

## 2024-03-26 PROCEDURE — 10006031 HB ROOM CHARGE TELEMETRY

## 2024-03-26 PROCEDURE — 85730 THROMBOPLASTIN TIME PARTIAL: CPT | Performed by: EMERGENCY MEDICINE

## 2024-03-26 PROCEDURE — 36415 COLL VENOUS BLD VENIPUNCTURE: CPT | Performed by: INTERNAL MEDICINE

## 2024-03-26 PROCEDURE — 80307 DRUG TEST PRSMV CHEM ANLYZR: CPT | Performed by: INTERNAL MEDICINE

## 2024-03-26 PROCEDURE — 85610 PROTHROMBIN TIME: CPT | Performed by: EMERGENCY MEDICINE

## 2024-03-26 PROCEDURE — 85025 COMPLETE CBC W/AUTO DIFF WBC: CPT | Performed by: EMERGENCY MEDICINE

## 2024-03-26 PROCEDURE — 10002803 HB RX 637: Performed by: FAMILY MEDICINE

## 2024-03-26 PROCEDURE — 99285 EMERGENCY DEPT VISIT HI MDM: CPT

## 2024-03-26 PROCEDURE — 99223 1ST HOSP IP/OBS HIGH 75: CPT | Performed by: FAMILY MEDICINE

## 2024-03-26 PROCEDURE — 93005 ELECTROCARDIOGRAM TRACING: CPT | Performed by: EMERGENCY MEDICINE

## 2024-03-26 PROCEDURE — 99285 EMERGENCY DEPT VISIT HI MDM: CPT | Performed by: EMERGENCY MEDICINE

## 2024-03-26 PROCEDURE — 10004180 HB COUNTER-TRANSPORT

## 2024-03-26 PROCEDURE — HZ2ZZZZ DETOXIFICATION SERVICES FOR SUBSTANCE ABUSE TREATMENT: ICD-10-PCS | Performed by: FAMILY MEDICINE

## 2024-03-26 PROCEDURE — 10004651 HB RX, NO CHARGE ITEM: Performed by: FAMILY MEDICINE

## 2024-03-26 PROCEDURE — 71045 X-RAY EXAM CHEST 1 VIEW: CPT

## 2024-03-26 PROCEDURE — 70450 CT HEAD/BRAIN W/O DYE: CPT

## 2024-03-26 PROCEDURE — 80053 COMPREHEN METABOLIC PANEL: CPT | Performed by: EMERGENCY MEDICINE

## 2024-03-26 PROCEDURE — 86900 BLOOD TYPING SEROLOGIC ABO: CPT | Performed by: EMERGENCY MEDICINE

## 2024-03-26 PROCEDURE — 82077 ASSAY SPEC XCP UR&BREATH IA: CPT | Performed by: INTERNAL MEDICINE

## 2024-03-26 PROCEDURE — 83605 ASSAY OF LACTIC ACID: CPT | Performed by: EMERGENCY MEDICINE

## 2024-03-26 PROCEDURE — 10004651 HB RX, NO CHARGE ITEM

## 2024-03-26 PROCEDURE — 10002807 HB RX 258: Performed by: EMERGENCY MEDICINE

## 2024-03-26 PROCEDURE — 72125 CT NECK SPINE W/O DYE: CPT

## 2024-03-26 RX ORDER — ATORVASTATIN CALCIUM 20 MG/1
20 TABLET, FILM COATED ORAL NIGHTLY
Status: DISCONTINUED | OUTPATIENT
Start: 2024-03-26 | End: 2024-03-26

## 2024-03-26 RX ORDER — LORAZEPAM 2 MG/ML
2 INJECTION INTRAMUSCULAR
Status: DISCONTINUED | OUTPATIENT
Start: 2024-03-26 | End: 2024-03-28 | Stop reason: HOSPADM

## 2024-03-26 RX ORDER — TRAZODONE HYDROCHLORIDE 50 MG/1
50 TABLET ORAL
Status: DISCONTINUED | OUTPATIENT
Start: 2024-03-26 | End: 2024-03-28 | Stop reason: HOSPADM

## 2024-03-26 RX ORDER — SIMETHICONE 125 MG
125 TABLET,CHEWABLE ORAL 4 TIMES DAILY PRN
Status: DISCONTINUED | OUTPATIENT
Start: 2024-03-26 | End: 2024-03-28 | Stop reason: HOSPADM

## 2024-03-26 RX ORDER — TRAZODONE HYDROCHLORIDE 50 MG/1
1 TABLET ORAL
COMMUNITY
Start: 2024-01-29

## 2024-03-26 RX ORDER — PAROXETINE 10 MG/1
10 TABLET, FILM COATED ORAL DAILY
Status: DISCONTINUED | OUTPATIENT
Start: 2024-03-27 | End: 2024-03-28 | Stop reason: HOSPADM

## 2024-03-26 RX ORDER — HYDRALAZINE HYDROCHLORIDE 25 MG/1
25 TABLET, FILM COATED ORAL 3 TIMES DAILY PRN
Status: DISCONTINUED | OUTPATIENT
Start: 2024-03-26 | End: 2024-03-28 | Stop reason: HOSPADM

## 2024-03-26 RX ORDER — FOLIC ACID 1 MG/1
1 TABLET ORAL DAILY
Status: DISCONTINUED | OUTPATIENT
Start: 2024-03-26 | End: 2024-03-26 | Stop reason: SDUPTHER

## 2024-03-26 RX ORDER — LORAZEPAM 2 MG/1
2 TABLET ORAL
Status: DISCONTINUED | OUTPATIENT
Start: 2024-03-26 | End: 2024-03-28 | Stop reason: HOSPADM

## 2024-03-26 RX ORDER — FOLIC ACID 1 MG/1
1 TABLET ORAL DAILY
Status: DISCONTINUED | OUTPATIENT
Start: 2024-03-26 | End: 2024-03-28 | Stop reason: HOSPADM

## 2024-03-26 RX ORDER — LORAZEPAM 2 MG/ML
4 INJECTION INTRAMUSCULAR
Status: DISCONTINUED | OUTPATIENT
Start: 2024-03-26 | End: 2024-03-28 | Stop reason: HOSPADM

## 2024-03-26 RX ORDER — GABAPENTIN 100 MG/1
300 CAPSULE ORAL EVERY 8 HOURS SCHEDULED
Status: DISCONTINUED | OUTPATIENT
Start: 2024-03-26 | End: 2024-03-28 | Stop reason: HOSPADM

## 2024-03-26 RX ORDER — ACETAMINOPHEN 325 MG/1
650 TABLET ORAL EVERY 4 HOURS PRN
Status: DISCONTINUED | OUTPATIENT
Start: 2024-03-26 | End: 2024-03-28 | Stop reason: HOSPADM

## 2024-03-26 RX ORDER — SIMVASTATIN 40 MG
20 TABLET ORAL DAILY
COMMUNITY

## 2024-03-26 RX ORDER — LORAZEPAM 2 MG/ML
3 INJECTION INTRAMUSCULAR
Status: DISCONTINUED | OUTPATIENT
Start: 2024-03-26 | End: 2024-03-28 | Stop reason: HOSPADM

## 2024-03-26 RX ORDER — LORAZEPAM 2 MG/1
4 TABLET ORAL
Status: DISCONTINUED | OUTPATIENT
Start: 2024-03-26 | End: 2024-03-28 | Stop reason: HOSPADM

## 2024-03-26 RX ORDER — LOPERAMIDE HYDROCHLORIDE 2 MG/1
2 CAPSULE ORAL PRN
Status: DISCONTINUED | OUTPATIENT
Start: 2024-03-26 | End: 2024-03-28 | Stop reason: HOSPADM

## 2024-03-26 RX ORDER — ACETAMINOPHEN 500 MG
1000 TABLET ORAL ONCE
Status: COMPLETED | OUTPATIENT
Start: 2024-03-26 | End: 2024-03-26

## 2024-03-26 RX ORDER — ATORVASTATIN CALCIUM 10 MG/1
10 TABLET, FILM COATED ORAL NIGHTLY
Status: DISCONTINUED | OUTPATIENT
Start: 2024-03-27 | End: 2024-03-28 | Stop reason: HOSPADM

## 2024-03-26 RX ORDER — ACETAMINOPHEN 500 MG
TABLET ORAL
Status: COMPLETED
Start: 2024-03-26 | End: 2024-03-26

## 2024-03-26 RX ORDER — PAROXETINE 10 MG/1
10 TABLET, FILM COATED ORAL DAILY
COMMUNITY

## 2024-03-26 RX ORDER — GUAIFENESIN 200 MG/10ML
200 LIQUID ORAL EVERY 4 HOURS PRN
Status: DISCONTINUED | OUTPATIENT
Start: 2024-03-26 | End: 2024-03-28 | Stop reason: HOSPADM

## 2024-03-26 RX ADMIN — FOLIC ACID 1 MG: 1 TABLET ORAL at 21:36

## 2024-03-26 RX ADMIN — TRAZODONE HYDROCHLORIDE 50 MG: 50 TABLET ORAL at 22:17

## 2024-03-26 RX ADMIN — ACETAMINOPHEN 650 MG: 325 TABLET ORAL at 22:17

## 2024-03-26 RX ADMIN — GABAPENTIN 300 MG: 300 CAPSULE ORAL at 21:36

## 2024-03-26 RX ADMIN — RIVAROXABAN 20 MG: 10 TABLET, FILM COATED ORAL at 23:58

## 2024-03-26 RX ADMIN — Medication 1000 MG: at 12:52

## 2024-03-26 RX ADMIN — Medication 100 MG: at 21:36

## 2024-03-26 RX ADMIN — SODIUM CHLORIDE, POTASSIUM CHLORIDE, SODIUM LACTATE AND CALCIUM CHLORIDE 1000 ML: 600; 310; 30; 20 INJECTION, SOLUTION INTRAVENOUS at 13:24

## 2024-03-26 RX ADMIN — ACETAMINOPHEN 1000 MG: 500 TABLET ORAL at 12:52

## 2024-03-26 SDOH — SOCIAL STABILITY: SOCIAL NETWORK
HOW OFTEN DO YOU SEE OR TALK TO PEOPLE THAT YOU CARE ABOUT AND FEEL CLOSE TO? (FOR EXAMPLE: TALKING TO FRIENDS ON THE PHONE, VISITING FRIENDS OR FAMILY, GOING TO CHURCH OR CLUB MEETINGS): 5 OR MORE TIMES A WEEK

## 2024-03-26 SDOH — HEALTH STABILITY: PHYSICAL HEALTH: DO YOU HAVE DIFFICULTY DRESSING OR BATHING?: NO

## 2024-03-26 SDOH — SOCIAL STABILITY: SOCIAL INSECURITY: HOW OFTEN DOES ANYONE, INCLUDING FAMILY AND FRIENDS, SCREAM OR CURSE AT YOU?: NEVER

## 2024-03-26 SDOH — SOCIAL STABILITY: SOCIAL INSECURITY: HOW OFTEN DOES ANYONE, INCLUDING FAMILY AND FRIENDS, INSULT OR TALK DOWN TO YOU?: NEVER

## 2024-03-26 SDOH — ECONOMIC STABILITY: INCOME INSECURITY: IN THE PAST 12 MONTHS, HAS THE ELECTRIC, GAS, OIL, OR WATER COMPANY THREATENED TO SHUT OFF SERVICE IN YOUR HOME?: NO

## 2024-03-26 SDOH — SOCIAL STABILITY: SOCIAL INSECURITY: HOW OFTEN DOES ANYONE, INCLUDING FAMILY AND FRIENDS, THREATEN YOU WITH HARM?: NEVER

## 2024-03-26 SDOH — ECONOMIC STABILITY: FOOD INSECURITY: WITHIN THE PAST 12 MONTHS, THE FOOD YOU BOUGHT JUST DIDN'T LAST AND YOU DIDN'T HAVE MONEY TO GET MORE.: NEVER TRUE

## 2024-03-26 SDOH — HEALTH STABILITY: GENERAL: BECAUSE OF A PHYSICAL, MENTAL, OR EMOTIONAL CONDITION, DO YOU HAVE DIFFICULTY DOING ERRANDS ALONE?: NO

## 2024-03-26 SDOH — ECONOMIC STABILITY: HOUSING INSECURITY: WHAT IS YOUR LIVING SITUATION TODAY?: SPOUSE

## 2024-03-26 SDOH — ECONOMIC STABILITY: GENERAL

## 2024-03-26 SDOH — SOCIAL STABILITY: SOCIAL INSECURITY: HOW OFTEN DOES ANYONE, INCLUDING FAMILY AND FRIENDS, PHYSICALLY HURT YOU?: NEVER

## 2024-03-26 SDOH — HEALTH STABILITY: PHYSICAL HEALTH: DO YOU HAVE SERIOUS DIFFICULTY WALKING OR CLIMBING STAIRS?: NO

## 2024-03-26 SDOH — ECONOMIC STABILITY: HOUSING INSECURITY: DO YOU HAVE PROBLEMS WITH ANY OF THE FOLLOWING?: NONE OF THE ABOVE

## 2024-03-26 SDOH — HEALTH STABILITY: GENERAL
BECAUSE OF A PHYSICAL, MENTAL, OR EMOTIONAL CONDITION, DO YOU HAVE SERIOUS DIFFICULTY CONCENTRATING, REMEMBERING OR MAKING DECISIONS?: NO

## 2024-03-26 SDOH — ECONOMIC STABILITY: GENERAL: WOULD YOU LIKE HELP WITH ANY OF THE FOLLOWING NEEDS?: ALCOHOL/ADDICTION

## 2024-03-26 SDOH — SOCIAL STABILITY: SOCIAL NETWORK: SUPPORT SYSTEMS: CHURCH/FAITH COMMUNITY;FAMILY MEMBERS;FRIENDS;SPOUSE

## 2024-03-26 SDOH — ECONOMIC STABILITY: HOUSING INSECURITY: WHAT IS YOUR LIVING SITUATION TODAY?: I HAVE A STEADY PLACE TO LIVE

## 2024-03-26 SDOH — ECONOMIC STABILITY: HOUSING INSECURITY: WHAT IS YOUR LIVING SITUATION TODAY?: HOUSE

## 2024-03-26 ASSESSMENT — PATIENT HEALTH QUESTIONNAIRE - PHQ9
SUM OF ALL RESPONSES TO PHQ9 QUESTIONS 1 AND 2: 1
IS PATIENT ABLE TO COMPLETE PHQ2 OR PHQ9: YES
SUM OF ALL RESPONSES TO PHQ9 QUESTIONS 1 AND 2: 1
1. LITTLE INTEREST OR PLEASURE IN DOING THINGS: NOT AT ALL
CLINICAL INTERPRETATION OF PHQ2 SCORE: NO FURTHER SCREENING NEEDED
2. FEELING DOWN, DEPRESSED OR HOPELESS: SEVERAL DAYS

## 2024-03-26 ASSESSMENT — PAIN SCALES - GENERAL
PAINLEVEL_OUTOF10: 3
PAINLEVEL_OUTOF10: 0

## 2024-03-26 ASSESSMENT — LIFESTYLE VARIABLES
TREMOR: NO TREMOR
VISUAL DISTURBANCES: NOT PRESENT
ALCOHOL_USE_STATUS: UNHEALTHY DRINKING IDENTIFIED. AUDIT C: 3 OR MORE FOR WOMEN AND 4 OR MORE FOR MEN.
NAUSEA AND VOMITING: NO NAUSEA AND NO VOMITING
TREMOR: NO TREMOR
ANXIETY: MILDLY ANXIOUS
AUDIT-C TOTAL SCORE: 9
AUDITORY DISTURBANCES: NOT PRESENT
ANXIETY: NO ANXIETY, AT EASE
HOW OFTEN DO YOU HAVE 6 OR MORE DRINKS ON ONE OCCASION: WEEKLY
AUDIT-C TOTAL SCORE: 9
HOW OFTEN DO YOU HAVE A DRINK CONTAINING ALCOHOL: 2 TO 4 TIMES PER MONTH
NAUSEA AND VOMITING: NO NAUSEA AND NO VOMITING
HEADACHE, FULLNESS IN HEAD: NOT PRESENT
HOW OFTEN DO YOU HAVE 6 OR MORE DRINKS ON ONE OCCASION: WEEKLY
AGITATION: NORMAL ACTIVITY
HEADACHE, FULLNESS IN HEAD: MILD
HOW MANY STANDARD DRINKS CONTAINING ALCOHOL DO YOU HAVE ON A TYPICAL DAY: 10 OR MORE
PAROXYSMAL SWEATS: NO SWEAT VISIBLE
ALCOHOL_USE_STATUS: UNHEALTHY DRINKING IDENTIFIED. AUDIT C: 3 OR MORE FOR WOMEN AND 4 OR MORE FOR MEN.
AGITATION: NORMAL ACTIVITY
PAROXYSMAL SWEATS: NO SWEAT VISIBLE
HOW MANY STANDARD DRINKS CONTAINING ALCOHOL DO YOU HAVE ON A TYPICAL DAY: 10 OR MORE
HOW OFTEN DO YOU HAVE A DRINK CONTAINING ALCOHOL: 2 TO 4 TIMES PER MONTH
VISUAL DISTURBANCES: NOT PRESENT
AUDITORY DISTURBANCES: NOT PRESENT

## 2024-03-26 ASSESSMENT — ACTIVITIES OF DAILY LIVING (ADL)
ADL_SCORE: 12
ADL_SHORT_OF_BREATH: NO
ADL_SCORE: 12
RECENT_DECLINE_ADL: NO
ADL_SHORT_OF_BREATH: NO
ADL_BEFORE_ADMISSION: INDEPENDENT
RECENT_DECLINE_ADL: NO
ADL_BEFORE_ADMISSION: INDEPENDENT

## 2024-03-26 ASSESSMENT — ORIENTATION MEMORY CONCENTRATION TEST (OMCT)
COUNT BACKWARDS FROM 20 TO 1: CORRECT
WHAT MONTH IS IT NOW: CORRECT
OMCT INTERPRETATION: 0-6: NO SIGNIFICANT IMPAIRMENT
WHAT TIME IS IT (NO WATCH OR CLOCK): CORRECT
REPEAT THE NAME AND ADDRESS I ASKED YOU TO REMEMBER: CORRECT
OMCT SCORE: 0
WHAT YEAR IS IT NOW (MUST BE EXACT): CORRECT
SAY THE MONTHS IN REVERSE ORDER STARTING WITH LAST MONTH: CORRECT

## 2024-03-26 ASSESSMENT — COLUMBIA-SUICIDE SEVERITY RATING SCALE - C-SSRS
6. HAVE YOU EVER DONE ANYTHING, STARTED TO DO ANYTHING, OR PREPARED TO DO ANYTHING TO END YOUR LIFE?: NO
IS THE PATIENT ABLE TO COMPLETE C-SSRS: YES
2. HAVE YOU ACTUALLY HAD ANY THOUGHTS OF KILLING YOURSELF?: NO
1. WITHIN THE PAST MONTH, HAVE YOU WISHED YOU WERE DEAD OR WISHED YOU COULD GO TO SLEEP AND NOT WAKE UP?: NO

## 2024-03-27 LAB
ALBUMIN SERPL-MCNC: 3.3 G/DL (ref 3.6–5.1)
ALBUMIN/GLOB SERPL: 1.1 {RATIO} (ref 1–2.4)
ALP SERPL-CCNC: 105 UNITS/L (ref 45–117)
ALT SERPL-CCNC: 20 UNITS/L
ANION GAP SERPL CALC-SCNC: 10 MMOL/L (ref 7–19)
AST SERPL-CCNC: 43 UNITS/L
BASOPHILS # BLD: 0.1 K/MCL (ref 0–0.3)
BASOPHILS NFR BLD: 1 %
BILIRUB SERPL-MCNC: 1.2 MG/DL (ref 0.2–1)
BUN SERPL-MCNC: 12 MG/DL (ref 6–20)
BUN/CREAT SERPL: 12 (ref 7–25)
CALCIUM SERPL-MCNC: 8.8 MG/DL (ref 8.4–10.2)
CHLORIDE SERPL-SCNC: 111 MMOL/L (ref 97–110)
CO2 SERPL-SCNC: 22 MMOL/L (ref 21–32)
CREAT SERPL-MCNC: 0.97 MG/DL (ref 0.67–1.17)
DEPRECATED RDW RBC: 44.9 FL (ref 39–50)
EGFRCR SERPLBLD CKD-EPI 2021: 81 ML/MIN/{1.73_M2}
EOSINOPHIL # BLD: 0 K/MCL (ref 0–0.5)
EOSINOPHIL NFR BLD: 1 %
ERYTHROCYTE [DISTWIDTH] IN BLOOD: 14 % (ref 11–15)
FASTING DURATION TIME PATIENT: ABNORMAL H
GLOBULIN SER-MCNC: 3.1 G/DL (ref 2–4)
GLUCOSE SERPL-MCNC: 100 MG/DL (ref 70–99)
HCT VFR BLD CALC: 40.3 % (ref 39–51)
HGB BLD-MCNC: 13.3 G/DL (ref 13–17)
IMM GRANULOCYTES # BLD AUTO: 0 K/MCL (ref 0–0.2)
IMM GRANULOCYTES # BLD: 0 %
LYMPHOCYTES # BLD: 1.3 K/MCL (ref 1–4)
LYMPHOCYTES NFR BLD: 26 %
MAGNESIUM SERPL-MCNC: 2.3 MG/DL (ref 1.7–2.4)
MCH RBC QN AUTO: 29.1 PG (ref 26–34)
MCHC RBC AUTO-ENTMCNC: 33 G/DL (ref 32–36.5)
MCV RBC AUTO: 88.2 FL (ref 78–100)
MONOCYTES # BLD: 0.8 K/MCL (ref 0.3–0.9)
MONOCYTES NFR BLD: 16 %
NEUTROPHILS # BLD: 2.7 K/MCL (ref 1.8–7.7)
NEUTROPHILS NFR BLD: 56 %
NRBC BLD MANUAL-RTO: 0 /100 WBC
PLATELET # BLD AUTO: 225 K/MCL (ref 140–450)
POTASSIUM SERPL-SCNC: 3.5 MMOL/L (ref 3.4–5.1)
PROT SERPL-MCNC: 6.4 G/DL (ref 6.4–8.2)
RBC # BLD: 4.57 MIL/MCL (ref 4.5–5.9)
SODIUM SERPL-SCNC: 139 MMOL/L (ref 135–145)
WBC # BLD: 4.8 K/MCL (ref 4.2–11)

## 2024-03-27 PROCEDURE — 10006031 HB ROOM CHARGE TELEMETRY

## 2024-03-27 PROCEDURE — 36415 COLL VENOUS BLD VENIPUNCTURE: CPT | Performed by: FAMILY MEDICINE

## 2024-03-27 PROCEDURE — 83735 ASSAY OF MAGNESIUM: CPT | Performed by: FAMILY MEDICINE

## 2024-03-27 PROCEDURE — 10004651 HB RX, NO CHARGE ITEM: Performed by: FAMILY MEDICINE

## 2024-03-27 PROCEDURE — 85025 COMPLETE CBC W/AUTO DIFF WBC: CPT | Performed by: FAMILY MEDICINE

## 2024-03-27 PROCEDURE — 10002803 HB RX 637: Performed by: FAMILY MEDICINE

## 2024-03-27 PROCEDURE — X1094 NO CHARGE VISIT: HCPCS | Performed by: OPHTHALMOLOGY

## 2024-03-27 PROCEDURE — 10002803 HB RX 637

## 2024-03-27 PROCEDURE — 80053 COMPREHEN METABOLIC PANEL: CPT | Performed by: FAMILY MEDICINE

## 2024-03-27 PROCEDURE — 99233 SBSQ HOSP IP/OBS HIGH 50: CPT | Performed by: INTERNAL MEDICINE

## 2024-03-27 RX ADMIN — GABAPENTIN 300 MG: 300 CAPSULE ORAL at 06:05

## 2024-03-27 RX ADMIN — TRAZODONE HYDROCHLORIDE 50 MG: 50 TABLET ORAL at 21:37

## 2024-03-27 RX ADMIN — GABAPENTIN 300 MG: 300 CAPSULE ORAL at 13:21

## 2024-03-27 RX ADMIN — GABAPENTIN 300 MG: 300 CAPSULE ORAL at 21:36

## 2024-03-27 RX ADMIN — FOLIC ACID 1 MG: 1 TABLET ORAL at 09:25

## 2024-03-27 RX ADMIN — Medication 100 MG: at 09:25

## 2024-03-27 RX ADMIN — ACETAMINOPHEN 650 MG: 325 TABLET ORAL at 06:05

## 2024-03-27 RX ADMIN — ATORVASTATIN CALCIUM 10 MG: 10 TABLET, FILM COATED ORAL at 21:36

## 2024-03-27 RX ADMIN — PAROXETINE HYDROCHLORIDE HEMIHYDRATE 10 MG: 10 TABLET, FILM COATED ORAL at 09:25

## 2024-03-27 RX ADMIN — RIVAROXABAN 20 MG: 10 TABLET, FILM COATED ORAL at 17:09

## 2024-03-27 SDOH — ECONOMIC STABILITY: HOUSING INSECURITY: DO YOU HAVE PROBLEMS WITH ANY OF THE FOLLOWING?: NONE OF THE ABOVE

## 2024-03-27 ASSESSMENT — LIFESTYLE VARIABLES
AUDITORY DISTURBANCES: NOT PRESENT
VISUAL DISTURBANCES: NOT PRESENT
AGITATION: NORMAL ACTIVITY
ANXIETY: NO ANXIETY, AT EASE
AUDITORY DISTURBANCES: NOT PRESENT
NAUSEA AND VOMITING: NO NAUSEA AND NO VOMITING
HEADACHE, FULLNESS IN HEAD: NOT PRESENT
TREMOR: NO TREMOR
AGITATION: NORMAL ACTIVITY
TREMOR: NO TREMOR
PAROXYSMAL SWEATS: NO SWEAT VISIBLE
ANXIETY: NO ANXIETY, AT EASE
PAROXYSMAL SWEATS: NO SWEAT VISIBLE
VISUAL DISTURBANCES: NOT PRESENT
AGITATION: NORMAL ACTIVITY
NAUSEA AND VOMITING: NO NAUSEA AND NO VOMITING
ANXIETY: NO ANXIETY, AT EASE
HEADACHE, FULLNESS IN HEAD: NOT PRESENT
NAUSEA AND VOMITING: NO NAUSEA AND NO VOMITING
VISUAL DISTURBANCES: NOT PRESENT
HEADACHE, FULLNESS IN HEAD: NOT PRESENT
AUDITORY DISTURBANCES: NOT PRESENT
TREMOR: NO TREMOR
PAROXYSMAL SWEATS: NO SWEAT VISIBLE

## 2024-03-27 ASSESSMENT — COGNITIVE AND FUNCTIONAL STATUS - GENERAL
BECAUSE OF A PHYSICAL, MENTAL, OR EMOTIONAL CONDITION, DO YOU HAVE SERIOUS DIFFICULTY CONCENTRATING, REMEMBERING OR MAKING DECISIONS: NO
DO YOU HAVE SERIOUS DIFFICULTY WALKING OR CLIMBING STAIRS: NO

## 2024-03-27 ASSESSMENT — PAIN SCALES - GENERAL
PAINLEVEL_OUTOF10: 0
PAINLEVEL_OUTOF10: 2
PAINLEVEL_OUTOF10: 0

## 2024-03-28 VITALS
RESPIRATION RATE: 16 BRPM | HEART RATE: 61 BPM | DIASTOLIC BLOOD PRESSURE: 87 MMHG | BODY MASS INDEX: 25.05 KG/M2 | WEIGHT: 175 LBS | TEMPERATURE: 97.5 F | SYSTOLIC BLOOD PRESSURE: 116 MMHG | OXYGEN SATURATION: 96 % | HEIGHT: 70 IN

## 2024-03-28 PROCEDURE — 96372 THER/PROPH/DIAG INJ SC/IM: CPT | Performed by: INTERNAL MEDICINE

## 2024-03-28 PROCEDURE — 10002800 HB RX 250 W HCPCS: Performed by: INTERNAL MEDICINE

## 2024-03-28 PROCEDURE — 99239 HOSP IP/OBS DSCHRG MGMT >30: CPT | Performed by: INTERNAL MEDICINE

## 2024-03-28 PROCEDURE — 10002803 HB RX 637: Performed by: FAMILY MEDICINE

## 2024-03-28 RX ORDER — FOLIC ACID 1 MG/1
1 TABLET ORAL DAILY
Qty: 30 TABLET | Refills: 0 | Status: SHIPPED | OUTPATIENT
Start: 2024-03-29

## 2024-03-28 RX ORDER — LANOLIN ALCOHOL/MO/W.PET/CERES
100 CREAM (GRAM) TOPICAL DAILY
Qty: 30 TABLET | Refills: 0 | Status: SHIPPED | OUTPATIENT
Start: 2024-03-29

## 2024-03-28 RX ADMIN — PAROXETINE HYDROCHLORIDE HEMIHYDRATE 10 MG: 10 TABLET, FILM COATED ORAL at 08:30

## 2024-03-28 RX ADMIN — NALTREXONE 380 MG: KIT at 14:33

## 2024-03-28 RX ADMIN — GABAPENTIN 300 MG: 300 CAPSULE ORAL at 13:22

## 2024-03-28 RX ADMIN — Medication 100 MG: at 08:30

## 2024-03-28 RX ADMIN — FOLIC ACID 1 MG: 1 TABLET ORAL at 08:30

## 2024-03-28 RX ADMIN — GABAPENTIN 300 MG: 300 CAPSULE ORAL at 05:34

## 2024-03-28 ASSESSMENT — PAIN SCALES - GENERAL: PAINLEVEL_OUTOF10: 0

## 2024-03-30 ENCOUNTER — TELEPHONE (OUTPATIENT)
Dept: CARE COORDINATION | Age: 79
End: 2024-03-30

## 2024-04-06 ENCOUNTER — TELEPHONE (OUTPATIENT)
Dept: CARE COORDINATION | Age: 79
End: 2024-04-06

## 2024-04-13 ENCOUNTER — TELEPHONE (OUTPATIENT)
Dept: CARE COORDINATION | Age: 79
End: 2024-04-13

## 2024-04-20 ENCOUNTER — TELEPHONE (OUTPATIENT)
Dept: CARE COORDINATION | Age: 79
End: 2024-04-20

## 2024-04-27 ENCOUNTER — TELEPHONE (OUTPATIENT)
Dept: CARE COORDINATION | Age: 79
End: 2024-04-27

## 2024-05-08 ENCOUNTER — HOSPITAL ENCOUNTER (OUTPATIENT)
Dept: LAB | Facility: MEDICAL CENTER | Age: 79
End: 2024-05-08
Attending: FAMILY MEDICINE
Payer: MEDICARE

## 2024-05-08 DIAGNOSIS — E78.5 DYSLIPIDEMIA: Chronic | ICD-10-CM

## 2024-05-08 DIAGNOSIS — R73.03 PREDIABETES: ICD-10-CM

## 2024-05-08 DIAGNOSIS — I10 ESSENTIAL HYPERTENSION: ICD-10-CM

## 2024-05-08 LAB
ALBUMIN SERPL BCP-MCNC: 4.6 G/DL (ref 3.2–4.9)
ALBUMIN/GLOB SERPL: 1.8 G/DL
ALP SERPL-CCNC: 108 U/L (ref 30–99)
ALT SERPL-CCNC: 11 U/L (ref 2–50)
ANION GAP SERPL CALC-SCNC: 12 MMOL/L (ref 7–16)
AST SERPL-CCNC: 19 U/L (ref 12–45)
BASOPHILS # BLD AUTO: 1.2 % (ref 0–1.8)
BASOPHILS # BLD: 0.06 K/UL (ref 0–0.12)
BILIRUB SERPL-MCNC: 0.4 MG/DL (ref 0.1–1.5)
BUN SERPL-MCNC: 16 MG/DL (ref 8–22)
CALCIUM ALBUM COR SERPL-MCNC: 9.1 MG/DL (ref 8.5–10.5)
CALCIUM SERPL-MCNC: 9.6 MG/DL (ref 8.5–10.5)
CHLORIDE SERPL-SCNC: 106 MMOL/L (ref 96–112)
CHOLEST SERPL-MCNC: 200 MG/DL (ref 100–199)
CO2 SERPL-SCNC: 23 MMOL/L (ref 20–33)
CREAT SERPL-MCNC: 0.95 MG/DL (ref 0.5–1.4)
EOSINOPHIL # BLD AUTO: 0.13 K/UL (ref 0–0.51)
EOSINOPHIL NFR BLD: 2.7 % (ref 0–6.9)
ERYTHROCYTE [DISTWIDTH] IN BLOOD BY AUTOMATED COUNT: 45.3 FL (ref 35.9–50)
FASTING STATUS PATIENT QL REPORTED: NORMAL
GFR SERPLBLD CREATININE-BSD FMLA CKD-EPI: 83 ML/MIN/1.73 M 2
GLOBULIN SER CALC-MCNC: 2.5 G/DL (ref 1.9–3.5)
GLUCOSE SERPL-MCNC: 100 MG/DL (ref 65–99)
HCT VFR BLD AUTO: 46.6 % (ref 42–52)
HDLC SERPL-MCNC: 44 MG/DL
HGB BLD-MCNC: 14.9 G/DL (ref 14–18)
IMM GRANULOCYTES # BLD AUTO: 0.01 K/UL (ref 0–0.11)
IMM GRANULOCYTES NFR BLD AUTO: 0.2 % (ref 0–0.9)
LDLC SERPL CALC-MCNC: 123 MG/DL
LYMPHOCYTES # BLD AUTO: 1.25 K/UL (ref 1–4.8)
LYMPHOCYTES NFR BLD: 25.9 % (ref 22–41)
MCH RBC QN AUTO: 28.8 PG (ref 27–33)
MCHC RBC AUTO-ENTMCNC: 32 G/DL (ref 32.3–36.5)
MCV RBC AUTO: 90.1 FL (ref 81.4–97.8)
MONOCYTES # BLD AUTO: 0.63 K/UL (ref 0–0.85)
MONOCYTES NFR BLD AUTO: 13 % (ref 0–13.4)
NEUTROPHILS # BLD AUTO: 2.75 K/UL (ref 1.82–7.42)
NEUTROPHILS NFR BLD: 57 % (ref 44–72)
NRBC # BLD AUTO: 0 K/UL
NRBC BLD-RTO: 0 /100 WBC (ref 0–0.2)
PLATELET # BLD AUTO: 254 K/UL (ref 164–446)
PMV BLD AUTO: 9.3 FL (ref 9–12.9)
POTASSIUM SERPL-SCNC: 4.4 MMOL/L (ref 3.6–5.5)
PROT SERPL-MCNC: 7.1 G/DL (ref 6–8.2)
RBC # BLD AUTO: 5.17 M/UL (ref 4.7–6.1)
SODIUM SERPL-SCNC: 141 MMOL/L (ref 135–145)
T4 FREE SERPL-MCNC: 0.97 NG/DL (ref 0.93–1.7)
TRIGL SERPL-MCNC: 164 MG/DL (ref 0–149)
TSH SERPL DL<=0.005 MIU/L-ACNC: 3.7 UIU/ML (ref 0.38–5.33)
WBC # BLD AUTO: 4.8 K/UL (ref 4.8–10.8)

## 2024-05-30 ENCOUNTER — HOSPITAL ENCOUNTER (OUTPATIENT)
Dept: RADIOLOGY | Facility: MEDICAL CENTER | Age: 79
End: 2024-05-30
Attending: FAMILY MEDICINE
Payer: MEDICARE

## 2024-05-30 ENCOUNTER — OFFICE VISIT (OUTPATIENT)
Dept: MEDICAL GROUP | Facility: MEDICAL CENTER | Age: 79
End: 2024-05-30
Payer: MEDICARE

## 2024-05-30 VITALS
SYSTOLIC BLOOD PRESSURE: 122 MMHG | DIASTOLIC BLOOD PRESSURE: 74 MMHG | TEMPERATURE: 98.4 F | WEIGHT: 176 LBS | BODY MASS INDEX: 25.2 KG/M2 | HEART RATE: 77 BPM | OXYGEN SATURATION: 97 % | HEIGHT: 70 IN

## 2024-05-30 DIAGNOSIS — G89.29 CHRONIC PAIN OF RIGHT ELBOW: ICD-10-CM

## 2024-05-30 DIAGNOSIS — E78.1 HYPERTRIGLYCERIDEMIA: Chronic | ICD-10-CM

## 2024-05-30 DIAGNOSIS — E78.5 HYPERLIPIDEMIA, UNSPECIFIED HYPERLIPIDEMIA TYPE: ICD-10-CM

## 2024-05-30 DIAGNOSIS — M25.521 CHRONIC PAIN OF RIGHT ELBOW: ICD-10-CM

## 2024-05-30 DIAGNOSIS — E78.5 DYSLIPIDEMIA: Chronic | ICD-10-CM

## 2024-05-30 DIAGNOSIS — F43.21 GRIEF: ICD-10-CM

## 2024-05-30 RX ORDER — SIMVASTATIN 40 MG
40 TABLET ORAL
Qty: 90 TABLET | Refills: 3 | Status: SHIPPED | OUTPATIENT
Start: 2024-05-30

## 2024-05-30 RX ORDER — FOLIC ACID 1 MG/1
1 TABLET ORAL DAILY
COMMUNITY
Start: 2024-03-29

## 2024-05-30 RX ORDER — LANOLIN ALCOHOL/MO/W.PET/CERES
100 CREAM (GRAM) TOPICAL DAILY
COMMUNITY
Start: 2024-03-29

## 2024-05-30 ASSESSMENT — PATIENT HEALTH QUESTIONNAIRE - PHQ9: CLINICAL INTERPRETATION OF PHQ2 SCORE: 0

## 2024-05-30 ASSESSMENT — FIBROSIS 4 INDEX: FIB4 SCORE: 1.71

## 2024-05-30 NOTE — PROGRESS NOTES
Verbal consent was acquired by the patient to use [employer] ambient listening note generation during this visit:  Yes      Chief complaint::Diagnoses of Dyslipidemia, Hypertriglyceridemia, Grief, Chronic pain of right elbow, and Hyperlipidemia, unspecified hyperlipidemia type were pertinent to this visit.    Assessment and Plan:   The following treatment plan was discussed:     Assessment & Plan  1.  Grief  Chronic, unstable.  A referral to psychology services for grief counseling has been initiated.    2. Mixed hyperlipidemia with hypertriglyceridemia.  The condition is a chronic and stable condition. The dosage of simvastatin has been increased to 40 mg daily, with a daily intake of 1 tablet in conjunction with Vascepa 1 g. Dietary modifications were discussed in reducing cholesterol.    3. Chronic right elbow pain.  A referral has been made to sports medicine, along with a right elbow x-ray. The patient is advised to continue NSAIDs to alleviate discomfort.    Follow-up  A follow-up appointment is scheduled for 07/2024 for a repeat lipid profile.  Roland was seen today for lab results.    Diagnoses and all orders for this visit:    Dyslipidemia  -     Lipid Profile; Future    Hypertriglyceridemia  -     Lipid Profile; Future    Grief  -     Referral to Psychology    Chronic pain of right elbow  -     DX-ELBOW-COMPLETE 3+ RIGHT; Future  -     Referral to Sports Medicine    Hyperlipidemia, unspecified hyperlipidemia type  -     simvastatin (ZOCOR) 40 MG Tab; Take 1 Tablet by mouth every day.        Followup: Return if symptoms worsen or fail to improve.    Subjective/HPI:   HPI:    Papo Souza is a pleasant 78 y.o. male here for   Chief Complaint   Patient presents with    Lab Results        History of Present Illness  The patient is a 78-year-old male who is here to review his labs.    The patient's medication regimen includes thiamine, folate, Xarelto, diclofenac gel, ketoconazole cream as needed,  Robaxin as needed, Singulair as needed, naltrexone, nitroglycerin ointment as needed, Paxil 10 mg, Xarelto 20 mg, simvastatin 20 mg (half a tablet), trazodone 50 mg, and Vicep.    The patient's son committed suicide in 01/2024, leading to a suboptimal diet and alcohol consumption. He has reduced his alcohol consumption with naltrexone, which he finds beneficial. He has previously attended [facility] in [place].    The patient has been experiencing right elbow pain for several months, which he attributes to an injury sustained while playing golf.    Current Medicines (including changes today)  Current Outpatient Medications   Medication Sig Dispense Refill    thiamine (THIAMINE) 100 MG tablet Take 100 mg by mouth every day.      folic acid (FOLVITE) 1 MG Tab Take 1 mg by mouth every day.      simvastatin (ZOCOR) 40 MG Tab Take 1 Tablet by mouth every day. 90 Tablet 3    traZODone (DESYREL) 50 MG Tab TAKE 1 TABLET BY MOUTH AT BEDTIME AS NEEDED FOR SLEEP 100 Tablet 3    PARoxetine (PAXIL) 10 MG Tab TAKE 1 TABLET BY MOUTH EVERY DAY 90 Tablet 3    naltrexone (DEPADE) 50 MG Tab Take 1 Tablet by mouth every day. 30 Tablet 3    methocarbamol (ROBAXIN) 500 MG Tab Take 1-2 Tablets by mouth 4 times a day as needed (low back spasm). 30 Tablet 5    montelukast (SINGULAIR) 10 MG Tab Take 1 Tablet by mouth every day. 90 Tablet 3    nitroglycerin (NITRO-BID) 2 % Ointment Apply to ravinder rectal area twice daily as needed for tenderness or swelling of minor hemorrhoids 15 g 2    NON SPECIFIED C-NITROGLYCERIN 0.125% OINT SIG: 15, Refills: 2. Indiciation for use: Tenderness or swelling of minor hemorrhoids.  Directions for use: Apply to ravinder-rectal area twice a day, as needed. 15 Each 2    ketoconazole (NIZORAL) 2 % Cream Apply 1 Application topically 2 times a day. 30 g 0    diclofenac sodium (VOLTAREN) 1 % Gel Apply 2 g topically 4 times a day as needed. 100 g 0    rivaroxaban (XARELTO) 20 MG Tab tablet Take 1 Tablet by mouth  "with dinner. 90 Tablet 3    VASCEPA 1 g Cap TAKE 1 CAPSULE BY MOUTH TWICE A  Capsule 1     No current facility-administered medications for this visit.     Past Medical/ Surgical History  He  has a past medical history of Arrhythmia (2011), Arthritis of left shoulder region (9/14/2018), Atypical atrial flutter (HCC) seen 12/2019, Chronic anticoagulation, H/O cocaine use (10/12/2013), H/O left inguinal hernia (3/26/2013), Heart burn, High cholesterol, Hypertension, Hypertriglyceridemia, Pneumonia (2018), Renal disorder (2011), and Right hydrocele (11/30/2018).  He  has a past surgical history that includes other (2011); gastroscopy-endo (10/13/2013); colonoscopy; colonoscopy (N/A, 9/10/2018); inguinal hernia repair (3/26/2013); pr colonoscopy,diagnostic (5/26/2020); and pr colonoscopy,diagnostic (N/A, 11/16/2022).       Objective:   /74   Pulse 77   Temp 36.9 °C (98.4 °F)   Ht 1.778 m (5' 10\")   Wt 79.8 kg (176 lb)   SpO2 97%  Body mass index is 25.25 kg/m².    Physical exam was made by observation   Physical Exam  Constitutional:       General: He is not in acute distress.  HENT:      Head: Normocephalic and atraumatic.   Eyes:      Conjunctiva/sclera: Conjunctivae normal.      Pupils: Pupils are equal, round, and reactive to light.   Pulmonary:      Effort: Pulmonary effort is normal. No respiratory distress.   Abdominal:      General: There is no distension.   Musculoskeletal:      Cervical back: Normal range of motion and neck supple.   Skin:     General: Skin is warm and dry.      Findings: No rash.   Neurological:      Mental Status: He is alert and oriented to person, place, and time.      Gait: Gait is intact.   Psychiatric:         Mood and Affect: Affect normal.          Lab/ Imaging Results:  Results  Laboratory Studies  Sodium, potassium, chloride are normal. Glucose is 100. BUN and creatinine are normal. Calcium levels are normal. Liver enzymes are good. Alkaline phosphatase is 108. GFR " is 83. Total cholesterol is 200. Triglycerides are 164. HDL is in the normal range. LDL is 123.    Please note that this dictation was created using voice recognition software. I have made every reasonable attempt to correct obvious errors, but I expect that there are errors of grammar and possibly content that I did not discover before finalizing the note.

## 2024-06-25 ENCOUNTER — OFFICE VISIT (OUTPATIENT)
Dept: MEDICAL GROUP | Facility: MEDICAL CENTER | Age: 79
End: 2024-06-25
Payer: MEDICARE

## 2024-06-25 VITALS
DIASTOLIC BLOOD PRESSURE: 72 MMHG | HEART RATE: 85 BPM | HEIGHT: 70 IN | TEMPERATURE: 97.6 F | SYSTOLIC BLOOD PRESSURE: 130 MMHG | WEIGHT: 176 LBS | BODY MASS INDEX: 25.2 KG/M2 | OXYGEN SATURATION: 97 %

## 2024-06-25 DIAGNOSIS — G89.29 CHRONIC ELBOW PAIN, RIGHT: ICD-10-CM

## 2024-06-25 DIAGNOSIS — F10.10 ALCOHOL ABUSE: ICD-10-CM

## 2024-06-25 DIAGNOSIS — M25.521 CHRONIC ELBOW PAIN, RIGHT: ICD-10-CM

## 2024-06-25 PROCEDURE — 3075F SYST BP GE 130 - 139MM HG: CPT | Performed by: FAMILY MEDICINE

## 2024-06-25 PROCEDURE — 99214 OFFICE O/P EST MOD 30 MIN: CPT | Performed by: FAMILY MEDICINE

## 2024-06-25 PROCEDURE — 3078F DIAST BP <80 MM HG: CPT | Performed by: FAMILY MEDICINE

## 2024-06-25 RX ORDER — NALTREXONE HYDROCHLORIDE 50 MG/1
TABLET, FILM COATED ORAL
Qty: 63 TABLET | Refills: 0 | Status: SHIPPED | OUTPATIENT
Start: 2024-06-25 | End: 2024-07-30

## 2024-06-25 ASSESSMENT — FIBROSIS 4 INDEX: FIB4 SCORE: 1.71

## 2024-06-25 NOTE — PROGRESS NOTES
Verbal consent was acquired by the patient to use [employer] ambient listening note generation during this visit:  Yes      Chief complaint::Diagnoses of Alcohol abuse and Chronic elbow pain, right were pertinent to this visit.    Assessment and Plan:   The following treatment plan was discussed:     Assessment & Plan  1. Acute alcohol disorder.  This is a chronic, unstable condition. A 7-day refill of naltrexone 50 mg has been sent to his preferred pharmacy. The dosage will be increased to 100 mg daily.    2.  Right elbow pain, chronic and stable  Referral to sports medicine placed for the patient as he would like to move forward with another steroid joint injection.    Follow-up  The patient is scheduled for a follow-up in 07/2024 for his annual wellness visit.  Roland was seen today for immunizations.    Diagnoses and all orders for this visit:    Alcohol abuse  -     naltrexone (DEPADE) 50 MG Tab; Take 1 Tablet by mouth every day for 7 days, THEN 2 Tablets every day for 28 days.    Chronic elbow pain, right  -     Referral to Sports Medicine        Followup: Return in about 3 weeks (around 7/16/2024), or if symptoms worsen or fail to improve, for Annual wellness visit.    Subjective/HPI:     HPI:    Papo Souza is a pleasant 78 y.o. male here for   Chief Complaint   Patient presents with    Immunizations        History of Present Illness  The patient is a 78-year-old male who is here to discuss alcohol abuse.    The patient has been without naltrexone for approximately 10 days due to misplacing its supply. He is seeking a higher dosage, which he reports has been effective in managing his cravings. He expresses a desire for an injectable medication.    The patient expresses a desire for a cortisone injection in his right elbow today, having previously received a cortisone injection for his shoulder several years ago. He is currently under the care of Bart Goldstein at [facility], who has  recommended dry needles.    Current Medicines (including changes today)  Current Outpatient Medications   Medication Sig Dispense Refill    naltrexone (DEPADE) 50 MG Tab Take 1 Tablet by mouth every day for 7 days, THEN 2 Tablets every day for 28 days. 63 Tablet 0    thiamine (THIAMINE) 100 MG tablet Take 100 mg by mouth every day.      folic acid (FOLVITE) 1 MG Tab Take 1 mg by mouth every day.      simvastatin (ZOCOR) 40 MG Tab Take 1 Tablet by mouth every day. 90 Tablet 3    traZODone (DESYREL) 50 MG Tab TAKE 1 TABLET BY MOUTH AT BEDTIME AS NEEDED FOR SLEEP 100 Tablet 3    PARoxetine (PAXIL) 10 MG Tab TAKE 1 TABLET BY MOUTH EVERY DAY 90 Tablet 3    methocarbamol (ROBAXIN) 500 MG Tab Take 1-2 Tablets by mouth 4 times a day as needed (low back spasm). 30 Tablet 5    montelukast (SINGULAIR) 10 MG Tab Take 1 Tablet by mouth every day. 90 Tablet 3    nitroglycerin (NITRO-BID) 2 % Ointment Apply to ravinder rectal area twice daily as needed for tenderness or swelling of minor hemorrhoids 15 g 2    NON SPECIFIED C-NITROGLYCERIN 0.125% OINT SIG: 15, Refills: 2. Indiciation for use: Tenderness or swelling of minor hemorrhoids.  Directions for use: Apply to ravinder-rectal area twice a day, as needed. 15 Each 2    ketoconazole (NIZORAL) 2 % Cream Apply 1 Application topically 2 times a day. 30 g 0    diclofenac sodium (VOLTAREN) 1 % Gel Apply 2 g topically 4 times a day as needed. 100 g 0    rivaroxaban (XARELTO) 20 MG Tab tablet Take 1 Tablet by mouth with dinner. 90 Tablet 3    VASCEPA 1 g Cap TAKE 1 CAPSULE BY MOUTH TWICE A  Capsule 1     No current facility-administered medications for this visit.     Past Medical/ Surgical History  He  has a past medical history of Arrhythmia (2011), Arthritis of left shoulder region (9/14/2018), Atypical atrial flutter (HCC) seen 12/2019, Chronic anticoagulation, H/O cocaine use (10/12/2013), H/O left inguinal hernia (3/26/2013), Heart burn, High cholesterol, Hypertension,  "Hypertriglyceridemia, Pneumonia (2018), Renal disorder (2011), and Right hydrocele (11/30/2018).  He  has a past surgical history that includes other (2011); gastroscopy-endo (10/13/2013); colonoscopy; colonoscopy (N/A, 9/10/2018); inguinal hernia repair (3/26/2013); pr colonoscopy,diagnostic (5/26/2020); and pr colonoscopy,diagnostic (N/A, 11/16/2022).       Objective:   /72   Pulse 85   Temp 36.4 °C (97.6 °F)   Ht 1.778 m (5' 10\")   Wt 79.8 kg (176 lb)   SpO2 97%  Body mass index is 25.25 kg/m².    Physical exam was made by observation   Physical Exam  Constitutional:       General: He is not in acute distress.  HENT:      Head: Normocephalic and atraumatic.   Eyes:      Conjunctiva/sclera: Conjunctivae normal.      Pupils: Pupils are equal, round, and reactive to light.   Pulmonary:      Effort: Pulmonary effort is normal. No respiratory distress.   Abdominal:      General: There is no distension.   Musculoskeletal:      Cervical back: Normal range of motion and neck supple.   Skin:     General: Skin is warm and dry.      Findings: No rash.   Neurological:      Mental Status: He is alert and oriented to person, place, and time.      Gait: Gait is intact.   Psychiatric:         Mood and Affect: Affect normal.          Lab/ Imaging Results:  No labs or imaging to review    Please note that this dictation was created using voice recognition software. I have made every reasonable attempt to correct obvious errors, but I expect that there are errors of grammar and possibly content that I did not discover before finalizing the note.      "

## 2024-07-10 ENCOUNTER — OFFICE VISIT (OUTPATIENT)
Dept: SPORTS MEDICINE | Facility: OTHER | Age: 79
End: 2024-07-10
Attending: FAMILY MEDICINE
Payer: MEDICARE

## 2024-07-10 VITALS
WEIGHT: 171 LBS | BODY MASS INDEX: 24.48 KG/M2 | HEART RATE: 74 BPM | DIASTOLIC BLOOD PRESSURE: 64 MMHG | OXYGEN SATURATION: 96 % | HEIGHT: 70 IN | SYSTOLIC BLOOD PRESSURE: 110 MMHG | RESPIRATION RATE: 16 BRPM | TEMPERATURE: 97.1 F

## 2024-07-10 DIAGNOSIS — M77.11 RIGHT LATERAL EPICONDYLITIS: ICD-10-CM

## 2024-07-10 DIAGNOSIS — G89.29 CHRONIC ELBOW PAIN, RIGHT: ICD-10-CM

## 2024-07-10 DIAGNOSIS — M25.521 CHRONIC ELBOW PAIN, RIGHT: ICD-10-CM

## 2024-07-10 PROCEDURE — 99213 OFFICE O/P EST LOW 20 MIN: CPT | Performed by: FAMILY MEDICINE

## 2024-07-10 PROCEDURE — 3074F SYST BP LT 130 MM HG: CPT | Performed by: FAMILY MEDICINE

## 2024-07-10 PROCEDURE — 3078F DIAST BP <80 MM HG: CPT | Performed by: FAMILY MEDICINE

## 2024-07-10 ASSESSMENT — ENCOUNTER SYMPTOMS: FEVER: 0

## 2024-07-10 ASSESSMENT — FIBROSIS 4 INDEX: FIB4 SCORE: 1.71

## 2024-07-14 SDOH — ECONOMIC STABILITY: FOOD INSECURITY: WITHIN THE PAST 12 MONTHS, YOU WORRIED THAT YOUR FOOD WOULD RUN OUT BEFORE YOU GOT MONEY TO BUY MORE.: NEVER TRUE

## 2024-07-14 SDOH — HEALTH STABILITY: PHYSICAL HEALTH: ON AVERAGE, HOW MANY MINUTES DO YOU ENGAGE IN EXERCISE AT THIS LEVEL?: 40 MIN

## 2024-07-14 SDOH — ECONOMIC STABILITY: FOOD INSECURITY: WITHIN THE PAST 12 MONTHS, THE FOOD YOU BOUGHT JUST DIDN'T LAST AND YOU DIDN'T HAVE MONEY TO GET MORE.: NEVER TRUE

## 2024-07-14 SDOH — ECONOMIC STABILITY: HOUSING INSECURITY: IN THE LAST 12 MONTHS, HOW MANY PLACES HAVE YOU LIVED?: 1

## 2024-07-14 SDOH — HEALTH STABILITY: PHYSICAL HEALTH: ON AVERAGE, HOW MANY DAYS PER WEEK DO YOU ENGAGE IN MODERATE TO STRENUOUS EXERCISE (LIKE A BRISK WALK)?: 2 DAYS

## 2024-07-14 SDOH — ECONOMIC STABILITY: INCOME INSECURITY: IN THE LAST 12 MONTHS, WAS THERE A TIME WHEN YOU WERE NOT ABLE TO PAY THE MORTGAGE OR RENT ON TIME?: NO

## 2024-07-14 SDOH — ECONOMIC STABILITY: INCOME INSECURITY: HOW HARD IS IT FOR YOU TO PAY FOR THE VERY BASICS LIKE FOOD, HOUSING, MEDICAL CARE, AND HEATING?: NOT HARD AT ALL

## 2024-07-14 ASSESSMENT — SOCIAL DETERMINANTS OF HEALTH (SDOH)
HOW OFTEN DO YOU GET TOGETHER WITH FRIENDS OR RELATIVES?: TWICE A WEEK
HOW OFTEN DO YOU ATTEND CHURCH OR RELIGIOUS SERVICES?: MORE THAN 4 TIMES PER YEAR
HOW OFTEN DO YOU GET TOGETHER WITH FRIENDS OR RELATIVES?: TWICE A WEEK
DO YOU BELONG TO ANY CLUBS OR ORGANIZATIONS SUCH AS CHURCH GROUPS UNIONS, FRATERNAL OR ATHLETIC GROUPS, OR SCHOOL GROUPS?: YES
WITHIN THE PAST 12 MONTHS, YOU WORRIED THAT YOUR FOOD WOULD RUN OUT BEFORE YOU GOT THE MONEY TO BUY MORE: NEVER TRUE
DO YOU BELONG TO ANY CLUBS OR ORGANIZATIONS SUCH AS CHURCH GROUPS UNIONS, FRATERNAL OR ATHLETIC GROUPS, OR SCHOOL GROUPS?: YES
HOW OFTEN DO YOU ATTENT MEETINGS OF THE CLUB OR ORGANIZATION YOU BELONG TO?: MORE THAN 4 TIMES PER YEAR
HOW OFTEN DO YOU HAVE A DRINK CONTAINING ALCOHOL: 2-4 TIMES A MONTH
HOW HARD IS IT FOR YOU TO PAY FOR THE VERY BASICS LIKE FOOD, HOUSING, MEDICAL CARE, AND HEATING?: NOT HARD AT ALL
HOW OFTEN DO YOU ATTEND CHURCH OR RELIGIOUS SERVICES?: MORE THAN 4 TIMES PER YEAR
HOW OFTEN DO YOU HAVE SIX OR MORE DRINKS ON ONE OCCASION: LESS THAN MONTHLY
IN A TYPICAL WEEK, HOW MANY TIMES DO YOU TALK ON THE PHONE WITH FAMILY, FRIENDS, OR NEIGHBORS?: THREE TIMES A WEEK
IN A TYPICAL WEEK, HOW MANY TIMES DO YOU TALK ON THE PHONE WITH FAMILY, FRIENDS, OR NEIGHBORS?: THREE TIMES A WEEK
HOW OFTEN DO YOU ATTENT MEETINGS OF THE CLUB OR ORGANIZATION YOU BELONG TO?: MORE THAN 4 TIMES PER YEAR
IN THE PAST 12 MONTHS, HAS THE ELECTRIC, GAS, OIL, OR WATER COMPANY THREATENED TO SHUT OFF SERVICE IN YOUR HOME?: NO
HOW MANY DRINKS CONTAINING ALCOHOL DO YOU HAVE ON A TYPICAL DAY WHEN YOU ARE DRINKING: 1 OR 2

## 2024-07-14 ASSESSMENT — LIFESTYLE VARIABLES
AUDIT-C TOTAL SCORE: 3
HOW OFTEN DO YOU HAVE SIX OR MORE DRINKS ON ONE OCCASION: LESS THAN MONTHLY
HOW MANY STANDARD DRINKS CONTAINING ALCOHOL DO YOU HAVE ON A TYPICAL DAY: 1 OR 2
HOW OFTEN DO YOU HAVE A DRINK CONTAINING ALCOHOL: 2-4 TIMES A MONTH
SKIP TO QUESTIONS 9-10: 0

## 2024-07-15 ENCOUNTER — OFFICE VISIT (OUTPATIENT)
Dept: MEDICAL GROUP | Facility: MEDICAL CENTER | Age: 79
End: 2024-07-15
Payer: MEDICARE

## 2024-07-15 VITALS
BODY MASS INDEX: 24.62 KG/M2 | DIASTOLIC BLOOD PRESSURE: 72 MMHG | TEMPERATURE: 97.6 F | HEART RATE: 82 BPM | WEIGHT: 172 LBS | OXYGEN SATURATION: 96 % | SYSTOLIC BLOOD PRESSURE: 124 MMHG | HEIGHT: 70 IN

## 2024-07-15 DIAGNOSIS — Z00.00 ENCOUNTER FOR ANNUAL WELLNESS EXAM IN MEDICARE PATIENT: Primary | ICD-10-CM

## 2024-07-15 DIAGNOSIS — Z87.891 HISTORY OF TOBACCO ABUSE: ICD-10-CM

## 2024-07-15 DIAGNOSIS — E78.5 DYSLIPIDEMIA: Chronic | ICD-10-CM

## 2024-07-15 PROCEDURE — G0439 PPPS, SUBSEQ VISIT: HCPCS | Performed by: FAMILY MEDICINE

## 2024-07-15 PROCEDURE — 3074F SYST BP LT 130 MM HG: CPT | Performed by: FAMILY MEDICINE

## 2024-07-15 PROCEDURE — 3078F DIAST BP <80 MM HG: CPT | Performed by: FAMILY MEDICINE

## 2024-07-15 ASSESSMENT — PATIENT HEALTH QUESTIONNAIRE - PHQ9: CLINICAL INTERPRETATION OF PHQ2 SCORE: 0

## 2024-07-15 ASSESSMENT — ENCOUNTER SYMPTOMS: GENERAL WELL-BEING: GOOD

## 2024-07-15 ASSESSMENT — FIBROSIS 4 INDEX: FIB4 SCORE: 1.71

## 2024-07-15 ASSESSMENT — ACTIVITIES OF DAILY LIVING (ADL): BATHING_REQUIRES_ASSISTANCE: 0

## 2024-07-24 DIAGNOSIS — F10.10 ALCOHOL ABUSE: ICD-10-CM

## 2024-07-25 RX ORDER — NALTREXONE HYDROCHLORIDE 50 MG/1
100 TABLET, FILM COATED ORAL DAILY
Qty: 180 TABLET | Refills: 3 | Status: SHIPPED | OUTPATIENT
Start: 2024-07-25

## 2024-07-31 ENCOUNTER — TELEPHONE (OUTPATIENT)
Dept: HEALTH INFORMATION MANAGEMENT | Facility: OTHER | Age: 79
End: 2024-07-31

## 2024-07-31 NOTE — TELEPHONE ENCOUNTER
Outcome:LVM       Called to verify program eligibility/LVM with Direct line for call back/Warm Transfer to Patient Outreach [id number]

## 2024-07-31 NOTE — TELEPHONE ENCOUNTER
hthh1. Age 50-77 yrs of age?   78  2. Total Yrs Smoking cigarettes?    (# yrs total smoking)   40  3. 20 pack year hx of smoking, or greater (average packs per day)?   40x0.5=20  4. Current smoker or if quit, has pt quit within last 15 yrs?  quit 13 years ago  5. Completed Lung Cancer screen CT with [facility] previously?  no     6. Anything noted on previous CT involving lungs? ([place], [place], Etc.)  no    7. Any signs or symptoms of lung cancer? ( New / change to Cough, Wheezing, S.O.B., coughing up blood, unexplained weight loss within last year)?   no  8. Previous history of lung cancer?   no

## 2024-08-01 ENCOUNTER — APPOINTMENT (OUTPATIENT)
Dept: RADIOLOGY | Facility: MEDICAL CENTER | Age: 79
End: 2024-08-01
Attending: FAMILY MEDICINE
Payer: MEDICARE

## 2024-08-01 DIAGNOSIS — M77.11 RIGHT LATERAL EPICONDYLITIS: ICD-10-CM

## 2024-08-01 DIAGNOSIS — G89.29 CHRONIC ELBOW PAIN, RIGHT: ICD-10-CM

## 2024-08-01 DIAGNOSIS — M25.521 CHRONIC ELBOW PAIN, RIGHT: ICD-10-CM

## 2024-08-01 PROCEDURE — 73221 MRI JOINT UPR EXTREM W/O DYE: CPT | Mod: RT

## 2024-10-10 DIAGNOSIS — F33.0 MILD EPISODE OF RECURRENT MAJOR DEPRESSIVE DISORDER (HCC): ICD-10-CM

## 2024-10-10 RX ORDER — PAROXETINE 10 MG/1
10 TABLET, FILM COATED ORAL
Qty: 90 TABLET | Refills: 1 | Status: SHIPPED | OUTPATIENT
Start: 2024-10-10

## 2024-12-06 ENCOUNTER — OFFICE VISIT (OUTPATIENT)
Dept: MEDICAL GROUP | Facility: MEDICAL CENTER | Age: 79
End: 2024-12-06
Payer: MEDICARE

## 2024-12-06 VITALS
TEMPERATURE: 97.3 F | HEART RATE: 89 BPM | BODY MASS INDEX: 24.91 KG/M2 | SYSTOLIC BLOOD PRESSURE: 124 MMHG | WEIGHT: 174 LBS | HEIGHT: 70 IN | OXYGEN SATURATION: 96 % | DIASTOLIC BLOOD PRESSURE: 64 MMHG

## 2024-12-06 DIAGNOSIS — Z78.9 DAILY CONSUMPTION OF ALCOHOL: ICD-10-CM

## 2024-12-06 DIAGNOSIS — G47.09 OTHER INSOMNIA: ICD-10-CM

## 2024-12-06 PROCEDURE — 3078F DIAST BP <80 MM HG: CPT | Performed by: FAMILY MEDICINE

## 2024-12-06 PROCEDURE — 99214 OFFICE O/P EST MOD 30 MIN: CPT | Performed by: FAMILY MEDICINE

## 2024-12-06 PROCEDURE — 3074F SYST BP LT 130 MM HG: CPT | Performed by: FAMILY MEDICINE

## 2024-12-06 RX ORDER — QUETIAPINE FUMARATE 50 MG/1
50-150 TABLET, FILM COATED ORAL NIGHTLY
Qty: 270 TABLET | Refills: 0 | Status: SHIPPED | OUTPATIENT
Start: 2024-12-06

## 2024-12-06 RX ORDER — DISULFIRAM 250 MG/1
250 TABLET ORAL DAILY
Qty: 90 TABLET | Refills: 3 | Status: SHIPPED | OUTPATIENT
Start: 2024-12-06

## 2024-12-06 ASSESSMENT — FIBROSIS 4 INDEX: FIB4 SCORE: 1.74

## 2024-12-06 NOTE — PROGRESS NOTES
Verbal consent was acquired by the patient to use [employer] listening note generation during this visit:  Yes      Chief complaint::Diagnoses of Other insomnia and Daily consumption of alcohol were pertinent to this visit.    Assessment and Plan:   The following treatment plan was discussed:     Assessment & Plan  1. Primary insomnia - Chronic, stable  - Stop trazodone  - Start Seroquel 50 mg, 30 minutes before bedtime, increase up to 150 mg as needed  - Conduct sleep study to rule out sleep apnea    2. 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 Atrial flutter - Chronic, stable  - Physical exam: premature complexes, normal sinus rhythm  - Follow up with cardiologist Dr. Cruz if palpitations occur    Follow-up  - Return in 4 weeks for virtual visit  Roland was seen today for medication follow-up.    Diagnoses and all orders for this visit:    Other insomnia  -     QUEtiapine (SEROQUEL) 50 MG tablet; Take 1-3 Tablets by mouth every evening.  -     Overnight Home Sleep Study; Future    Daily consumption of alcohol  -     HEPATIC FUNCTION PANEL; Standing  -     disulfiram (ANTABUSE) 250 MG Tab; Take 1 Tablet by mouth every day.        Followup: Return if symptoms worsen or fail to improve.    Subjective/HPI:     HPI:    Papo Souza is a pleasant 79 y.o. male here for   Chief Complaint   Patient presents with    Medication Follow-up         History of Present Illness  The patient is a 79-year-old individual here for insomnia follow-up.    They take trazodone 50 mg, sometimes 100 mg, but only sleep 5 hours. No sleep study done, infrequent snoring, no daytime fatigue, occasional naps. No observed apnea by their spouse.    They take naltrexone but forget sometimes. Seek alternative due to stomach upset. Receiving counseling after  "their child's suicide.    Not on medication for hypertension. Not taking Xarelto.    Planning a trip to [facility] in two weeks, no primary care physician there.    Current Medicines (including changes today)  Current Outpatient Medications   Medication Sig Dispense Refill    QUEtiapine (SEROQUEL) 50 MG tablet Take 1-3 Tablets by mouth every evening. 270 Tablet 0    disulfiram (ANTABUSE) 250 MG Tab Take 1 Tablet by mouth every day. 90 Tablet 3    PARoxetine (PAXIL) 10 MG Tab TAKE 1 TABLET BY MOUTH EVERY DAY 90 Tablet 1    naltrexone (DEPADE) 50 MG Tab Take 2 Tablets by mouth every day. 180 Tablet 3    simvastatin (ZOCOR) 40 MG Tab Take 1 Tablet by mouth every day. 90 Tablet 3    nitroglycerin (NITRO-BID) 2 % Ointment Apply to ravinder rectal area twice daily as needed for tenderness or swelling of minor hemorrhoids 15 g 2    NON SPECIFIED C-NITROGLYCERIN 0.125% OINT SIG: 15, Refills: 2. Indiciation for use: Tenderness or swelling of minor hemorrhoids.  Directions for use: Apply to ravinder-rectal area twice a day, as needed. 15 Each 2    VASCEPA 1 g Cap TAKE 1 CAPSULE BY MOUTH TWICE A  Capsule 1     No current facility-administered medications for this visit.     Past Medical/ Surgical History  He  has a past medical history of Arrhythmia (2011), Arthritis of left shoulder region (9/14/2018), Atypical atrial flutter (HCC) seen 12/2019, Chronic anticoagulation, H/O cocaine use (10/12/2013), H/O left inguinal hernia (3/26/2013), Heart burn, High cholesterol, Hypertension, Hypertriglyceridemia, Pneumonia (2018), Renal disorder (2011), and Right hydrocele (11/30/2018).  He  has a past surgical history that includes other (2011); gastroscopy-endo (10/13/2013); colonoscopy; colonoscopy (N/A, 9/10/2018); inguinal hernia repair (3/26/2013); pr colonoscopy,diagnostic (5/26/2020); and pr colonoscopy,diagnostic (N/A, 11/16/2022).       Objective:   /64   Pulse 89   Temp 36.3 °C (97.3 °F)   Ht 1.778 m (5' 10\")   Wt 78.9 kg " (174 lb)   SpO2 96%  Body mass index is 24.97 kg/m².    Physical Exam  Constitutional:       General: He is not in acute distress.     Appearance: He is not ill-appearing or toxic-appearing.   HENT:      Head: Normocephalic.      Right Ear: Tympanic membrane and external ear normal.      Left Ear: Tympanic membrane and external ear normal.      Nose: Nose normal. No rhinorrhea.      Mouth/Throat:      Mouth: Mucous membranes are moist.      Pharynx: Oropharynx is clear. No posterior oropharyngeal erythema.   Eyes:      General:         Right eye: No discharge.         Left eye: No discharge.      Conjunctiva/sclera: Conjunctivae normal.      Pupils: Pupils are equal, round, and reactive to light.   Cardiovascular:      Rate and Rhythm: Normal rate and regular rhythm.      Heart sounds: No murmur heard.  Pulmonary:      Effort: Pulmonary effort is normal. No respiratory distress.      Breath sounds: Normal breath sounds. No wheezing.   Abdominal:      General: Abdomen is flat.   Musculoskeletal:         General: No swelling or tenderness.      Cervical back: Normal range of motion and neck supple.      Right lower leg: No edema.      Left lower leg: No edema.   Skin:     General: Skin is warm.   Neurological:      General: No focal deficit present.      Mental Status: He is alert and oriented to person, place, and time. Mental status is at baseline.   Psychiatric:         Mood and Affect: Mood normal.          Lab/ Imaging Results:  No labs or imaging to review    Please note that this dictation was created using voice recognition software. I have made every reasonable attempt to correct obvious errors, but I expect that there are errors of grammar and possibly content that I did not discover before finalizing the note.

## 2024-12-10 ENCOUNTER — HOSPITAL ENCOUNTER (OUTPATIENT)
Dept: LAB | Facility: MEDICAL CENTER | Age: 79
End: 2024-12-10
Attending: FAMILY MEDICINE
Payer: MEDICARE

## 2024-12-10 DIAGNOSIS — Z78.9 DAILY CONSUMPTION OF ALCOHOL: ICD-10-CM

## 2024-12-10 PROCEDURE — 80076 HEPATIC FUNCTION PANEL: CPT

## 2024-12-10 PROCEDURE — 36415 COLL VENOUS BLD VENIPUNCTURE: CPT

## 2024-12-11 LAB
ALBUMIN SERPL BCP-MCNC: 4.3 G/DL (ref 3.2–4.9)
ALP SERPL-CCNC: 99 U/L (ref 30–99)
ALT SERPL-CCNC: 14 U/L (ref 2–50)
AST SERPL-CCNC: 19 U/L (ref 12–45)
BILIRUB CONJ SERPL-MCNC: <0.2 MG/DL (ref 0.1–0.5)
BILIRUB INDIRECT SERPL-MCNC: NORMAL MG/DL (ref 0–1)
BILIRUB SERPL-MCNC: 0.4 MG/DL (ref 0.1–1.5)
PROT SERPL-MCNC: 6.9 G/DL (ref 6–8.2)

## 2025-01-02 ENCOUNTER — PATIENT MESSAGE (OUTPATIENT)
Dept: MEDICAL GROUP | Facility: MEDICAL CENTER | Age: 80
End: 2025-01-02
Payer: MEDICARE

## 2025-01-02 DIAGNOSIS — G47.09 OTHER INSOMNIA: ICD-10-CM

## 2025-01-03 RX ORDER — TRAZODONE HYDROCHLORIDE 100 MG/1
100 TABLET ORAL NIGHTLY
Qty: 30 TABLET | Refills: 3 | Status: SHIPPED | OUTPATIENT
Start: 2025-01-03

## 2025-01-20 ENCOUNTER — APPOINTMENT (OUTPATIENT)
Dept: MEDICAL GROUP | Facility: MEDICAL CENTER | Age: 80
End: 2025-01-20
Payer: MEDICARE

## 2025-01-20 VITALS — WEIGHT: 174 LBS | HEIGHT: 70 IN | BODY MASS INDEX: 24.91 KG/M2

## 2025-01-20 ASSESSMENT — FIBROSIS 4 INDEX: FIB4 SCORE: 1.54

## 2025-02-15 DIAGNOSIS — E78.5 HYPERLIPIDEMIA, UNSPECIFIED HYPERLIPIDEMIA TYPE: ICD-10-CM

## 2025-02-18 RX ORDER — SIMVASTATIN 40 MG
20 TABLET ORAL DAILY
Qty: 45 TABLET | Refills: 0 | Status: SHIPPED | OUTPATIENT
Start: 2025-02-18

## 2025-02-21 ENCOUNTER — TELEPHONE (OUTPATIENT)
Dept: HEALTH INFORMATION MANAGEMENT | Facility: OTHER | Age: 80
End: 2025-02-21
Payer: MEDICARE

## 2025-03-07 ENCOUNTER — TELEPHONE (OUTPATIENT)
Dept: MEDICAL GROUP | Facility: MEDICAL CENTER | Age: 80
End: 2025-03-07
Payer: MEDICARE

## 2025-03-07 NOTE — TELEPHONE ENCOUNTER
Pt called and lvm that he was currently in [place] until mid April was having some back pain and wanted to know what he can or take to help with.    I called him back and lvm suggesting to call back for a VV or visit a UC where he is located at.

## 2025-03-11 ENCOUNTER — TELEMEDICINE (OUTPATIENT)
Dept: MEDICAL GROUP | Facility: MEDICAL CENTER | Age: 80
End: 2025-03-11
Payer: MEDICARE

## 2025-03-11 VITALS — HEIGHT: 70 IN | BODY MASS INDEX: 24.48 KG/M2 | WEIGHT: 171 LBS

## 2025-03-11 DIAGNOSIS — M54.50 CHRONIC MIDLINE LOW BACK PAIN WITHOUT SCIATICA: ICD-10-CM

## 2025-03-11 DIAGNOSIS — G89.29 CHRONIC MIDLINE LOW BACK PAIN WITHOUT SCIATICA: ICD-10-CM

## 2025-03-11 PROCEDURE — 99214 OFFICE O/P EST MOD 30 MIN: CPT | Mod: 95 | Performed by: FAMILY MEDICINE

## 2025-03-11 RX ORDER — METHOCARBAMOL 750 MG/1
750 TABLET, FILM COATED ORAL 4 TIMES DAILY
Qty: 120 TABLET | Refills: 5 | Status: SHIPPED | OUTPATIENT
Start: 2025-03-11

## 2025-03-11 ASSESSMENT — FIBROSIS 4 INDEX: FIB4 SCORE: 1.54

## 2025-03-11 NOTE — PROGRESS NOTES
Verbal consent was acquired by the patient to use [employer] ambient listening note generation during this visit:  Yes      Telemedicine Visit: Established Patient     This evaluation was conducted via  [employer]  using secure and encrypted videoconferencing technology. The patient was in their home in the [facility].    The patient's identity was confirmed and verbal consent was obtained for this virtual visit.       Chief complaint::The encounter diagnosis was Chronic midline low back pain without sciatica.    Assessment and Plan:   The following treatment plan was discussed:     Assessment & Plan  1. Chronic midline low back pain without sciatica: Chronic, unstable.  - Prescribed Robaxin 750 mg, 1 tablet up to 4 times daily, sent to preferred pharmacy in [place].  - Referral to [facility] in [place] made at patient's request.    Follow-up  - Follow-up as needed.  Roland was seen today for referral needed.    Diagnoses and all orders for this visit:    Chronic midline low back pain without sciatica  -     Referral to Physical Therapy    Other orders  -     methocarbamol (ROBAXIN-750) 750 MG Tab; Take 1 Tablet by mouth 4 times a day.        Followup: Return if symptoms worsen or fail to improve.    Subjective/HPI:     HPI:    Papo Souza is a pleasant 79 y.o. male here for   Chief Complaint   Patient presents with    Referral Needed     Lower back , spine , on and off for a few months, gets worse after playing golf         History of Present Illness  79-year-old individual presents via virtual visit requesting a referral to physical therapy for chronic back pain.    Reports chronic midline low back pain radiating to buttocks without sciatica. Requests referral to the same facility as their spouse in [place] and a muscle relaxer for severe twinges at night.    Current Medicines (including changes today)  Current Outpatient Medications   Medication Sig Dispense Refill     "methocarbamol (ROBAXIN-750) 750 MG Tab Take 1 Tablet by mouth 4 times a day. 120 Tablet 5    simvastatin (ZOCOR) 40 MG Tab TAKE 1/2 TABLET BY MOUTH DAILY 45 Tablet 0    traZODone (DESYREL) 100 MG Tab Take 1 Tablet by mouth every evening. 30 Tablet 3    disulfiram (ANTABUSE) 250 MG Tab Take 1 Tablet by mouth every day. 90 Tablet 3    PARoxetine (PAXIL) 10 MG Tab TAKE 1 TABLET BY MOUTH EVERY DAY 90 Tablet 1    naltrexone (DEPADE) 50 MG Tab Take 2 Tablets by mouth every day. 180 Tablet 3    nitroglycerin (NITRO-BID) 2 % Ointment Apply to ravinder rectal area twice daily as needed for tenderness or swelling of minor hemorrhoids 15 g 2    NON SPECIFIED C-NITROGLYCERIN 0.125% OINT SIG: 15, Refills: 2. Indiciation for use: Tenderness or swelling of minor hemorrhoids.  Directions for use: Apply to ravinder-rectal area twice a day, as needed. 15 Each 2    VASCEPA 1 g Cap TAKE 1 CAPSULE BY MOUTH TWICE A  Capsule 1     No current facility-administered medications for this visit.     Past Medical/ Surgical History  He  has a past medical history of Arrhythmia (2011), Arthritis of left shoulder region (9/14/2018), Atypical atrial flutter (HCC) seen 12/2019, Chronic anticoagulation, H/O cocaine use (10/12/2013), H/O left inguinal hernia (3/26/2013), Heart burn, High cholesterol, Hypertension, Hypertriglyceridemia, Pneumonia (2018), Renal disorder (2011), and Right hydrocele (11/30/2018).  He  has a past surgical history that includes other (2011); gastroscopy-endo (10/13/2013); colonoscopy; colonoscopy (N/A, 9/10/2018); inguinal hernia repair (3/26/2013); pr colonoscopy-flexible (5/26/2020); and pr colonoscopy-flexible (N/A, 11/16/2022).       Objective:   Ht 1.778 m (5' 10\")   Wt 77.6 kg (171 lb)  Body mass index is 24.54 kg/m².    Physical Exam:  Constitutional: Alert, no distress, well-groomed.  Skin: No rashes in visible areas.  Eye: Round. Conjunctiva clear, lids normal. No icterus.   ENMT: Lips pink without lesions, good " dentition, moist mucous membranes. Phonation normal.  Neck: No masses, no thyromegaly. Moves freely without pain.  CV: Pulse as reported by patient  Respiratory: Unlabored respiratory effort, no cough or audible wheeze  Psych: Alert and oriented x3, normal affect and mood.     Lab/ Imaging Results:  No labs or imaging to review    Please note that this dictation was created using voice recognition software. I have made every reasonable attempt to correct obvious errors, but I expect that there are errors of grammar and possibly content that I did not discover before finalizing the note.

## 2025-03-13 RX ORDER — TRAZODONE HYDROCHLORIDE 50 MG/1
50 TABLET ORAL
Qty: 90 TABLET | Refills: 5 | Status: SHIPPED | OUTPATIENT
Start: 2025-03-13

## 2025-03-18 NOTE — Clinical Note
REFERRAL APPROVAL NOTICE         Sent on March 18, 2025                   Roland Souza  [address] [facility] [address]                   Dear Mr. Souza,    After a careful review of the medical information and benefit coverage, [facility] has processed your referral. See below for additional details.    If applicable, you must be actively enrolled with your insurance for coverage of the authorized service. If you have any questions regarding your coverage, please contact your insurance directly.    REFERRAL INFORMATION   Referral #:  [id number]  Referred-To Provider    Referred-By Provider:  Physical Therapy    MICHAEL Freeman   [facility]ER[facility]ENCE [facility]      [address]  [address]  [place] [address]  [phone] [address] [facility] [address]  [phone]    Referral Start Date:  03/11/2025  Referral End Date:   03/11/2026             SCHEDULING  If you do not already have an appointment, please call [phone] to make an appointment.     MORE INFORMATION  If you do not already have a [email or web address] account, sign up at: [email or web address]  You can access your medical information, make appointments, see lab results, billing information, and more.  If you have questions regarding this referral, please contact  the [facility] Referrals department at:             [phone]. Monday - Friday 8:00AM - 5:00PM.     Sincerely,    [facility]

## 2025-05-03 DIAGNOSIS — G47.09 OTHER INSOMNIA: ICD-10-CM

## 2025-05-05 ENCOUNTER — APPOINTMENT (OUTPATIENT)
Dept: SLEEP MEDICINE | Facility: MEDICAL CENTER | Age: 80
End: 2025-05-05
Attending: FAMILY MEDICINE
Payer: MEDICARE

## 2025-05-05 RX ORDER — TRAZODONE HYDROCHLORIDE 100 MG/1
100 TABLET ORAL EVERY EVENING
Qty: 90 TABLET | Refills: 3 | Status: SHIPPED | OUTPATIENT
Start: 2025-05-05

## 2025-06-07 DIAGNOSIS — F33.0 MILD EPISODE OF RECURRENT MAJOR DEPRESSIVE DISORDER (HCC): ICD-10-CM

## 2025-06-08 DIAGNOSIS — E78.5 HYPERLIPIDEMIA, UNSPECIFIED HYPERLIPIDEMIA TYPE: ICD-10-CM

## 2025-06-09 RX ORDER — PAROXETINE 10 MG/1
10 TABLET, FILM COATED ORAL
Qty: 90 TABLET | Refills: 1 | Status: SHIPPED | OUTPATIENT
Start: 2025-06-09

## 2025-06-09 RX ORDER — SIMVASTATIN 40 MG
40 TABLET ORAL
Qty: 90 TABLET | Refills: 1 | Status: SHIPPED | OUTPATIENT
Start: 2025-06-09

## 2025-06-19 DIAGNOSIS — E78.5 DYSLIPIDEMIA: Chronic | ICD-10-CM

## 2025-07-10 ENCOUNTER — RESULTS FOLLOW-UP (OUTPATIENT)
Dept: MEDICAL GROUP | Facility: MEDICAL CENTER | Age: 80
End: 2025-07-10
Payer: MEDICARE

## 2025-07-10 ENCOUNTER — HOSPITAL ENCOUNTER (OUTPATIENT)
Dept: LAB | Facility: MEDICAL CENTER | Age: 80
End: 2025-07-10
Attending: FAMILY MEDICINE
Payer: MEDICARE

## 2025-07-10 DIAGNOSIS — E78.5 DYSLIPIDEMIA: Chronic | ICD-10-CM

## 2025-07-10 LAB
ALBUMIN SERPL BCP-MCNC: 4.1 G/DL (ref 3.2–4.9)
ALBUMIN/GLOB SERPL: 1.6 G/DL
ALP SERPL-CCNC: 107 U/L (ref 30–99)
ALT SERPL-CCNC: 11 U/L (ref 2–50)
ANION GAP SERPL CALC-SCNC: 10 MMOL/L (ref 7–16)
AST SERPL-CCNC: 18 U/L (ref 12–45)
BILIRUB SERPL-MCNC: 0.3 MG/DL (ref 0.1–1.5)
BUN SERPL-MCNC: 17 MG/DL (ref 8–22)
CALCIUM ALBUM COR SERPL-MCNC: 9 MG/DL (ref 8.5–10.5)
CALCIUM SERPL-MCNC: 9.1 MG/DL (ref 8.5–10.5)
CHLORIDE SERPL-SCNC: 105 MMOL/L (ref 96–112)
CHOLEST SERPL-MCNC: 143 MG/DL (ref 100–199)
CO2 SERPL-SCNC: 24 MMOL/L (ref 20–33)
CREAT SERPL-MCNC: 0.91 MG/DL (ref 0.5–1.4)
FASTING STATUS PATIENT QL REPORTED: NORMAL
GFR SERPLBLD CREATININE-BSD FMLA CKD-EPI: 86 ML/MIN/1.73 M 2
GLOBULIN SER CALC-MCNC: 2.6 G/DL (ref 1.9–3.5)
GLUCOSE SERPL-MCNC: 95 MG/DL (ref 65–99)
HDLC SERPL-MCNC: 42 MG/DL
LDLC SERPL CALC-MCNC: 70 MG/DL
POTASSIUM SERPL-SCNC: 4.3 MMOL/L (ref 3.6–5.5)
PROT SERPL-MCNC: 6.7 G/DL (ref 6–8.2)
SODIUM SERPL-SCNC: 139 MMOL/L (ref 135–145)
TRIGL SERPL-MCNC: 156 MG/DL (ref 0–149)

## 2025-07-10 PROCEDURE — 80053 COMPREHEN METABOLIC PANEL: CPT

## 2025-07-10 PROCEDURE — 80061 LIPID PANEL: CPT

## 2025-07-10 PROCEDURE — 36415 COLL VENOUS BLD VENIPUNCTURE: CPT

## 2025-07-12 SDOH — ECONOMIC STABILITY: FOOD INSECURITY: WITHIN THE PAST 12 MONTHS, THE FOOD YOU BOUGHT JUST DIDN'T LAST AND YOU DIDN'T HAVE MONEY TO GET MORE.: NEVER TRUE

## 2025-07-12 SDOH — HEALTH STABILITY: PHYSICAL HEALTH: ON AVERAGE, HOW MANY DAYS PER WEEK DO YOU ENGAGE IN MODERATE TO STRENUOUS EXERCISE (LIKE A BRISK WALK)?: 3 DAYS

## 2025-07-12 SDOH — HEALTH STABILITY: PHYSICAL HEALTH: ON AVERAGE, HOW MANY MINUTES DO YOU ENGAGE IN EXERCISE AT THIS LEVEL?: 150+ MIN

## 2025-07-12 SDOH — ECONOMIC STABILITY: FOOD INSECURITY: WITHIN THE PAST 12 MONTHS, YOU WORRIED THAT YOUR FOOD WOULD RUN OUT BEFORE YOU GOT MONEY TO BUY MORE.: NEVER TRUE

## 2025-07-12 SDOH — ECONOMIC STABILITY: INCOME INSECURITY: IN THE LAST 12 MONTHS, WAS THERE A TIME WHEN YOU WERE NOT ABLE TO PAY THE MORTGAGE OR RENT ON TIME?: NO

## 2025-07-12 SDOH — ECONOMIC STABILITY: INCOME INSECURITY: HOW HARD IS IT FOR YOU TO PAY FOR THE VERY BASICS LIKE FOOD, HOUSING, MEDICAL CARE, AND HEATING?: NOT HARD AT ALL

## 2025-07-12 SDOH — HEALTH STABILITY: MENTAL HEALTH
STRESS IS WHEN SOMEONE FEELS TENSE, NERVOUS, ANXIOUS, OR CAN'T SLEEP AT NIGHT BECAUSE THEIR MIND IS TROUBLED. HOW STRESSED ARE YOU?: ONLY A LITTLE

## 2025-07-12 ASSESSMENT — SOCIAL DETERMINANTS OF HEALTH (SDOH)
IN A TYPICAL WEEK, HOW MANY TIMES DO YOU TALK ON THE PHONE WITH FAMILY, FRIENDS, OR NEIGHBORS?: MORE THAN THREE TIMES A WEEK
HOW OFTEN DO YOU ATTENT MEETINGS OF THE CLUB OR ORGANIZATION YOU BELONG TO?: MORE THAN 4 TIMES PER YEAR
IN THE PAST 12 MONTHS, HAS THE ELECTRIC, GAS, OIL, OR WATER COMPANY THREATENED TO SHUT OFF SERVICE IN YOUR HOME?: NO
HOW OFTEN DO YOU GET TOGETHER WITH FRIENDS OR RELATIVES?: TWICE A WEEK
HOW OFTEN DO YOU HAVE SIX OR MORE DRINKS ON ONE OCCASION: LESS THAN MONTHLY
WITHIN THE PAST 12 MONTHS, YOU WORRIED THAT YOUR FOOD WOULD RUN OUT BEFORE YOU GOT THE MONEY TO BUY MORE: NEVER TRUE
HOW OFTEN DO YOU GET TOGETHER WITH FRIENDS OR RELATIVES?: TWICE A WEEK
HOW OFTEN DO YOU ATTENT MEETINGS OF THE CLUB OR ORGANIZATION YOU BELONG TO?: MORE THAN 4 TIMES PER YEAR
IN A TYPICAL WEEK, HOW MANY TIMES DO YOU TALK ON THE PHONE WITH FAMILY, FRIENDS, OR NEIGHBORS?: MORE THAN THREE TIMES A WEEK
DO YOU BELONG TO ANY CLUBS OR ORGANIZATIONS SUCH AS CHURCH GROUPS UNIONS, FRATERNAL OR ATHLETIC GROUPS, OR SCHOOL GROUPS?: YES
HOW OFTEN DO YOU ATTEND CHURCH OR RELIGIOUS SERVICES?: MORE THAN 4 TIMES PER YEAR
DO YOU BELONG TO ANY CLUBS OR ORGANIZATIONS SUCH AS CHURCH GROUPS UNIONS, FRATERNAL OR ATHLETIC GROUPS, OR SCHOOL GROUPS?: YES
HOW OFTEN DO YOU ATTEND CHURCH OR RELIGIOUS SERVICES?: MORE THAN 4 TIMES PER YEAR
HOW HARD IS IT FOR YOU TO PAY FOR THE VERY BASICS LIKE FOOD, HOUSING, MEDICAL CARE, AND HEATING?: NOT HARD AT ALL
HOW MANY DRINKS CONTAINING ALCOHOL DO YOU HAVE ON A TYPICAL DAY WHEN YOU ARE DRINKING: 1 OR 2
HOW OFTEN DO YOU HAVE A DRINK CONTAINING ALCOHOL: 2-3 TIMES A WEEK

## 2025-07-12 ASSESSMENT — LIFESTYLE VARIABLES
HOW OFTEN DO YOU HAVE SIX OR MORE DRINKS ON ONE OCCASION: LESS THAN MONTHLY
HOW OFTEN DO YOU HAVE A DRINK CONTAINING ALCOHOL: 2-3 TIMES A WEEK
HOW MANY STANDARD DRINKS CONTAINING ALCOHOL DO YOU HAVE ON A TYPICAL DAY: 1 OR 2
AUDIT-C TOTAL SCORE: 4
SKIP TO QUESTIONS 9-10: 0

## 2025-07-15 ENCOUNTER — OFFICE VISIT (OUTPATIENT)
Dept: MEDICAL GROUP | Facility: MEDICAL CENTER | Age: 80
End: 2025-07-15
Payer: MEDICARE

## 2025-07-15 VITALS
SYSTOLIC BLOOD PRESSURE: 124 MMHG | WEIGHT: 178 LBS | DIASTOLIC BLOOD PRESSURE: 68 MMHG | TEMPERATURE: 97.9 F | HEIGHT: 70 IN | OXYGEN SATURATION: 97 % | BODY MASS INDEX: 25.48 KG/M2 | HEART RATE: 83 BPM

## 2025-07-15 DIAGNOSIS — Z00.00 ANNUAL WELLNESS VISIT: Primary | ICD-10-CM

## 2025-07-15 DIAGNOSIS — Z12.5 SCREENING FOR PROSTATE CANCER: ICD-10-CM

## 2025-07-15 PROCEDURE — 3074F SYST BP LT 130 MM HG: CPT | Performed by: FAMILY MEDICINE

## 2025-07-15 PROCEDURE — G0439 PPPS, SUBSEQ VISIT: HCPCS | Performed by: FAMILY MEDICINE

## 2025-07-15 PROCEDURE — 3078F DIAST BP <80 MM HG: CPT | Performed by: FAMILY MEDICINE

## 2025-07-15 RX ORDER — NALTREXONE HYDROCHLORIDE 50 MG/1
50 TABLET, FILM COATED ORAL DAILY
COMMUNITY

## 2025-07-15 ASSESSMENT — FIBROSIS 4 INDEX: FIB4 SCORE: 1.65

## 2025-07-15 ASSESSMENT — ACTIVITIES OF DAILY LIVING (ADL): BATHING_REQUIRES_ASSISTANCE: 0

## 2025-07-15 ASSESSMENT — ENCOUNTER SYMPTOMS: GENERAL WELL-BEING: EXCELLENT

## 2025-07-15 ASSESSMENT — PATIENT HEALTH QUESTIONNAIRE - PHQ9: CLINICAL INTERPRETATION OF PHQ2 SCORE: 0

## 2025-07-15 NOTE — PROGRESS NOTES
Verbal consent was acquired by the patient to use [employer] ambient listening note generation during this visit Yes     Chief Complaint   Patient presents with    Annual Exam       HPI:  Papo Souza is a 79 y.o. here for Medicare Annual Wellness Visit     Patient Active Problem List    Diagnosis Date Noted    Chronic midline low back pain without sciatica 07/13/2023    Atherosclerosis of aorta (HCC) 07/13/2023    Other thrombophilia (HCC) 07/13/2023    Acute cough 06/12/2023    Dermatitis 09/19/2022    Weight loss, unintentional 09/19/2022    Lung nodule 06/16/2022    Hypertriglyceridemia     Prediabetes 02/24/2021    Other insomnia 02/24/2021    Atypical atrial flutter (HCC) seen 12/2019     Chronic anticoagulation     History of tobacco use 11/27/2019    Encounter for annual wellness visit (AWV) in Medicare patient 11/27/2019    Hemorrhoids 01/13/2019    Tubular adenoma of colon 06/01/2018    Essential hypertension 04/19/2018    Hepatic steatosis 03/01/2018    Diverticulosis of large intestine without hemorrhage 02/28/2018    (HCC) Mild episode of recurrent major depressive disorder 02/28/2018    Dyslipidemia     Burgess's esophagus with dysplasia 10/15/2013    PAF (paroxysmal atrial fibrillation) (HCC) 10/12/2013       Current Medications[1]       Current supplements as per medication list.     Allergies: Amoxicillin    Current social contact/activities: Golf, walking, volunteer work     He  reports that he quit smoking about 13 years ago. His smoking use included cigarettes. He started smoking about 63 years ago. He has a 30 pack-year smoking history. He has never used smokeless tobacco. He reports current alcohol use of about 2.4 oz of alcohol per week. He reports that he does not use drugs.  Counseling given: Not Answered      ROS:    Gait: Uses no assistive device  Ostomy: No  Other tubes: No  Amputations: No  Chronic oxygen use: No  Last eye exam: nov 2024  Wears hearing aids: No   : Denies any  urinary leakage during the last 6 months    Screening:    Depression Screening  Little interest or pleasure in doing things?  0 - not at all  Feeling down, depressed , or hopeless? 0 - not at all  Patient Health Questionnaire Score: 0     If depressive symptoms identified deferred to follow up visit unless specifically addressed in assessment and plan.    Interpretation of PHQ-9 Total Score   Score Severity   1-4 No Depression   5-9 Mild Depression   10-14 Moderate Depression   15-19 Moderately Severe Depression   20-27 Severe Depression    Screening for Cognitive Impairment  Do you or any of your friends or family members have any concern about your memory? No  Three Minute Recall (Village, Kitchen, Baby) 3/3    Damaso clock face with all 12 numbers and set the hands to show 10 minutes past 11.  Yes    Cognitive concerns identified deferred for follow up unless specifically addressed in assessment and plan.    Fall Risk Assessment  Has the patient had two or more falls in the last year or any fall with injury in the last year?  No    Safety Assessment  Do you always wear your seatbelt?  Yes  Any changes to home needed to function safely? No  Difficulty hearing.  No  Patient counseled about all safety risks that were identified.    Functional Assessment ADLs  Are there any barriers preventing you from cooking for yourself or meeting nutritional needs?  No.    Are there any barriers preventing you from driving safely or obtaining transportation?  No.    Are there any barriers preventing you from using a telephone or calling for help?  No    Are there any barriers preventing you from shopping?  No.    Are there any barriers preventing you from taking care of your own finances?  No    Are there any barriers preventing you from managing your medications?  No    Are there any barriers preventing you from showering, bathing or dressing yourself? No    Are there any barriers preventing you from doing housework or laundry?  No  Are there any barriers preventing you from using the toilet?No  Are you currently engaging in any exercise or physical activity?  Yes.      Self-Assessment of Health  What is your perception of your health? Excellent    Do you sleep more than six hours a night? Yes    In the past 7 days, how much did pain keep you from doing your normal work? None    Do you spend quality time with family or friends (virtually or in person)? Yes    Do you usually eat a heart healthy diet that constists of a variety of fruits, vegetables, whole grains and fiber? No    Do you eat foods high in fat and/or Fast Food more than three times per week? No    How concerned are you that your medical conditions are not being well managed? Not at all    Are you worried that in the next 2 months, you may not have stable housing that you own, rent, or stay in as part of a household? No      Advance Care Planning  Do you have an Advance Directive, Living Will, Durable Power of , or POLST? Yes                 Health Maintenance Summary            Current Care Gaps       Annual Wellness Visit (Yearly) Overdue since 7/15/2025      07/15/2024  Level of Service: DC ANNUAL WELLNESS VISIT-INCLUDES PPPS SUBSEQUE*    07/15/2024  Subsequent Annual Wellness Visit - Includes PPPS ()    06/12/2023  Level of Service: DC ANNUAL WELLNESS VISIT-INCLUDES PPPS SUBSEQUE*    06/12/2023  Subsequent Annual Wellness Visit - Includes PPPS ()    06/16/2022  Level of Service: DC INITIAL ANNUAL WELLNESS VISIT-INCLUDES PPPS     Only the first 5 history entries have been loaded, but more history exists.            Lung Cancer Screening Shared Decision Making (Once) Never done      No completion history exists for this topic.                      Needs Review       Hepatitis C Screening  Tentatively Complete      03/01/2018  Hepatitis C Antibody component of HEPATITIS PANEL ACUTE(4 COMPONENTS)                      Awaiting Completion       Prostate Specific  Antigen (PSA) Screening (Yearly) Order placed this encounter      07/15/2025  Order placed for PROSTATE SPECIFIC AG DIAGNOSTIC by MICHAEL Freeman    05/18/2021  Prostatic Specific Antigen Tot component of PROSTATE SPECIFIC AG SCREENING    02/19/2020  Prostatic Specific Antigen Tot component of PROSTATE SPECIFIC AG                      Upcoming       COVID-19 Vaccine (6 - 2024-25 season) Postponed until 7/15/2026      01/02/2025  Imm Admin: Covid-19 Mrna (Spikevax) Moderna 12+ Years    06/27/2022  Imm Admin: MODERNA SARS-COV-2 VACCINE (12+)    11/08/2021  Imm Admin: MODERNA SARS-COV-2 VACCINE (12+)    03/25/2021  Imm Admin: MODERNA SARS-COV-2 VACCINE (12+)    02/25/2021  Imm Admin: MODERNA SARS-COV-2 VACCINE (12+)     Only the first 5 history entries have been loaded, but more history exists.            Influenza Vaccine (1) Next due on 9/1/2025 09/23/2024  Imm Admin: Influenza high-dose trivalent (PF)    09/11/2023  Imm Admin: Influenza Vaccine Adult HD    09/19/2022  Imm Admin: Influenza Vaccine Adult HD    11/01/2021  Imm Admin: Influenza Vaccine Adult HD    10/22/2019  Imm Admin: Influenza Vaccine Adult HD      Only the first 5 history entries have been loaded, but more history exists.              IMM DTaP/Tdap/Td Vaccine (2 - Td or Tdap) Next due on 11/9/2028 11/09/2018  Imm Admin: Tdap Vaccine                      Completed or No Longer Recommended       Zoster (Shingles) Vaccines (Series Information) Completed      05/24/2021  Imm Admin: Zoster Vaccine Recombinant (RZV) (SHINGRIX)    09/29/2020  Imm Admin: Zoster Vaccine Recombinant (RZV) (SHINGRIX)              Pneumococcal Vaccine: 50+ Years (Series Information) Completed      11/27/2019  Imm Admin: Pneumococcal polysaccharide vaccine (PPSV-23)    02/28/2018  Imm Admin: Pneumococcal Conjugate Vaccine (Prevnar/PCV-13)    09/27/2013  Imm Admin: Pneumococcal Vaccine (UF) - HISTORICAL DATA              Hepatitis A Vaccine (Hep A) (Series  Information) Aged Out      No completion history exists for this topic.              Hepatitis B Vaccine (Hep B) (Series Information) Aged Out     No completion history exists for this topic.              HPV Vaccines (Series Information) Aged Out     No completion history exists for this topic.              Polio Vaccine (Inactivated Polio) (Series Information) Aged Out     No completion history exists for this topic.              Meningococcal Immunization (Series Information) Aged Out     No completion history exists for this topic.              Meningococcal B Vaccine (Series Information) Aged Out     No completion history exists for this topic.              Colorectal Cancer Screening  Discontinued        Frequency changed to Never automatically (Topic No Longer Applies)    11/16/2022  Surgical Procedure: MO COLONOSCOPY,DIAGNOSTIC    05/26/2020  Surgical Procedure: MO COLONOSCOPY,DIAGNOSTIC    09/10/2018  Surgical Procedure: COLONOSCOPY    05/25/2018  REFERRAL TO GI FOR COLONOSCOPY     Only the first 5 history entries have been loaded, but more history exists.            Lung Cancer Screening  Discontinued        Frequency changed to Never automatically (Topic No Longer Applies)    06/27/2022  CT-CHEST (THORAX) W/O              Abdominal Aortic Aneurysm (AAA) Screening  Discontinued        Frequency changed to Never automatically (Topic No Longer Applies)    02/28/2018  CT-RENAL COLIC EVALUATION(A/P W/O)                            Patient Care Team:  MICHAEL Freeman as PCP - General (Family Medicine)  Migel Cordero M.D. as Consulting Physician (Gastroenterology)        Social History[2]  Family History   Problem Relation Age of Onset    No Known Problems Mother     No Known Problems Father     No Known Problems Brother     No Known Problems Brother      He  has a past medical history of Arrhythmia (2011), Arthritis of left shoulder region (9/14/2018), Atypical atrial flutter (HCC) seen 12/2019,  "Chronic anticoagulation, H/O cocaine use (10/12/2013), H/O left inguinal hernia (3/26/2013), Heart burn, High cholesterol, Hypertension, Hypertriglyceridemia, Pneumonia (2018), Renal disorder (2011), and Right hydrocele (11/30/2018).   Past Surgical History[3]    Exam:   /68   Pulse 83   Temp 36.6 °C (97.9 °F)   Ht 1.778 m (5' 10\")   Wt 80.7 kg (178 lb)   SpO2 97%  Body mass index is 25.54 kg/m².    Hearing excellent.    Dentition good  Alert, oriented in no acute distress.  Eye contact is good, speech goal directed, affect calm    Physical Exam  Constitutional:       General: He is not in acute distress.  HENT:      Head: Normocephalic and atraumatic.      Right Ear: Tympanic membrane and external ear normal.      Left Ear: Tympanic membrane and external ear normal.      Nose: No nasal deformity.      Mouth/Throat:      Lips: Pink.      Mouth: Mucous membranes are moist.      Pharynx: Oropharynx is clear. Uvula midline. No posterior oropharyngeal erythema.   Eyes:      General: Lids are normal.      Extraocular Movements: Extraocular movements intact.      Conjunctiva/sclera: Conjunctivae normal.      Pupils: Pupils are equal, round, and reactive to light.   Neck:      Trachea: Trachea normal.   Cardiovascular:      Rate and Rhythm: Normal rate and regular rhythm.      Heart sounds: Normal heart sounds. No murmur heard.     No friction rub. No gallop.   Pulmonary:      Effort: Pulmonary effort is normal. No accessory muscle usage.      Breath sounds: Normal breath sounds. No wheezing or rales.   Abdominal:      General: Bowel sounds are normal.      Palpations: Abdomen is soft.      Tenderness: There is no abdominal tenderness.   Musculoskeletal:      Cervical back: Normal range of motion and neck supple.      Right lower leg: No edema.      Left lower leg: No edema.   Lymphadenopathy:      Cervical: No cervical adenopathy.   Skin:     General: Skin is warm and dry.      Findings: No rash. "   Neurological:      General: No focal deficit present.      Mental Status: He is alert and oriented to person, place, and time. Mental status is at baseline.      GCS: GCS eye subscore is 4. GCS verbal subscore is 5. GCS motor subscore is 6.      Motor: No weakness.      Gait: Gait is intact.   Psychiatric:         Attention and Perception: Attention normal.         Mood and Affect: Mood and affect normal.         Speech: Speech normal.       Results  - Labs:    - GFR: Normal    - Electrolytes: Normal    - Blood sugar: 95 mg/dL    - BUN/creatinine: Good kidney function    - Liver enzymes: Stable    - Triglycerides: 156 mg/dL    - LDL: Good    Assessment and Plan. The following treatment and monitoring plan is recommended:    Assessment & Plan  1. Annual health examination: Stable.  Services suggested: No services needed at this time  Health Care Screening: Age-appropriate preventive services recommended by USPTF and ACIP covered by Medicare were discussed today. Services ordered if indicated and agreed upon by the patient.  Referrals offered: Community-based lifestyle interventions to reduce health risks and promote self-management and wellness, fall prevention, nutrition, physical activity, tobacco-use cessation, weight loss, and mental health services as per orders if indicated.    Discussion today about general wellness and lifestyle habits:    Prevent falls and reduce trip hazards; Cautioned about securing or removing rugs.  Have a working fire alarm and carbon monoxide detector;   Engage in regular physical activity and social activities     2. Depression: Improved.  - Continue trazodone 150 mg.  - Continue Paxil 10 mg.    3. Hyperlipidemia: Chronic, stable.  - Continue simvastatin 40 mg.  - Continue Vascepa 1 g.    Follow-up  - Follow-up in 4 months.    Roland was seen today for annual exam.    Diagnoses and all orders for this visit:    Annual wellness visit    Screening for prostate cancer  -     PROSTATE  SPECIFIC AG DIAGNOSTIC; Future      Follow-up: Return in about 4 months (around 11/15/2025) for follow-up.    Please note that this dictation was created using voice recognition software. I have made every reasonable attempt to correct obvious errors, but I expect that there are errors of grammar and possibly content that I did not discover before finalizing the note.         [1]   Current Outpatient Medications   Medication Sig Dispense Refill    naltrexone (DEPADE) 50 MG Tab Take 50 mg by mouth every day.      PARoxetine (PAXIL) 10 MG Tab TAKE 1 TABLET BY MOUTH EVERY DAY 90 Tablet 1    simvastatin (ZOCOR) 40 MG Tab TAKE 1 TABLET BY MOUTH EVERY DAY 90 Tablet 1    traZODone (DESYREL) 100 MG Tab TAKE 1 TABLET BY MOUTH EVERY DAY IN THE EVENING 90 Tablet 3    traZODone (DESYREL) 50 MG Tab TAKE 1 TABLET BY MOUTH EVERY DAY AT BEDTIME AS NEEDED FOR SLEEP 90 Tablet 5    nitroglycerin (NITRO-BID) 2 % Ointment Apply to ravinder rectal area twice daily as needed for tenderness or swelling of minor hemorrhoids 15 g 2    NON SPECIFIED C-NITROGLYCERIN 0.125% OINT SIG: 15, Refills: 2. Indiciation for use: Tenderness or swelling of minor hemorrhoids.  Directions for use: Apply to ravinder-rectal area twice a day, as needed. 15 Each 2    VASCEPA 1 g Cap TAKE 1 CAPSULE BY MOUTH TWICE A  Capsule 1     No current facility-administered medications for this visit.   [2]   Social History  Tobacco Use    Smoking status: Former     Current packs/day: 0.00     Average packs/day: 0.6 packs/day for 50.0 years (30.0 ttl pk-yrs)     Types: Cigarettes     Start date: 10/1/1961     Quit date: 10/1/2011     Years since quittin.7    Smokeless tobacco: Never   Vaping Use    Vaping status: Never Used   Substance Use Topics    Alcohol use: Yes     Alcohol/week: 2.4 oz     Types: 4 Standard drinks or equivalent per week     Comment: 4 drinks a week    Drug use: Never   [3]   Past Surgical History:  Procedure Laterality Date    [place]  COLONOSCOPY-FLEXIBLE N/A 11/16/2022    Procedure: COLONOSCOPY;  Surgeon: Evelio Mackenzie M.D.;  Location: SURGERY [facility];  Service: Gastroenterology    [place] COLONOSCOPY-FLEXIBLE  5/26/2020    Procedure: COLONOSCOPY - WITH POSSIBLE BIOPSY, DILATION, POLYPECTOMY, CONTROL OF HEMORRHAGE;  Surgeon: Migel Cordero M.D.;  Location: SURGERY [facility];  Service: Gastroenterology    COLONOSCOPY N/A 9/10/2018    Procedure: COLONOSCOPY;  Surgeon: Migel Cordero M.D.;  Location: SURGERY SAME DAY [facility];  Service: Gastroenterology    GASTROSCOPY-ENDO  10/13/2013    Performed by Oseas Madden M.D. at ENDOSCOPY [facility]    INGUINAL HERNIA REPAIR  3/26/2013    Performed by Felipe Carter M.D. at SURGERY SAME DAY [facility]    OTHER  2011    dialysis cath insertion and removal    COLONOSCOPY

## 2025-08-18 ENCOUNTER — OFFICE VISIT (OUTPATIENT)
Dept: MEDICAL GROUP | Facility: MEDICAL CENTER | Age: 80
End: 2025-08-18
Payer: MEDICARE

## 2025-08-18 ENCOUNTER — HOSPITAL ENCOUNTER (OUTPATIENT)
Dept: RADIOLOGY | Facility: MEDICAL CENTER | Age: 80
End: 2025-08-18
Attending: FAMILY MEDICINE
Payer: MEDICARE

## 2025-08-18 VITALS
WEIGHT: 180 LBS | HEART RATE: 83 BPM | OXYGEN SATURATION: 94 % | SYSTOLIC BLOOD PRESSURE: 132 MMHG | BODY MASS INDEX: 25.77 KG/M2 | TEMPERATURE: 98 F | DIASTOLIC BLOOD PRESSURE: 80 MMHG | HEIGHT: 70 IN

## 2025-08-18 DIAGNOSIS — H61.22 IMPACTED CERUMEN OF LEFT EAR: ICD-10-CM

## 2025-08-18 DIAGNOSIS — R05.1 ACUTE COUGH: Primary | ICD-10-CM

## 2025-08-18 DIAGNOSIS — R10.31 GROIN PAIN, RIGHT: ICD-10-CM

## 2025-08-18 DIAGNOSIS — R05.1 ACUTE COUGH: ICD-10-CM

## 2025-08-18 PROCEDURE — 99213 OFFICE O/P EST LOW 20 MIN: CPT | Mod: 25 | Performed by: FAMILY MEDICINE

## 2025-08-18 PROCEDURE — 3079F DIAST BP 80-89 MM HG: CPT | Performed by: FAMILY MEDICINE

## 2025-08-18 PROCEDURE — 3075F SYST BP GE 130 - 139MM HG: CPT | Performed by: FAMILY MEDICINE

## 2025-08-18 PROCEDURE — 69210 REMOVE IMPACTED EAR WAX UNI: CPT | Performed by: FAMILY MEDICINE

## 2025-08-18 PROCEDURE — 71046 X-RAY EXAM CHEST 2 VIEWS: CPT

## 2025-08-18 RX ORDER — ALBUTEROL SULFATE 90 UG/1
2 INHALANT RESPIRATORY (INHALATION) EVERY 4 HOURS PRN
Qty: 1 EACH | Refills: 1 | Status: SHIPPED | OUTPATIENT
Start: 2025-08-18

## 2025-08-18 RX ORDER — PREDNISONE 10 MG/1
TABLET ORAL
Qty: 15 TABLET | Refills: 0 | Status: SHIPPED | OUTPATIENT
Start: 2025-08-18 | End: 2025-08-25

## 2025-08-18 ASSESSMENT — FIBROSIS 4 INDEX: FIB4 SCORE: 1.65

## 2025-08-19 ENCOUNTER — RESULTS FOLLOW-UP (OUTPATIENT)
Dept: MEDICAL GROUP | Facility: MEDICAL CENTER | Age: 80
End: 2025-08-19
Payer: MEDICARE

## 2025-08-19 DIAGNOSIS — J18.9 COMMUNITY ACQUIRED PNEUMONIA, UNSPECIFIED LATERALITY: Primary | ICD-10-CM

## 2025-08-19 RX ORDER — CEFPODOXIME PROXETIL 200 MG/1
200 TABLET, FILM COATED ORAL 2 TIMES DAILY
Qty: 10 TABLET | Refills: 0 | Status: SHIPPED | OUTPATIENT
Start: 2025-08-19

## (undated) DEVICE — KIT CUSTOM PROCEDURE SINGLE FOR ENDO  (15/CA)

## (undated) DEVICE — SET EXTENSION WITH 2 PORTS (48EA/CA) ***PART #2C8610 IS A SUBSTITUTE*****

## (undated) DEVICE — GOWN SURGEONS LARGE - (32/CA)

## (undated) DEVICE — SYRINGE SAFETY 5 ML 18 GA X 1-1/2 BLUNT LL (100/BX 4BX/CA)

## (undated) DEVICE — CONTAINER, SPECIMEN, STERILE

## (undated) DEVICE — FILM CASSETTE ENDO

## (undated) DEVICE — GOWN WARMING STANDARD FLEX - (30/CA)

## (undated) DEVICE — GLOVE, LITE (PAIR)

## (undated) DEVICE — FORCEP RADIAL JAW 4 STANDARD CAPACITY W/NEEDLE 240CM (40EA/BX)

## (undated) DEVICE — TUBE NG SALEM SUMP 14FR (50EA/BX)

## (undated) DEVICE — SPONGE GAUZE NON-STERILE 4X4 - (2000/CA 10PK/CA)

## (undated) DEVICE — ELECTRODE 850 FOAM ADHESIVE - HYDROGEL RADIOTRNSPRNT (50/PK)

## (undated) DEVICE — TUBE CONNECTING SUCTION - CLEAR PLASTIC STERILE 72 IN (50EA/CA)

## (undated) DEVICE — LACTATED RINGERS INJ 1000 ML - (14EA/CA 60CA/PF)

## (undated) DEVICE — TUBING CLEARLINK DUO-VENT - C-FLO (48EA/CA)

## (undated) DEVICE — SYRINGE SAFETY 10 ML 18 GA X 1 1/2 BLUNT LL (100/BX 4BX/CA)

## (undated) DEVICE — ELECTRODE DUAL RETURN W/ CORD - (50/PK)

## (undated) DEVICE — CHLORAPREP 26 ML APPLICATOR - ORANGE TINT(25/CA)

## (undated) DEVICE — NEPTUNE 4 PORT MANIFOLD - (20/PK)

## (undated) DEVICE — SYRINGE DISP. 60 CC LL - (30/BX, 12BX/CA)**WHEN THESE ARE GONE ORDER #500206**

## (undated) DEVICE — CANISTER SUCTION RIGID RED 1500CC (40EA/CA)

## (undated) DEVICE — CANISTER SUCTION 3000ML MECHANICAL FILTER AUTO SHUTOFF MEDI-VAC NONSTERILE LF DISP  (40EA/CA)

## (undated) DEVICE — PAD PREP 24 X 48 CUFFED - (100/CA)

## (undated) DEVICE — SENSOR OXIMETER ADULT SPO2 RD SET (20EA/BX)

## (undated) DEVICE — CAPTIVATOR II-10MM ROUND STIFF  (40/BX)

## (undated) DEVICE — KIT  I.V. START (100EA/CA)

## (undated) DEVICE — CATHETER IV 20 GA X 1-1/4 ---SURG.& SDS ONLY--- (50EA/BX)

## (undated) DEVICE — SENSOR SPO2 ADULT LNCS ADTX (20/BX) ORDER ITEM #19593

## (undated) DEVICE — GOWN SURGEONS X-LARGE - DISP. (30/CA)

## (undated) DEVICE — BASIN EMESIS DISP. - (250/CA)

## (undated) DEVICE — CANNULA O2 COMFORT SOFT EAR ADULT 7 FT TUBING (50/CA)

## (undated) DEVICE — CATHETER IV SAFETY 20 GA X 1-1/4 (50/BX)

## (undated) DEVICE — SYRINGE SAFETY 3 ML 18 GA X 1 1/2 BLUNT LL (100/BX 8BX/CA)

## (undated) DEVICE — WATER IRRIGATION STERILE 1000ML (12EA/CA)

## (undated) DEVICE — TRAP POLYP E-TRAP (25EA/BX)

## (undated) DEVICE — MANIFOLD NEPTUNE 1 PORT (20/PK)